# Patient Record
Sex: MALE | Race: WHITE | URBAN - METROPOLITAN AREA
[De-identification: names, ages, dates, MRNs, and addresses within clinical notes are randomized per-mention and may not be internally consistent; named-entity substitution may affect disease eponyms.]

---

## 2017-03-31 RX ORDER — BISOPROLOL FUMARATE AND HYDROCHLOROTHIAZIDE 5; 6.25 MG/1; MG/1
TABLET ORAL
Qty: 30 TAB | Refills: 0 | Status: SHIPPED | OUTPATIENT
Start: 2017-03-31 | End: 2017-05-09 | Stop reason: SDUPTHER

## 2017-03-31 RX ORDER — AMLODIPINE AND BENAZEPRIL HYDROCHLORIDE 5; 10 MG/1; MG/1
CAPSULE ORAL
Qty: 30 CAP | Refills: 0 | Status: SHIPPED | OUTPATIENT
Start: 2017-03-31 | End: 2017-05-09 | Stop reason: SDUPTHER

## 2017-04-11 ENCOUNTER — TELEPHONE (OUTPATIENT)
Dept: FAMILY MEDICINE CLINIC | Age: 62
End: 2017-04-11

## 2017-04-11 NOTE — TELEPHONE ENCOUNTER
Contact # is [phone]    Patient is requesting a call from [facility], did not want to state what he was calling in regards to

## 2017-05-09 ENCOUNTER — OFFICE VISIT (OUTPATIENT)
Dept: FAMILY MEDICINE CLINIC | Age: 62
End: 2017-05-09

## 2017-05-09 VITALS
RESPIRATION RATE: 18 BRPM | OXYGEN SATURATION: 98 % | SYSTOLIC BLOOD PRESSURE: 124 MMHG | HEART RATE: 77 BPM | DIASTOLIC BLOOD PRESSURE: 72 MMHG | TEMPERATURE: 98.9 F | WEIGHT: 173.6 LBS | HEIGHT: 70 IN | BODY MASS INDEX: 24.85 KG/M2

## 2017-05-09 DIAGNOSIS — E63.9 DIETARY DEFICIENCY: ICD-10-CM

## 2017-05-09 DIAGNOSIS — R20.2 NUMBNESS AND TINGLING OF BOTH FEET: ICD-10-CM

## 2017-05-09 DIAGNOSIS — I10 ESSENTIAL HYPERTENSION, BENIGN: Primary | ICD-10-CM

## 2017-05-09 DIAGNOSIS — E78.5 HYPERLIPIDEMIA LDL GOAL <130: ICD-10-CM

## 2017-05-09 DIAGNOSIS — R20.0 NUMBNESS AND TINGLING OF BOTH FEET: ICD-10-CM

## 2017-05-09 RX ORDER — BISOPROLOL FUMARATE AND HYDROCHLOROTHIAZIDE 5; 6.25 MG/1; MG/1
TABLET ORAL
Qty: 90 TAB | Refills: 1 | Status: SHIPPED | OUTPATIENT
Start: 2017-05-09 | End: 2017-10-20 | Stop reason: SDUPTHER

## 2017-05-09 RX ORDER — AMLODIPINE AND BENAZEPRIL HYDROCHLORIDE 5; 10 MG/1; MG/1
CAPSULE ORAL
Qty: 90 CAP | Refills: 1 | Status: SHIPPED | OUTPATIENT
Start: 2017-05-09 | End: 2017-10-20 | Stop reason: SDUPTHER

## 2017-05-09 NOTE — MR AVS SNAPSHOT
Visit Information Date & Time Provider Department Dept. Phone Encounter #  
 5/9/2017  2:00 PM Sapna Eastman  [address] [place] [phone] [id number] Upcoming Health Maintenance Date Due COLONOSCOPY 9/16/1972 ZOSTER VACCINE AGE 60> 9/16/2014 INFLUENZA AGE 9 TO ADULT 8/1/2017 DTaP/Tdap/Td series (2 - Td) 5/12/2025 Allergies as of 5/9/2017  Review Complete On: 5/9/2017 By: Eliana Jarrett LPN No Known Allergies Current Immunizations  Never Reviewed Name Date Tdap 5/12/2015 Not reviewed this visit You Were Diagnosed With   
  
 Codes Comments Essential hypertension, benign    -  Primary ICD-10-CM: I10 
ICD-9-CM: 401.1 Hyperlipidemia LDL goal <130     ICD-10-CM: E78.5 ICD-9-CM: 272.4 Numbness and tingling of both feet     ICD-10-CM: R20.0, R20.2 ICD-9-CM: 782.0 Dietary deficiency     ICD-10-CM: E63.9 ICD-9-CM: 269.9 Vitals BP Pulse Temp Resp Height(growth percentile) Weight(growth percentile) 124/72 (BP 1 Location: Left arm, BP Patient Position: Sitting) 77 98.9 °F (37.2 °C) (Oral) 18 5' 10\" (1.778 m) 173 lb 9.6 oz (78.7 kg) SpO2 BMI Smoking Status 98% 24.91 kg/m2 Never Smoker BMI and BSA Data Body Mass Index Body Surface Area 24.91 kg/m 2 1.97 m 2 Preferred Pharmacy Pharmacy Name Phone [facility]/PHARMACY #[id number]ellon [facility] [address] [phone] Your Updated Medication List  
  
   
This list is accurate as of: 5/9/17  2:50 PM.  Always use your most recent med list.  
  
  
  
  
 ALPRAZolam 0.25 mg tablet Commonly known as:  Vijay Inch Take 0.5-1 Tabs by mouth two (2) times daily as needed for Anxiety. Max Daily Amount: 0.5 mg.  
  
 amLODIPine-benazepril 5-10 mg per capsule Commonly known as:  LOTREL  
TAKE 1 CAPSULE BY MOUTH DAILY. bisoprolol-hydroCHLOROthiazide 5-6.25 mg per tablet Commonly known as:  [facility] TAKE 1 TABLET BY MOUTH DAILY. NexIUM 20 mg capsule Generic drug:  esomeprazole Take  by mouth daily. zolpidem 10 mg tablet Commonly known as:  AMBIEN Take 0.5-1 Tabs by mouth nightly as needed for Sleep. Max Daily Amount: 10 mg.  
  
  
  
  
Prescriptions Sent to Pharmacy Refills  
 amLODIPine-benazepril (LOTREL) 5-10 mg per capsule 1 Sig: TAKE 1 CAPSULE BY MOUTH DAILY. Class: Normal  
 Pharmacy: [facility]/pharmacy #[id number] [place], [address]i Castellon [facility] [address] Ph #: [phone]  
 bisoprolol-hydroCHLOROthiazide (ZIAC) 5-6.25 mg per tablet 1 Sig: TAKE 1 TABLET BY MOUTH DAILY. Class: Normal  
 Pharmacy: [facility]/pharmacy #[id number]- [place], [address] [facility] Ph #: [phone] We Performed the Following LIPID PANEL [03864 CPT(R)] METABOLIC PANEL, COMPREHENSIVE [97941 CPT(R)] VITAMIN B12 [id number] CPT(R)] VITAMIN D, 25 HYDROXY [id number] CPT(R)] Patient Instructions High Blood Pressure: Care Instructions Your Care Instructions If your blood pressure is usually above 140/90, you have high blood pressure, or hypertension. That means the top number is 140 or higher or the bottom number is 90 or higher, or both. Despite what a lot of people think, high blood pressure usually doesn't cause headaches or make you feel dizzy or lightheaded. It usually has no symptoms. But it does increase your risk for heart attack, stroke, and kidney or eye damage. The higher your blood pressure, the more your risk increases. Your doctor will give you a goal for your blood pressure. Your goal will be based on your health and your age. An example of a goal is to keep your blood pressure below 140/90. Lifestyle changes, such as eating healthy and being active, are always important to help lower blood pressure. You might also take medicine to reach your blood pressure goal. 
Follow-up care is a key part of your treatment and safety.  Be sure to make and go to all appointments, and call your doctor if you are having problems. It's also a good idea to know your test results and keep a list of the medicines you take. How can you care for yourself at home? Medical treatment · If you stop taking your medicine, your blood pressure will go back up. You may take one or more types of medicine to lower your blood pressure. Be safe with medicines. Take your medicine exactly as prescribed. Call your doctor if you think you are having a problem with your medicine. · Talk to your doctor before you start taking aspirin every day. Aspirin can help certain people lower their risk of a heart attack or stroke. But taking aspirin isn't right for everyone, because it can cause serious bleeding. · See your doctor regularly. You may need to see the doctor more often at first or until your blood pressure comes down. · If you are taking blood pressure medicine, talk to your doctor before you take decongestants or anti-inflammatory medicine, such as ibuprofen. Some of these medicines can raise blood pressure. · Learn how to check your blood pressure at home. Lifestyle changes · Stay at a healthy weight. This is especially important if you put on weight around the waist. Losing even 10 pounds can help you lower your blood pressure. · If your doctor recommends it, get more exercise. Walking is a good choice. Bit by bit, increase the amount you walk every day. Try for at least 30 minutes on most days of the week. You also may want to swim, bike, or do other activities. · Avoid or limit alcohol. Talk to your doctor about whether you can drink any alcohol. · Try to limit how much sodium you eat to less than 2,300 milligrams (mg) a day. Your doctor may ask you to try to eat less than 1,500 mg a day. · Eat plenty of fruits (such as bananas and oranges), vegetables, legumes, whole grains, and low-fat dairy products. · Lower the amount of saturated fat in your diet. Saturated fat is found in animal products such as milk, cheese, and meat. Limiting these foods may help you lose weight and also lower your risk for heart disease. · Do not smoke. Smoking increases your risk for heart attack and stroke. If you need help quitting, talk to your doctor about stop-smoking programs and medicines. These can increase your chances of quitting for good. When should you call for help? Call 911 anytime you think you may need emergency care. This may mean having symptoms that suggest that your blood pressure is causing a serious heart or blood vessel problem. Your blood pressure may be over 180/110. For example, call 911 if: 
· You have symptoms of a heart attack. These may include: ¨ Chest pain or pressure, or a strange feeling in the chest. 
¨ Sweating. ¨ Shortness of breath. ¨ Nausea or vomiting. ¨ Pain, pressure, or a strange feeling in the back, neck, jaw, or upper belly or in one or both shoulders or arms. ¨ Lightheadedness or sudden weakness. ¨ A fast or irregular heartbeat. · You have symptoms of a stroke. These may include: 
¨ Sudden numbness, tingling, weakness, or loss of movement in your face, arm, or leg, especially on only one side of your body. ¨ Sudden vision changes. ¨ Sudden trouble speaking. ¨ Sudden confusion or trouble understanding simple statements. ¨ Sudden problems with walking or balance. ¨ A sudden, severe headache that is different from past headaches. · You have severe back or belly pain. Do not wait until your blood pressure comes down on its own. Get help right away. Call your doctor now or seek immediate care if: 
· Your blood pressure is much higher than normal (such as 180/110 or higher), but you don't have symptoms. · You think high blood pressure is causing symptoms, such as: ¨ Severe headache. ¨ Blurry vision.  
Watch closely for changes in your health, and be sure to contact your doctor if: 
· Your blood pressure measures 140/90 or higher at least 2 times. That means the top number is 140 or higher or the bottom number is 90 or higher, or both. · You think you may be having side effects from your blood pressure medicine. · Your blood pressure is usually normal, but it goes above normal at least 2 times. Where can you learn more? Go to http://jorge alberto-katie.info/. Enter C901 in the search box to learn more about \"High Blood Pressure: Care Instructions. \" Current as of: August 8, 2016 Content Version: 11.2 © 6582-8750 [employer]. Care instructions adapted under license by [employer] (which disclaims liability or warranty for this information). If you have questions about a medical condition or this instruction, always ask your healthcare professional. [address] any warranty or liability for your use of this information. Introducing [facility]! Dear Jailene Walker: Thank you for requesting a [employer] account. Our records indicate that you already have an active [employer] account. You can access your account anytime at [email or web address] Did you know that you can access your hospital and ER discharge instructions at any time in [employer]? You can also review all of your test results from your hospital stay or ER visit. Additional Information If you have questions, please visit the Frequently Asked Questions section of the [employer] website at [email or web address] Remember, [employer] is NOT to be used for urgent needs. For medical emergencies, dial 911. Now available from your iPhone and Android! Please provide this summary of care documentation to your next provider. Your primary care clinician is listed as [place] [address], [facility]/s . If you have any questions after today's visit, please call [phone].

## 2017-05-09 NOTE — PROGRESS NOTES
HISTORY OF PRESENT ILLNESS  HPI  Anita Villarreal is a 57 y.o. Male with history of HTN and hyperlipidemia who presents to office today for follow-up. Pt complains of corns on the base of both of his second toes. Pt notes an intermittent burning, tingling feeling in both feet. He states that this feeling was worse 3-4 months ago. Pt states that his last colonoscopy was done in the 1980's with Dr. Leanne Navarro; the colonoscopy revealed polyps. Pt notes a family history of colon cancer with his maternal grandmother, who developed it in her 46s; he notes that his mother also had polyps but not colon cancer. Pt notes that he had hemorrhoid surgery in the early 2000's. Past Medical History:   Diagnosis Date    Essential hypertension, benign 3/15/2015    Skin cancer     2 BCC's    Sun-damaged skin      Past Surgical History:   Procedure Laterality Date    HX COLONOSCOPY  ? age 47    overdue-family h/o    HX HERNIA REPAIR      HX TONSILLECTOMY       No current outpatient prescriptions on file prior to visit. No current facility-administered medications on file prior to visit. No Known Allergies  Family History   Problem Relation Age of Onset    Heart Disease Mother     MS Father     Heart Disease Father     No Known Problems Sister      Social History     Social History    Marital status: UNKNOWN     Spouse name: N/A    Number of children: N/A    Years of education: N/A     Social History Main Topics    Smoking status: Never Smoker    Smokeless tobacco: None    Alcohol use 7.0 oz/week     14 Standard drinks or equivalent per week      Comment: occasionally    Drug use: None    Sexual activity: Not Asked     Other Topics Concern    None     Social History Narrative               Review of Systems   Constitutional: Negative for chills, diaphoresis, fever, malaise/fatigue and weight loss.    HENT: Negative for congestion, ear discharge, ear pain, hearing loss, nosebleeds, sore throat and tinnitus. Eyes: Negative for blurred vision, double vision, photophobia, pain, discharge and redness. Respiratory: Negative for cough, hemoptysis, sputum production, shortness of breath, wheezing and stridor. Cardiovascular: Negative for chest pain, palpitations, orthopnea, claudication, leg swelling and PND. Gastrointestinal: Negative for abdominal pain, blood in stool, constipation, diarrhea, heartburn, melena, nausea and vomiting. Genitourinary: Negative for dysuria, flank pain, frequency, hematuria and urgency. Musculoskeletal: Negative for back pain, falls, joint pain, myalgias and neck pain. Skin: Negative for itching and rash. Calluses on left great toe & both second toes   Neurological: Positive for tingling (bilateral feet). Negative for dizziness, tremors, sensory change, speech change, focal weakness, seizures, loss of consciousness, weakness and headaches. Endo/Heme/Allergies: Negative for environmental allergies and polydipsia. Does not bruise/bleed easily. Psychiatric/Behavioral: Negative for depression, hallucinations, memory loss, substance abuse and suicidal ideas. The patient is not nervous/anxious and does not have insomnia. Results for orders placed or performed in visit on 05/12/15   CBC W/O DIFF   Result Value Ref Range    WBC 7.8 3.4 - 10.8 x10E3/uL    RBC 5.20 4. 14 - 5.80 x10E6/uL    HGB 16.1 12.6 - 17.7 g/dL    HCT 45.7 37.5 - 51.0 %    MCV 88 79 - 97 fL    MCH 31.0 26.6 - 33.0 pg    MCHC 35.2 31.5 - 35.7 g/dL    RDW 12.6 12.3 - 15.4 %    PLATELET 799 305 - 961 x10E3/uL   LIPID PANEL   Result Value Ref Range    Cholesterol, total 276 (H) 100 - 199 mg/dL    Triglyceride 203 (H) 0 - 149 mg/dL    HDL Cholesterol 49 >39 mg/dL    VLDL, calculated 41 (H) 5 - 40 mg/dL    LDL, calculated 186 (H) 0 - 99 mg/dL   PROSTATE SPECIFIC AG (PSA)   Result Value Ref Range    Prostate Specific Ag 5.1 (H) 0.0 - 4.0 ng/mL   METABOLIC PANEL, BASIC   Result Value Ref Range    Glucose 101 (H) 65 - 99 mg/dL    BUN 19 8 - 27 mg/dL    Creatinine 1.17 0.76 - 1.27 mg/dL    GFR est non-AA 67 >59 mL/min/1.73    GFR est AA 78 >59 mL/min/1.73    BUN/Creatinine ratio 16 10 - 22    Sodium 143 134 - 144 mmol/L    Potassium 4.2 3.5 - 5.2 mmol/L    Chloride 101 97 - 108 mmol/L    CO2 24 18 - 29 mmol/L    Calcium 9.7 8.6 - 10.2 mg/dL   URINALYSIS W/ RFLX MICROSCOPIC   Result Value Ref Range    Specific Gravity CANCELED     pH (UA) CANCELED     Protein CANCELED     Glucose CANCELED     Ketone CANCELED    CVD REPORT   Result Value Ref Range    INTERPRETATION Note                  Physical Exam  Visit Vitals    /72 (BP 1 Location: Left arm, BP Patient Position: Sitting)    Pulse 77    Temp 98.9 °F (37.2 °C) (Oral)    Resp 18    Ht 5' 10\" (1.778 m)    Wt 173 lb 9.6 oz (78.7 kg)    SpO2 98%    BMI 24.91 kg/m2        Visit Vitals    /72 (BP 1 Location: Left arm, BP Patient Position: Sitting)    Pulse 77    Temp 98.9 °F (37.2 °C) (Oral)    Resp 18    Ht 5' 10\" (1.778 m)    Wt 173 lb 9.6 oz (78.7 kg)    SpO2 98%    BMI 24.91 kg/m2     General:  Alert, cooperative, no distress, appears stated age. Head:  Normocephalic, without obvious abnormality, atraumatic. Eyes:  Conjunctivae/corneas clear. PERRL, EOMs intact. Fundi benign   Ears:  Normal TMs and external ear canals both ears. Nose: Nares normal. Septum midline. Mucosa normal. No drainage or sinus tenderness. Throat: Lips, mucosa, and tongue normal. Teeth and gums normal.   Neck: Supple, symmetrical, trachea midline, no adenopathy, thyroid: no enlargement/tenderness/nodules, no carotid bruit and no JVD. Back:   Symmetric, no curvature. ROM normal. No CVA tenderness. Lungs:   Clear to auscultation bilaterally. Chest wall:  No tenderness or deformity. Heart:  Regular rate and rhythm, S1, S2 normal, no murmur, click, rub or gallop. Abdomen:   Soft, non-tender. Bowel sounds normal. No masses,  No organomegaly.    Genitalia: Normal male without lesion, discharge or tenderness. Rectal:  Normal tone, normal prostate, no masses or tenderness  Guaiac negative stool. Extremities: Extremities normal, atraumatic, no cyanosis or edema. he has a callus over the medial aspect of his left great toe, he also has a small callus at the base of both second metatarsals distally, little more on left than right, and it's a little bit tender in that area   Pulses: 2+ and symmetric all extremities. Skin: Skin color, texture, turgor normal. No rashes or lesions   Lymph nodes: Cervical, supraclavicular, and axillary nodes normal.   Neurologic: CNII-XII intact. Normal strength, sensation and reflexes throughout. ASSESSMENT and PLAN    ICD-10-CM ICD-9-CM    1. Essential hypertension, benign I10 401.1 LIPID PANEL      METABOLIC PANEL, COMPREHENSIVE      amLODIPine-benazepril (LOTREL) 5-10 mg per capsule      bisoprolol-hydroCHLOROthiazide (ZIAC) 5-6.25 mg per tablet   2. Hyperlipidemia LDL goal <130 E78.5 272.4    3. Numbness and tingling of both feet R20.0 782.0 VITAMIN B12    R20.2     4. Dietary deficiency E63.9 269.9 VITAMIN D, 25 HYDROXY     Rebbecca Cjter was seen today for hypertension and foot pain. Diagnoses and all orders for this visit:    Essential hypertension, benign  -     LIPID PANEL  -     METABOLIC PANEL, COMPREHENSIVE  -     amLODIPine-benazepril (LOTREL) 5-10 mg per capsule; TAKE 1 CAPSULE BY MOUTH DAILY. -     bisoprolol-hydroCHLOROthiazide (ZIAC) 5-6.25 mg per tablet; TAKE 1 TABLET BY MOUTH DAILY. Hyperlipidemia LDL goal <130    Numbness and tingling of both feet  -     VITAMIN B12    Dietary deficiency  -     VITAMIN D, 25 HYDROXY      Follow-up Disposition:  Return if foot pain, tingling symptoms worsen or fail to improve, for F/U HTN and CHOL.      lab results and schedule of future lab studies reviewed with patient  reviewed diet, exercise and weight control  cardiovascular risk and specific lipid/LDL goals reviewed  reviewed medications and side effects in detail  Please call my office if there are any questions- [phone]. I will arrange for follow up after the lab tests done from today return  Recommended a weekly \"heart check. \" I went into detail how to do this. Call for refills on medications as needed. Discussed expected course/resolution/complications of diagnosis in detail with patient. Medication risks/benefits/costs/interactions/alternatives discussed with patient. Pt was given an after visit summary which includes diagnoses, current medications & vitals. Pt expressed understanding with the diagnosis and plan. Total 25 minutes,60 % counseling re: Reviewed in detail the proper technique of checking the blood pressure- check it on an average day only, not on a stressful day, sitting, no exercise for at least 1 hour and not experiencing any new pain( chronic pain is OK). Patient encouraged to check BP sitting and standing at least once a month and to report these readings to me if > 140/ 90 on average , or if the standing BP is >  15 points lower than the sitting. Reviewed symptoms, or lack thereof, of hypertension and elevated cholesterol. Because of the tingling in his feet, we checked his B12 level. We'll check his vitamin D level as well, as he believes it was low in the past and he does not consume milk products on a regular basis. iscussion re: causes of tingling, what it can be and what it is not( not likely to be DM, e.g. Due to normal glucose in the recent past, etc.    As far his colonoscopy, he should have that done every 5 years, and I gave him the name of Dr. Kavin Dasilva. He'll let me know if he can't get in over the next couple months. A heme test stool x3 was given; if that's negative, he can wait for the colonoscopy.   Also, discussed symptoms of concern that were noted today in the note above, treatment options( including doing nothing), when to follow up before recommended time frame. Also, answered all questions. This document was written by Jhon Valles, as dictated by Louis Lai MD.  I have reviewed and agree with the above note and have made corrections where appropriate Max Quintana M.D.

## 2017-05-09 NOTE — LETTER
7/5/2017 9:20 AM 
 
Mr. Karen Moran [facility] [address] [phone] Dear Karen Ricardo: 
 
Please find your most recent results below. Resulted Orders LIPID PANEL Result Value Ref Range Cholesterol, total 255 (H) 100 - 199 mg/dL Triglyceride 138 0 - 149 mg/dL HDL Cholesterol 59 >39 mg/dL VLDL, calculated 28 5 - 40 mg/dL LDL, calculated 168 (H) 0 - 99 mg/dL Narrative Performed at:  [address]  [id number] : Bryanna Aguirre MD, Phone:  [phone] METABOLIC PANEL, COMPREHENSIVE Result Value Ref Range Glucose 99 65 - 99 mg/dL BUN 16 8 - 27 mg/dL Creatinine 1.08 0.76 - 1.27 mg/dL GFR est non-AA 73 >59 mL/min/1.73 GFR est AA 85 >59 mL/min/1.73  
 BUN/Creatinine ratio 15 10 - 24 Sodium 142 134 - 144 mmol/L Potassium 4.2 3.5 - 5.2 mmol/L Chloride 98 96 - 106 mmol/L  
 CO2 24 18 - 29 mmol/L Calcium 9.5 8.6 - 10.2 mg/dL Protein, total 6.8 6.0 - 8.5 g/dL Albumin 4.5 3.6 - 4.8 g/dL GLOBULIN, TOTAL 2.3 1.5 - 4.5 g/dL A-G Ratio 2.0 1.2 - 2.2 Bilirubin, total 0.6 0.0 - 1.2 mg/dL Alk. phosphatase 102 39 - 117 IU/L  
 AST (SGOT) 44 (H) 0 - 40 IU/L  
 ALT (SGPT) 41 0 - 44 IU/L Narrative Performed at:  [address]  [id number] : Bryanna Aguirre MD, Phone:  [phone] VITAMIN D, 25 HYDROXY Result Value Ref Range VITAMIN D, 25-HYDROXY 12.7 (L) 30.0 - 100.0 ng/mL Comment:  
   Vitamin D deficiency has been defined by the [facility] practice guideline as a 
level of serum 25-OH vitamin D less than 20 ng/mL (1,2). The Endocrine Society went on to further define vitamin D 
insufficiency as a level between 21 and 29 ng/mL (2). 1. IOM ([place] of Medicine). 2010. Dietary reference 
   intakes for calcium and D. [facility]: The 
   auctionPAL. 2. Pipe MF, Priyanka SNYDER, Vicky QUINTEROS, et al. 
   Evaluation, treatment, and prevention of vitamin D 
   deficiency: an Endocrine Society clinical practice 
   guideline. JCEM. 2011 Jul; 96(7):1911-30. Narrative Performed at:  [address]  [id number] : April Avery MD, Phone:  [phone] VITAMIN B12 Result Value Ref Range Vitamin B12 334 211 - 946 pg/mL Narrative Performed at:  [address]  [id number] : April Avery MD, Phone:  [phone] CVD REPORT Result Value Ref Range INTERPRETATION Note Comment:  
   Supplement report is available. Narrative Performed at:  [address] [place] [address] [address] [place] [address]  [id number] : Kavon Baker PhD, Phone:  [phone] RECOMMENDATIONS: 
Your LDL cholesterol continues to be elevated. The American Heart Association Heart Risk Calculation( a new way to calculate your risk of a stroke or heart attack in the future) shows your risk to be 12.2 % chance of a heart attack or stroke in the next 10 years( mainly due to your age); a statin ( a cholesterol lowering medication )  is recommended if that # is > 7.5 %. Also, Your Vit D was low- You need to take lifelong Vitamin D( available OTC)- 5000 units daily preferably with a meal to help absorption. Everything else is OK.  No diabetes, normal liver and kidney tests.    
 
 Please make an appointment to discuss starting a medication to reduce your cardiovascular risk Please call me if you have any questions: [phone] Sincerely, 
 
Harjeet Restrepo MD

## 2017-05-09 NOTE — PATIENT INSTRUCTIONS

## 2017-05-09 NOTE — PROGRESS NOTES
Chief Complaint   Patient presents with    Hypertension     Patient is having some bilateral foot irritation. Patient has tingling, and burning sensation in hands and feet. 1. Have you been to the ER, urgent care clinic since your last visit? Hospitalized since your last visit? No    2. Have you seen or consulted any other health care providers outside of the [address] [place] [address] since your last visit? Include any pap smears or colon screening.  No

## 2017-05-10 LAB
25(OH)D3+25(OH)D2 SERPL-MCNC: 12.7 NG/ML (ref 30–100)
ALBUMIN SERPL-MCNC: 4.5 G/DL (ref 3.6–4.8)
ALBUMIN/GLOB SERPL: 2 {RATIO} (ref 1.2–2.2)
ALP SERPL-CCNC: 102 IU/L (ref 39–117)
ALT SERPL-CCNC: 41 IU/L (ref 0–44)
AST SERPL-CCNC: 44 IU/L (ref 0–40)
BILIRUB SERPL-MCNC: 0.6 MG/DL (ref 0–1.2)
BUN SERPL-MCNC: 16 MG/DL (ref 8–27)
BUN/CREAT SERPL: 15 (ref 10–24)
CALCIUM SERPL-MCNC: 9.5 MG/DL (ref 8.6–10.2)
CHLORIDE SERPL-SCNC: 98 MMOL/L (ref 96–106)
CHOLEST SERPL-MCNC: 255 MG/DL (ref 100–199)
CO2 SERPL-SCNC: 24 MMOL/L (ref 18–29)
CREAT SERPL-MCNC: 1.08 MG/DL (ref 0.76–1.27)
GLOBULIN SER CALC-MCNC: 2.3 G/DL (ref 1.5–4.5)
GLUCOSE SERPL-MCNC: 99 MG/DL (ref 65–99)
HDLC SERPL-MCNC: 59 MG/DL
INTERPRETATION, 910389: NORMAL
LDLC SERPL CALC-MCNC: 168 MG/DL (ref 0–99)
POTASSIUM SERPL-SCNC: 4.2 MMOL/L (ref 3.5–5.2)
PROT SERPL-MCNC: 6.8 G/DL (ref 6–8.5)
SODIUM SERPL-SCNC: 142 MMOL/L (ref 134–144)
TRIGL SERPL-MCNC: 138 MG/DL (ref 0–149)
VIT B12 SERPL-MCNC: 334 PG/ML (ref 211–946)
VLDLC SERPL CALC-MCNC: 28 MG/DL (ref 5–40)

## 2017-07-04 NOTE — PROGRESS NOTES
Your LDL cholesterol continues to be elevated. The American Heart Association Heart Risk Calculation( a new way to calculate your risk of a stroke or heart attack in the future) shows your risk to be 12.2 % chance of a heart attack or stroke in the next 10 years( mainly due to your age); a statin ( a cholesterol lowering medication )  is recommended if that # is > 7.5 %. Also, Your Vit D was low- You need to take lifelong Vitamin D( available OTC)- 5000 units daily preferably with a meal to help absorption. Everything else is OK. No diabetes, normal liver and kidney tests.        Please make an appointment to discuss starting a medication to reduce your cardiovascular risk

## 2018-01-22 DIAGNOSIS — I10 ESSENTIAL HYPERTENSION, BENIGN: ICD-10-CM

## 2018-01-22 RX ORDER — AMLODIPINE AND BENAZEPRIL HYDROCHLORIDE 5; 10 MG/1; MG/1
CAPSULE ORAL
Qty: 90 CAP | Refills: 0 | Status: SHIPPED | OUTPATIENT
Start: 2018-01-22 | End: 2018-04-12 | Stop reason: SDUPTHER

## 2018-01-22 RX ORDER — BISOPROLOL FUMARATE AND HYDROCHLOROTHIAZIDE 5; 6.25 MG/1; MG/1
TABLET ORAL
Qty: 90 TAB | Refills: 0 | Status: SHIPPED | OUTPATIENT
Start: 2018-01-22 | End: 2018-04-12 | Stop reason: SDUPTHER

## 2018-02-07 ENCOUNTER — OFFICE VISIT (OUTPATIENT)
Dept: FAMILY MEDICINE CLINIC | Age: 63
End: 2018-02-07

## 2018-02-07 VITALS
HEIGHT: 70 IN | HEART RATE: 72 BPM | DIASTOLIC BLOOD PRESSURE: 68 MMHG | OXYGEN SATURATION: 98 % | WEIGHT: 179.8 LBS | SYSTOLIC BLOOD PRESSURE: 128 MMHG | TEMPERATURE: 98.5 F | RESPIRATION RATE: 16 BRPM | BODY MASS INDEX: 25.74 KG/M2

## 2018-02-07 DIAGNOSIS — L57.8 ACTINIC SKIN DAMAGE: ICD-10-CM

## 2018-02-07 DIAGNOSIS — Z12.11 SCREENING FOR COLON CANCER: ICD-10-CM

## 2018-02-07 DIAGNOSIS — I10 ESSENTIAL HYPERTENSION, BENIGN: ICD-10-CM

## 2018-02-07 DIAGNOSIS — E55.9 VITAMIN D DEFICIENCY: Chronic | ICD-10-CM

## 2018-02-07 DIAGNOSIS — Z00.00 ROUTINE GENERAL MEDICAL EXAMINATION AT A HEALTH CARE FACILITY: Primary | ICD-10-CM

## 2018-02-07 DIAGNOSIS — E78.5 HYPERLIPIDEMIA LDL GOAL <130: ICD-10-CM

## 2018-02-07 NOTE — PROGRESS NOTES
HISTORY OF PRESENT ILLNESS  HPI  Tyrese Rodgers is a 60 y.o. male with history of HTN and hyperlipidemia who presents to office today for a complete fasting physical. He denies unusual SOB, chest pain, and any recent ER visits or hospitalizations. Pt states that his last colonoscopy was 10+ years ago, which found benign polyps. He notes a family history of colon cancer with his grandmother. He denies visible blood in stool. Pt has seen dermatologist Dr. Kirti Torres for removal of several pre-cancerous lesions. His last visit was around one year ago, and he was told to follow up in one year. Pt complains of intermittent bilateral ankle swelling, equal in both legs. He states the swelling goes down overnight. Past Medical History:   Diagnosis Date    Essential hypertension, benign 3/15/2015    Skin cancer     2 BCC's    Sun-damaged skin     Vitamin D deficiency 2/11/2018     Past Surgical History:   Procedure Laterality Date    HX COLONOSCOPY  ? age 47    overdue-family h/o    HX HERNIA REPAIR      HX TONSILLECTOMY       Current Outpatient Prescriptions on File Prior to Visit   Medication Sig Dispense Refill    bisoprolol-hydroCHLOROthiazide (ZIAC) 5-6.25 mg per tablet TAKE 1 TABLET BY MOUTH DAILY. 90 Tab 0    amLODIPine-benazepril (LOTREL) 5-10 mg per capsule TAKE 1 CAPSULE BY MOUTH DAILY. 90 Cap 0     No current facility-administered medications on file prior to visit.       No Known Allergies  Family History   Problem Relation Age of Onset    Heart Disease Mother     MS Father     Heart Disease Father     No Known Problems Sister      Social History     Social History    Marital status: UNKNOWN     Spouse name: N/A    Number of children: N/A    Years of education: N/A     Social History Main Topics    Smoking status: Never Smoker    Smokeless tobacco: Never Used    Alcohol use 7.0 oz/week     14 Standard drinks or equivalent per week      Comment: occasionally    Drug use: None    Sexual activity: Not Asked     Other Topics Concern    None     Social History Narrative             Review of Systems   Constitutional: Negative for chills, diaphoresis, fever, malaise/fatigue and weight loss. HENT: Negative for congestion, ear discharge, ear pain, hearing loss, nosebleeds, sore throat and tinnitus. Eyes: Negative for blurred vision, double vision, photophobia, pain, discharge and redness. Respiratory: Negative for cough, hemoptysis, sputum production, shortness of breath, wheezing and stridor. Cardiovascular: Negative for chest pain, palpitations, orthopnea, claudication, leg swelling and PND. Gastrointestinal: Negative for abdominal pain, blood in stool, constipation, diarrhea, heartburn, melena, nausea and vomiting. Genitourinary: Negative for dysuria, flank pain, frequency, hematuria and urgency. Musculoskeletal: Negative for back pain, falls, joint pain, myalgias and neck pain. Skin: Negative for itching and rash. Neurological: Negative for dizziness, tingling, tremors, sensory change, speech change, focal weakness, seizures, loss of consciousness, weakness and headaches. Endo/Heme/Allergies: Negative for environmental allergies and polydipsia. Does not bruise/bleed easily. Psychiatric/Behavioral: Negative for depression, hallucinations, memory loss, substance abuse and suicidal ideas. The patient is not nervous/anxious and does not have insomnia.       Results for orders placed or performed in visit on 89/97/32   METABOLIC PANEL, COMPREHENSIVE   Result Value Ref Range    Glucose 123 (H) 65 - 99 mg/dL    BUN 15 8 - 27 mg/dL    Creatinine 1.14 0.76 - 1.27 mg/dL    GFR est non-AA 68 >59 mL/min/1.73    GFR est AA 79 >59 mL/min/1.73    BUN/Creatinine ratio 13 10 - 24    Sodium 140 134 - 144 mmol/L    Potassium 4.6 3.5 - 5.2 mmol/L    Chloride 97 96 - 106 mmol/L    CO2 26 18 - 29 mmol/L    Calcium 9.5 8.6 - 10.2 mg/dL    Protein, total 7.0 6.0 - 8.5 g/dL    Albumin 4.6 3.6 - 4.8 g/dL    GLOBULIN, TOTAL 2.4 1.5 - 4.5 g/dL    A-G Ratio 1.9 1.2 - 2.2    Bilirubin, total 0.9 0.0 - 1.2 mg/dL    Alk. phosphatase 105 39 - 117 IU/L    AST (SGOT) 53 (H) 0 - 40 IU/L    ALT (SGPT) 48 (H) 0 - 44 IU/L   LIPID PANEL   Result Value Ref Range    Cholesterol, total 272 (H) 100 - 199 mg/dL    Triglyceride 229 (H) 0 - 149 mg/dL    HDL Cholesterol 59 >39 mg/dL    VLDL, calculated 46 (H) 5 - 40 mg/dL    LDL, calculated 167 (H) 0 - 99 mg/dL   CBC W/O DIFF   Result Value Ref Range    WBC 8.8 3.4 - 10.8 x10E3/uL    RBC 5.09 4.14 - 5.80 x10E6/uL    HGB 15.6 13.0 - 17.7 g/dL    HCT 45.1 37.5 - 51.0 %    MCV 89 79 - 97 fL    MCH 30.6 26.6 - 33.0 pg    MCHC 34.6 31.5 - 35.7 g/dL    RDW 12.8 12.3 - 15.4 %    PLATELET 937 374 - 393 x10E3/uL   PSA W/ REFLX FREE PSA   Result Value Ref Range    Prostate Specific Ag 5.7 (H) 0.0 - 4.0 ng/mL    Reflex Criteria Comment    PSA, FREE   Result Value Ref Range    PSA, Free 1.08 N/A ng/mL    % Free PSA 18.9 %   CVD REPORT   Result Value Ref Range    INTERPRETATION Note                Physical Exam  Visit Vitals    /68 (BP 1 Location: Left arm, BP Patient Position: Sitting)    Pulse 72    Temp 98.5 °F (36.9 °C) (Oral)    Resp 16    Ht 5' 10\" (1.778 m)    Wt 179 lb 12.8 oz (81.6 kg)    SpO2 98%    BMI 25.8 kg/m2     General:  Alert, cooperative, no distress, appears stated age. Head:  Normocephalic, without obvious abnormality, atraumatic. Eyes:  Conjunctivae/corneas clear. PERRL, EOMs intact. Fundi benign   Ears:  Normal TMs and external ear canals both ears. Nose: Nares normal. Septum midline. Mucosa normal. No drainage or sinus tenderness. Throat: Lips, mucosa, and tongue normal. Teeth and gums normal.   Neck: Supple, symmetrical, trachea midline, no adenopathy, thyroid: no enlargement/tenderness/nodules, no carotid bruit and no JVD. Back:   Symmetric, no curvature. ROM normal. No CVA tenderness. Lungs:   Clear to auscultation bilaterally.    Chest wall: No tenderness or deformity. Heart:  Regular rate and rhythm, S1, S2 normal, no murmur, click, rub or gallop. Abdomen:   Soft, non-tender. Bowel sounds normal. No masses,  No organomegaly. Genitalia:  Normal male without lesion, discharge or tenderness. Rectal:  Normal tone, prostate 1+ symmetrically enlarged, non-tender   Extremities: Extremities normal, atraumatic, no cyanosis or edema. Pulses: 2+ and symmetric all extremities. Skin: Skin color, texture, turgor normal. He has a lot of actinic skin changes on his face and shoulders, no signs of any active skin cancer, in general the skin has multiple benign angioma and benign moles, also has scattered seborrheic keratoses   Lymph nodes: Cervical, supraclavicular, and axillary nodes normal.   Neurologic: CNII-XII intact. Normal strength, sensation and reflexes throughout. ASSESSMENT and PLAN    ICD-10-CM ICD-9-CM    1. Routine general medical examination at a health care facility Z00.00 V70.0 AMB POC EKG ROUTINE W/ 12 LEADS, INTER & REP      METABOLIC PANEL, COMPREHENSIVE      LIPID PANEL      CBC W/O DIFF      PSA W/ REFLX FREE PSA      REFERRAL TO GASTROENTEROLOGY      PSA, FREE   2. Hyperlipidemia LDL goal <130 E78.5 272.4    3. Essential hypertension, benign I10 401.1    4. Screening for colon cancer Z12.11 V76.51 REFERRAL TO GASTROENTEROLOGY   5. Vitamin D deficiency E55.9 268.9    6. Actinic skin damage L57.8 692.79     basal cell right cheek area- sees derm regularly. Diagnoses and all orders for this visit:    1. Routine general medical examination at a health care facility  -     AMB POC EKG ROUTINE W/ 12 LEADS, INTER & REP  -     METABOLIC PANEL, COMPREHENSIVE  -     LIPID PANEL  -     CBC W/O DIFF  -     PSA W/ REFLX FREE PSA  -     [facility]  -     PSA, FREE    2. Hyperlipidemia LDL goal <130    3. Essential hypertension, benign    4. Screening for colon cancer  -     [facility]    5.  Vitamin D deficiency    6. Actinic skin damage  Comments:  basal cell right cheek area- sees derm regularly. Other orders  -     CVD REPORT      Follow-up Disposition:  Return in about 6 months (around 8/7/2018), or BP OOc, for F/U HTN and CHOL, f/u Vitamin D deficiency. lab results and schedule of future lab studies reviewed with patient  reviewed diet, exercise and weight control  cardiovascular risk and specific lipid/LDL goals reviewed  reviewed medications and side effects in detail  Please call my office if there are any questions- [phone]. I will arrange for follow up after the lab tests done from today return  Recommended a weekly \"heart check. \" I went into detail how to do this. Call for refills on medications as needed. Discussed expected course/resolution/complications of diagnosis in detail with patient. Medication risks/benefits/costs/interactions/alternatives discussed with patient. Pt was given an after visit summary which includes diagnoses, current medications & vitals. Pt expressed understanding with the diagnosis and plan. For his swelling that he has intermittently in his ankles, I recommended following a low salt diet and using support hose. Reviewed in detail the proper technique of checking the blood pressure- check it on an average day only, not on a stressful day, sitting, no exercise for at least 1 hour and not experiencing any new pain( chronic pain is OK). Patient encouraged to check BP sitting and standing at least once a month and to report these readings to me if > 140/ 90 on average , or if the standing BP is >  15 points lower than the sitting. Reviewed symptoms, or lack thereof, of hypertension and elevated cholesterol. His Vit D was low last year, but he is not on Vit D; most likely because it was< 15 , he needs to be on 5000 units of Vit D unless we tell him otherwise.    We sent him a letter last year about the Vit D and the elevated cholesterol and elevated cardiac risk numbers but he never responded or followed up on that- he really needs to start on a statin as well; will address this when we get back to him about his labs from today. For his colonoscopy I gave him the information for several GI specialists, and he should call and get that scheduled. I gave him heme test stool x3, and if that's positive it will help him get into the colonoscopy earlier. Apparently he has a cataract on his right eye of significance, and they're probably going to do surgery in the next year or so. It mainly causes nighttime vision problems when he's driving a car. This document was written by Mirian Davila, as dictated by Ryder Panchal MD.  I have reviewed and agree with the above note and have made corrections where appropriate Max Palacios M.D.

## 2018-02-07 NOTE — MR AVS SNAPSHOT
[address] 
[phone] Patient: Fátima Dominguez MRN: [id number] NRM:[birth year] Visit Information Date & Time Provider Department Dept. Phone Encounter #  
 2/7/2018  9:00 AM Gabrielle Agosto  [facility] [phone] [id number] Upcoming Health Maintenance Date Due COLONOSCOPY 9/16/1972 ZOSTER VACCINE AGE 60> 7/16/2014 DTaP/Tdap/Td series (2 - Td) 5/12/2025 Allergies as of 2/7/2018  Review Complete On: 2/7/2018 By: Brooklynn Love LPN No Known Allergies Current Immunizations  Never Reviewed Name Date Tdap 5/12/2015 Not reviewed this visit You Were Diagnosed With   
  
 Codes Comments Routine general medical examination at a health care facility    -  Primary ICD-10-CM: Z00.00 ICD-9-CM: V70.0 Hyperlipidemia LDL goal <130     ICD-10-CM: E78.5 ICD-9-CM: 272.4 Essential hypertension, benign     ICD-10-CM: I10 
ICD-9-CM: 401.1 Vitals BP Pulse Temp Resp Height(growth percentile) Weight(growth percentile) 128/68 (BP 1 Location: Left arm, BP Patient Position: Sitting) 72 98.5 °F (36.9 °C) (Oral) 16 5' 10\" (1.778 m) 179 lb 12.8 oz (81.6 kg) SpO2 BMI Smoking Status 98% 25.8 kg/m2 Never Smoker Vitals History BMI and BSA Data Body Mass Index Body Surface Area  
 25.8 kg/m 2 2.01 m 2 Preferred Pharmacy Pharmacy Name Phone [id number] [phone] Your Updated Medication List  
  
   
This list is accurate as of: 2/7/18 10:47 AM.  Always use your most recent med list. amLODIPine-benazepril 5-10 mg per capsule Commonly known as:  LOTREL  
TAKE 1 CAPSULE BY MOUTH DAILY. bisoprolol-hydroCHLOROthiazide 5-6.25 mg per tablet Commonly known as:  [facility] TAKE 1 TABLET BY MOUTH DAILY. We Performed the Following AMB POC EKG ROUTINE W/ 12 LEADS, INTER & REP [39639 CPT(R)] CBC W/O DIFF [63583 CPT(R)] LIPID PANEL [88933 CPT(R)] METABOLIC PANEL, COMPREHENSIVE [62033 CPT(R)] PSA W/ REFLX FREE PSA [64894 CPT(R)] Introducing [facility]! Dear Lucia Mendoza: Thank you for requesting a [employer] account. Our records indicate that you already have an active [employer] account. You can access your account anytime at [email or web address] Did you know that you can access your hospital and ER discharge instructions at any time in [employer]? You can also review all of your test results from your hospital stay or ER visit. Additional Information If you have questions, please visit the Frequently Asked Questions section of the [employer] website at [email or web address] Remember, [employer] is NOT to be used for urgent needs. For medical emergencies, dial 911. Now available from your iPhone and Android! Please provide this summary of care documentation to your next provider. Your primary care clinician is listed as [address] [address], [facility]/s . If you have any questions after today's visit, please call [phone].

## 2018-02-07 NOTE — PROGRESS NOTES
Pt presents to office today for complete physical and Fasting labs. Pt states that he has been seeing Dr. Stefania Landeros for skin issues. Last seen about 6 months ago. pt also reports that he has been having some neck and shoulder pain lately and will to discuss that with you. Chief Complaint   Patient presents with    Complete Physical     Yearly physical.    Labs     fasting     1. Have you been to the ER, urgent care clinic since your last visit? Hospitalized since your last visit? No    2. Have you seen or consulted any other health care providers outside of the [address] [place] [address] since your last visit? Include any pap smears or colon screening.  No

## 2018-02-08 LAB
ALBUMIN SERPL-MCNC: 4.6 G/DL (ref 3.6–4.8)
ALBUMIN/GLOB SERPL: 1.9 {RATIO} (ref 1.2–2.2)
ALP SERPL-CCNC: 105 IU/L (ref 39–117)
ALT SERPL-CCNC: 48 IU/L (ref 0–44)
AST SERPL-CCNC: 53 IU/L (ref 0–40)
BILIRUB SERPL-MCNC: 0.9 MG/DL (ref 0–1.2)
BUN SERPL-MCNC: 15 MG/DL (ref 8–27)
BUN/CREAT SERPL: 13 (ref 10–24)
CALCIUM SERPL-MCNC: 9.5 MG/DL (ref 8.6–10.2)
CHLORIDE SERPL-SCNC: 97 MMOL/L (ref 96–106)
CHOLEST SERPL-MCNC: 272 MG/DL (ref 100–199)
CO2 SERPL-SCNC: 26 MMOL/L (ref 18–29)
CREAT SERPL-MCNC: 1.14 MG/DL (ref 0.76–1.27)
ERYTHROCYTE [DISTWIDTH] IN BLOOD BY AUTOMATED COUNT: 12.8 % (ref 12.3–15.4)
GFR SERPLBLD CREATININE-BSD FMLA CKD-EPI: 68 ML/MIN/1.73
GFR SERPLBLD CREATININE-BSD FMLA CKD-EPI: 79 ML/MIN/1.73
GLOBULIN SER CALC-MCNC: 2.4 G/DL (ref 1.5–4.5)
GLUCOSE SERPL-MCNC: 123 MG/DL (ref 65–99)
HCT VFR BLD AUTO: 45.1 % (ref 37.5–51)
HDLC SERPL-MCNC: 59 MG/DL
HGB BLD-MCNC: 15.6 G/DL (ref 13–17.7)
INTERPRETATION, 910389: NORMAL
LDLC SERPL CALC-MCNC: 167 MG/DL (ref 0–99)
MCH RBC QN AUTO: 30.6 PG (ref 26.6–33)
MCHC RBC AUTO-ENTMCNC: 34.6 G/DL (ref 31.5–35.7)
MCV RBC AUTO: 89 FL (ref 79–97)
PLATELET # BLD AUTO: 220 X10E3/UL (ref 150–379)
POTASSIUM SERPL-SCNC: 4.6 MMOL/L (ref 3.5–5.2)
PROT SERPL-MCNC: 7 G/DL (ref 6–8.5)
PSA FREE MFR SERPL: 18.9 %
PSA FREE SERPL-MCNC: 1.08 NG/ML
PSA SERPL-MCNC: 5.7 NG/ML (ref 0–4)
RBC # BLD AUTO: 5.09 X10E6/UL (ref 4.14–5.8)
REFLEX CRITERIA: ABNORMAL
SODIUM SERPL-SCNC: 140 MMOL/L (ref 134–144)
TRIGL SERPL-MCNC: 229 MG/DL (ref 0–149)
VLDLC SERPL CALC-MCNC: 46 MG/DL (ref 5–40)
WBC # BLD AUTO: 8.8 X10E3/UL (ref 3.4–10.8)

## 2018-02-11 PROBLEM — E55.9 VITAMIN D DEFICIENCY: Chronic | Status: ACTIVE | Noted: 2018-02-11

## 2018-04-12 DIAGNOSIS — I10 ESSENTIAL HYPERTENSION, BENIGN: ICD-10-CM

## 2018-04-13 RX ORDER — BISOPROLOL FUMARATE AND HYDROCHLOROTHIAZIDE 5; 6.25 MG/1; MG/1
TABLET ORAL
Qty: 90 TAB | Refills: 1 | Status: SHIPPED | OUTPATIENT
Start: 2018-04-13 | End: 2018-10-16 | Stop reason: SDUPTHER

## 2018-04-13 RX ORDER — AMLODIPINE AND BENAZEPRIL HYDROCHLORIDE 5; 10 MG/1; MG/1
CAPSULE ORAL
Qty: 90 CAP | Refills: 1 | Status: SHIPPED | OUTPATIENT
Start: 2018-04-13 | End: 2018-04-17 | Stop reason: SDUPTHER

## 2018-04-17 DIAGNOSIS — I10 ESSENTIAL HYPERTENSION, BENIGN: ICD-10-CM

## 2018-04-19 RX ORDER — AMLODIPINE AND BENAZEPRIL HYDROCHLORIDE 5; 10 MG/1; MG/1
CAPSULE ORAL
Qty: 90 CAP | Refills: 1 | Status: SHIPPED | OUTPATIENT
Start: 2018-04-19 | End: 2018-10-16 | Stop reason: SDUPTHER

## 2018-08-28 ENCOUNTER — OFFICE VISIT (OUTPATIENT)
Dept: FAMILY MEDICINE CLINIC | Age: 63
End: 2018-08-28

## 2018-08-28 VITALS
HEART RATE: 76 BPM | OXYGEN SATURATION: 97 % | SYSTOLIC BLOOD PRESSURE: 130 MMHG | WEIGHT: 177 LBS | RESPIRATION RATE: 18 BRPM | DIASTOLIC BLOOD PRESSURE: 76 MMHG | BODY MASS INDEX: 25.34 KG/M2 | HEIGHT: 70 IN | TEMPERATURE: 98.1 F

## 2018-08-28 DIAGNOSIS — M21.621 BUNIONETTE OF RIGHT FOOT: ICD-10-CM

## 2018-08-28 DIAGNOSIS — M79.671 PAIN IN BOTH FEET: ICD-10-CM

## 2018-08-28 DIAGNOSIS — I10 ESSENTIAL HYPERTENSION, BENIGN: ICD-10-CM

## 2018-08-28 DIAGNOSIS — E55.9 VITAMIN D DEFICIENCY: Chronic | ICD-10-CM

## 2018-08-28 DIAGNOSIS — E78.5 HYPERLIPIDEMIA LDL GOAL <130: ICD-10-CM

## 2018-08-28 DIAGNOSIS — M79.672 PAIN IN BOTH FEET: ICD-10-CM

## 2018-08-28 DIAGNOSIS — R97.20 ELEVATED PSA MEASUREMENT: ICD-10-CM

## 2018-08-28 DIAGNOSIS — L03.032 CELLULITIS OF GREAT TOE OF LEFT FOOT: Primary | ICD-10-CM

## 2018-08-28 DIAGNOSIS — Z85.828 HISTORY OF BASAL CELL CARCINOMA: ICD-10-CM

## 2018-08-28 RX ORDER — IBUPROFEN 200 MG
600-800 TABLET ORAL
COMMUNITY
End: 2018-09-29 | Stop reason: ALTCHOICE

## 2018-08-28 RX ORDER — CEPHALEXIN 500 MG/1
500 CAPSULE ORAL 4 TIMES DAILY
Qty: 40 CAP | Refills: 0 | Status: SHIPPED | OUTPATIENT
Start: 2018-08-28 | End: 2018-09-07

## 2018-08-28 NOTE — PROGRESS NOTES
Chief Complaint Patient presents with  Foot Pain LEFT foot great toe redness and pain. RIGHT foot has dried hard area on outside of foot \"REVIEWED RECORD IN PREPARATION FOR VISIT AND HAVE OBTAINED THE NECESSARY DOCUMENTATION\" 1. Have you been to the ER, urgent care clinic since your last visit? Hospitalized since your last visit? No 
 
2. Have you seen or consulted any other health care providers outside of the [address] [place] [address] since your last visit? Include any pap smears or colon screening.  No

## 2018-08-28 NOTE — MR AVS SNAPSHOT
Marisol Pollock 
 
 
 [address] [facility] [address] [place] [address] 
[phone] Patient: Niru Grimes MRN: [id number] UFU:[birth year] Visit Information Date & Time Provider Department Dept. Phone Encounter #  
 8/28/2018  3:00 PM Jean Claude Galicia  [facility] [phone] [id number] Upcoming Health Maintenance Date Due COLONOSCOPY 9/16/1972 ZOSTER VACCINE AGE 60> 7/16/2014 Influenza Age 5 to Adult 3/31/2019* DTaP/Tdap/Td series (2 - Td) 5/12/2025 *Topic was postponed. The date shown is not the original due date. Allergies as of 8/28/2018  Review Complete On: 8/28/2018 By: Leroy Gomez LPN No Known Allergies Current Immunizations  Never Reviewed Name Date Tdap 5/12/2015 Not reviewed this visit Vitals BP Pulse Temp Resp Height(growth percentile) Weight(growth percentile) 130/76 (BP 1 Location: Left arm, BP Patient Position: Sitting) 76 98.1 °F (36.7 °C) (Oral) 18 5' 10\" (1.778 m) 177 lb (80.3 kg) SpO2 BMI Smoking Status 97% 25.4 kg/m2 Never Smoker BMI and BSA Data Body Mass Index Body Surface Area  
 25.4 kg/m 2 1.99 m 2 Preferred Pharmacy [facility] Name Phone [id number] [address] [phone] Your Updated Medication List  
  
   
This list is accurate as of 8/28/18  3:59 PM.  Always use your most recent med list. amLODIPine-benazepril 5-10 mg per capsule Commonly known as:  LOTREL  
TAKE 1 CAPSULE BY MOUTH DAILY. bisoprolol-hydroCHLOROthiazide 5-6.25 mg per tablet Commonly known as:  [facility] TAKE 1 TABLET BY MOUTH DAILY. cephALEXin 500 mg capsule Commonly known as:  [facility] Take 1 Cap by mouth four (4) times daily for 10 days. Prescriptions Sent to Pharmacy  Refills  
 cephALEXin (KEFLEX) 500 mg capsule 0  
 Sig: Take 1 Cap by mouth four (4) times daily for 10 days. Class: Normal  
 [id number] [place], [address] [facility] #: [phone] Route: Oral  
  
Introducing [facility]! Dear Krishna Standing: Thank you for requesting a [employer] account. Our records indicate that you already have an active [employer] account. You can access your account anytime at [email or web address] Did you know that you can access your hospital and ER discharge instructions at any time in [employer]? You can also review all of your test results from your hospital stay or ER visit. Additional Information If you have questions, please visit the Frequently Asked Questions section of the [employer] website at [email or web address] Remember, [employer] is NOT to be used for urgent needs. For medical emergencies, dial 911. Now available from your iPhone and Android! Please provide this summary of care documentation to your next provider. Your primary care clinician is listed as [address] [address], [facility] . If you have any questions after today's visit, please call [phone].

## 2018-08-28 NOTE — PROGRESS NOTES
HISTORY OF PRESENT ILLNESS 
HPI Yaritza Arroyo is a 60 y.o. Male with a history of HTM, itamin D deficiency and hyperlipidemia with LDL goal <130, who presents at the office today for bilateral foot discomfort. Pt noticed 2-3 weeks ago that his left great toe displays some swelling and an erythematous callous on the medial tside of the toe, and notes that the area is painful. Pt indicates a hardened area around the lateral aspect of his right foot, and notes that it tender. Pt denies fever and chills. No hx of gout or anything like the foot/toe pain he is having today Past Medical History:  
Diagnosis Date  Essential hypertension, benign 3/15/2015  
 Skin cancer 2 BCC's  Sun-damaged skin  Vitamin D deficiency 2/11/2018 Past Surgical History:  
Procedure Laterality Date  HX COLONOSCOPY  ? age 47  
 overdue-family h/o  
 HX HERNIA REPAIR    
 HX TONSILLECTOMY Current Outpatient Prescriptions on File Prior to Visit Medication Sig Dispense Refill  amLODIPine-benazepril (LOTREL) 5-10 mg per capsule TAKE 1 CAPSULE BY MOUTH DAILY. 90 Cap 1  
 bisoprolol-hydroCHLOROthiazide (ZIAC) 5-6.25 mg per tablet TAKE 1 TABLET BY MOUTH DAILY. 90 Tab 1 No current facility-administered medications on file prior to visit. No Known Allergies Family History Problem Relation Age of Onset  Heart Disease Mother  MS Father  Heart Disease Father  No Known Problems Sister Social History Social History  Marital status: SINGLE Spouse name: N/A  
 Number of children: N/A  
 Years of education: N/A Social History Main Topics  Smoking status: Never Smoker  Smokeless tobacco: Never Used  Alcohol use 7.0 oz/week 14 Standard drinks or equivalent per week Comment: occasionally  Drug use: None  Sexual activity: Not Asked Other Topics Concern  None Social History Narrative Review of Systems Constitutional: Negative for chills, diaphoresis, fever, malaise/fatigue and weight loss. Eyes: Negative for blurred vision, double vision, pain and redness. Respiratory: Negative for cough, shortness of breath and wheezing. Cardiovascular: Negative for chest pain, palpitations, orthopnea, claudication, leg swelling and PND. Musculoskeletal:  
     Left great toe pain with callous formation Right lateral aspect pain Skin: Negative for itching and rash. Neurological: Negative for dizziness, tingling, tremors, sensory change, speech change, focal weakness, seizures, loss of consciousness, weakness and headaches. Results for orders placed or performed in visit on 02/07/18 METABOLIC PANEL, COMPREHENSIVE Result Value Ref Range Glucose 123 (H) 65 - 99 mg/dL BUN 15 8 - 27 mg/dL Creatinine 1.14 0.76 - 1.27 mg/dL GFR est non-AA 68 >59 mL/min/1.73 GFR est AA 79 >59 mL/min/1.73  
 BUN/Creatinine ratio 13 10 - 24 Sodium 140 134 - 144 mmol/L Potassium 4.6 3.5 - 5.2 mmol/L Chloride 97 96 - 106 mmol/L  
 CO2 26 18 - 29 mmol/L Calcium 9.5 8.6 - 10.2 mg/dL Protein, total 7.0 6.0 - 8.5 g/dL Albumin 4.6 3.6 - 4.8 g/dL GLOBULIN, TOTAL 2.4 1.5 - 4.5 g/dL A-G Ratio 1.9 1.2 - 2.2 Bilirubin, total 0.9 0.0 - 1.2 mg/dL Alk. phosphatase 105 39 - 117 IU/L  
 AST (SGOT) 53 (H) 0 - 40 IU/L  
 ALT (SGPT) 48 (H) 0 - 44 IU/L  
LIPID PANEL Result Value Ref Range Cholesterol, total 272 (H) 100 - 199 mg/dL Triglyceride 229 (H) 0 - 149 mg/dL HDL Cholesterol 59 >39 mg/dL VLDL, calculated 46 (H) 5 - 40 mg/dL LDL, calculated 167 (H) 0 - 99 mg/dL CBC W/O DIFF Result Value Ref Range WBC 8.8 3.4 - 10.8 x10E3/uL  
 RBC 5.09 4.14 - 5.80 x10E6/uL HGB 15.6 13.0 - 17.7 g/dL HCT 45.1 37.5 - 51.0 % MCV 89 79 - 97 fL  
 MCH 30.6 26.6 - 33.0 pg  
 MCHC 34.6 31.5 - 35.7 g/dL  
 RDW 12.8 12.3 - 15.4 % PLATELET 368 706 - 566 x10E3/uL PSA W/ REFLX FREE PSA Result Value Ref Range Prostate Specific Ag 5.7 (H) 0.0 - 4.0 ng/mL Reflex Criteria Comment PSA, FREE Result Value Ref Range PSA, Free 1.08 N/A ng/mL  
 % Free PSA 18.9 % CVD REPORT Result Value Ref Range INTERPRETATION Note Physical Exam 
Visit Vitals  /76 (BP 1 Location: Left arm, BP Patient Position: Sitting)  Pulse 76  Temp 98.1 °F (36.7 °C) (Oral)  Resp 18  Ht 5' 10\" (1.778 m)  Wt 177 lb (80.3 kg)  SpO2 97%  BMI 25.4 kg/m2 Head: Normocephalic, without obvious abnormality, atraumatic Eyes: conjunctivae/corneas clear. PERRL, EOM's intact. Neck: supple, symmetrical, trachea midline, no adenopathy, thyroid: not enlarged, symmetric, no tenderness/mass/nodules, no carotid bruit and no JVD Lungs: clear to auscultation bilaterally Heart: regular rate and rhythm, S1, S2 normal, no murmur, click, rub or gallop Extremities: extremities normal, atraumatic, no cyanosis or edema. Left foot has swelling of the great toe over the medial aspect at the IP joint. He is also tender at the base of the nail medially. The tenderness does not extend proximally more than the IP area. There is no pain over the IP joint laterally. The skin over the swollen area medially is calloused, but is softer than the average callous and there are at least two 0.5 cm in diameter of white discoloration. Right foot has some tenderness over the 5th MTP joint laterally and there is a small callous over this area. This is directly over where the pain and there is no erythema or warmth. Pulses: 2+ and symmetric Lymph nodes: Cervical, supraclavicular, and axillary nodes normal. 
Neurologic: Grossly normal 
 
 
ASSESSMENT and PLAN 
  ICD-10-CM ICD-9-CM 1. Cellulitis of great toe of left foot L03.032 681.10 cephALEXin (KEFLEX) 500 mg capsule 2. Bunionette of right foot M21.621 727.1 3. Hyperlipidemia LDL goal <130 E78.5 272.4 4. Elevated PSA measurement R97.20 790.93 PSA W/ REFLX FREE PSA 5. Essential hypertension, benign I10 401.1 6. Vitamin D deficiency E55.9 268.9   
7. History of basal cell carcinomaInactive Z85.828 V10.83   
 right zygomatic area Feb 2013- Dr. Randi Mejia Diagnoses and all orders for this visit: 1. Cellulitis of great toe of left foot 
-     cephALEXin (KEFLEX) 500 mg capsule; Take 1 Cap by mouth four (4) times daily for 10 days. 2. Bunionette of right foot 3. Hyperlipidemia LDL goal <130 
 
4. Elevated PSA measurement -     PSA W/ REFLX FREE PSA 5. Essential hypertension, benign 6. Vitamin D deficiency 7. History of basal cell carcinoma Comments: 
right zygomatic area Feb 2013- Dr. Randi Mejia Follow-up Disposition: 
Return in about 3 months (around 11/28/2018), or if symptoms worsen or fail to improve, for F/u elevated PSA,, F/U HTN and CHOL. reviewed medications and side effects in detail Please call my office if there are any questions- [phone]. Discussed expected course/resolution/complications of diagnosis in detail with patient. Medication risks/benefits/costs/interactions/alternatives discussed with patient. Pt was given an after visit summary which includes diagnoses, current medications & vitals. Pt expressed understanding with the diagnosis and plan. Total 25 minutes,60 % counseling re: Patient to call if no better in 3 -4 days and prn new problems. Reviewed in detail the proper technique of checking the blood pressure- check it on an average day only, not on a stressful day, sitting, no exercise for at least 1 hour and not experiencing any new pain( chronic pain is OK). Patient encouraged to check BP sitting and standing at least once a month and to report these readings to me if > 130/ 80 on average , or if the standing BP is >  15 points lower than the sitting. Reviewed symptoms, or lack thereof, of hypertension and elevated cholesterol. Recommended a weekly \"heart check. \" I went into detail how to do this. Regular exercise is very important to your health; it helps mentally, physically, socially; it prevents injuries if done properly. Exercise, even as simple as walking 20-30 minutes daily has major benefits to your health even though your \"numbers\" are the same in the lab. See if you can add this into your daily regimen and after a few months it will become a regular habit-\"just something you do,\" like brushing your teeth. A combination of aerobic exercise and strengthening and stretching is felt to be the best for you, so this should be your ultimate goal.  
This can be done in the privacy of your home or in a group setting as at the gym  Some prefer having a , others prefer to do exercise in groups or individually. Do what \"works\" for you. You need to make it simple and \"fun,\" or you most likely you will not continue it. Reviewed his labs from February. His LDL's are elevated. He has been on a statin before, so recommended that we check his LDL level again and consider starting a statin. I was considering gout as a possibility, but factoring in the location and history of pain onset it is likely not gout. Also, discussed symptoms of concern that were noted today in the note above, treatment options( including doing nothing), when to follow up before recommended time frame. Also, answered all questions. This document was written by Darren Amezcua, as dictated by Frances Dickey MD. 
I have reviewed and agree with the above note and have made corrections where appropriate Max Meade M.D.

## 2018-09-03 ENCOUNTER — TELEPHONE (OUTPATIENT)
Dept: FAMILY MEDICINE CLINIC | Age: 63
End: 2018-09-03

## 2018-09-03 NOTE — TELEPHONE ENCOUNTER
Left great toe better, but not back to .normal; encouraged to finish antibiotic  And soak toe inn warm water until no pain for a week and also push the medial skin around the nail( where ingrown) medially annika regular basis daily after soaking and after that do same thing after he talisha for the next 2-3 months to prevent ingrowing again. Referred to Dr. Annah Harada for several other foot concerns- mainly calluses and aches and pains from those.

## 2018-09-11 ENCOUNTER — HOSPITAL ENCOUNTER (OUTPATIENT)
Dept: LAB | Age: 63
Discharge: HOME | End: 2018-09-11

## 2018-09-18 ENCOUNTER — TELEPHONE (OUTPATIENT)
Dept: FAMILY MEDICINE CLINIC | Age: 63
End: 2018-09-18

## 2018-09-18 DIAGNOSIS — M10.072 ACUTE IDIOPATHIC GOUT INVOLVING TOE OF LEFT FOOT: Primary | ICD-10-CM

## 2018-09-18 NOTE — TELEPHONE ENCOUNTER
Antibiotics are not given for exposure- cleaning like he did with soap- hibiclens I believe he said , should be adequate; if he feels it is getting infected, needs appt with me or podiatrist; I added Uric acid to his pending lab work- he needs to get his lab work done

## 2018-09-18 NOTE — TELEPHONE ENCOUNTER
Patient scheduled for follow up visit for gout. He states he is concerned that he might need an antibiotic for his foot since he stepped in water last night. States he spoke with Dr Sola Weinstein last pm so he is aware of what is going on

## 2018-09-27 LAB
ALBUMIN SERPL-MCNC: 4.2 G/DL (ref 3.6–4.8)
ALBUMIN/GLOB SERPL: 1.9 {RATIO} (ref 1.2–2.2)
ALP SERPL-CCNC: 105 IU/L (ref 39–117)
ALT SERPL-CCNC: 33 IU/L (ref 0–44)
AST SERPL-CCNC: 27 IU/L (ref 0–40)
BILIRUB SERPL-MCNC: 0.4 MG/DL (ref 0–1.2)
BUN SERPL-MCNC: 19 MG/DL (ref 8–27)
BUN/CREAT SERPL: 18 (ref 10–24)
CALCIUM SERPL-MCNC: 9.4 MG/DL (ref 8.6–10.2)
CHLORIDE SERPL-SCNC: 99 MMOL/L (ref 96–106)
CHOLEST SERPL-MCNC: 208 MG/DL (ref 100–199)
CO2 SERPL-SCNC: 25 MMOL/L (ref 20–29)
CREAT SERPL-MCNC: 1.03 MG/DL (ref 0.76–1.27)
ERYTHROCYTE [DISTWIDTH] IN BLOOD BY AUTOMATED COUNT: 12.7 % (ref 12.3–15.4)
GLOBULIN SER CALC-MCNC: 2.2 G/DL (ref 1.5–4.5)
GLUCOSE SERPL-MCNC: 115 MG/DL (ref 65–99)
HCT VFR BLD AUTO: 43 % (ref 37.5–51)
HDLC SERPL-MCNC: 43 MG/DL
HGB BLD-MCNC: 14.7 G/DL (ref 13–17.7)
INTERPRETATION, 910389: NORMAL
LDLC SERPL CALC-MCNC: 131 MG/DL (ref 0–99)
MCH RBC QN AUTO: 29.7 PG (ref 26.6–33)
MCHC RBC AUTO-ENTMCNC: 34.2 G/DL (ref 31.5–35.7)
MCV RBC AUTO: 87 FL (ref 79–97)
PLATELET # BLD AUTO: 253 X10E3/UL (ref 150–379)
POTASSIUM SERPL-SCNC: 4.3 MMOL/L (ref 3.5–5.2)
PROT SERPL-MCNC: 6.4 G/DL (ref 6–8.5)
PSA FREE MFR SERPL: 16.3 %
PSA FREE SERPL-MCNC: 0.88 NG/ML
PSA SERPL-MCNC: 5.4 NG/ML (ref 0–4)
RBC # BLD AUTO: 4.95 X10E6/UL (ref 4.14–5.8)
REFLEX CRITERIA: ABNORMAL
SODIUM SERPL-SCNC: 138 MMOL/L (ref 134–144)
TRIGL SERPL-MCNC: 168 MG/DL (ref 0–149)
URATE SERPL-MCNC: 6.9 MG/DL (ref 3.7–8.6)
VLDLC SERPL CALC-MCNC: 34 MG/DL (ref 5–40)
WBC # BLD AUTO: 7.2 X10E3/UL (ref 3.4–10.8)

## 2018-09-28 ENCOUNTER — OFFICE VISIT (OUTPATIENT)
Dept: FAMILY MEDICINE CLINIC | Age: 63
End: 2018-09-28

## 2018-09-28 VITALS
HEART RATE: 75 BPM | DIASTOLIC BLOOD PRESSURE: 80 MMHG | SYSTOLIC BLOOD PRESSURE: 120 MMHG | HEIGHT: 70 IN | OXYGEN SATURATION: 98 % | WEIGHT: 172.8 LBS | BODY MASS INDEX: 24.74 KG/M2 | RESPIRATION RATE: 14 BRPM | TEMPERATURE: 98.8 F

## 2018-09-28 DIAGNOSIS — E55.9 VITAMIN D DEFICIENCY: Chronic | ICD-10-CM

## 2018-09-28 DIAGNOSIS — E78.5 HYPERLIPIDEMIA LDL GOAL <130: ICD-10-CM

## 2018-09-28 DIAGNOSIS — M10.072 ACUTE IDIOPATHIC GOUT INVOLVING TOE OF LEFT FOOT: Primary | ICD-10-CM

## 2018-09-28 DIAGNOSIS — I10 ESSENTIAL HYPERTENSION, BENIGN: ICD-10-CM

## 2018-09-28 RX ORDER — ALLOPURINOL 100 MG/1
100 TABLET ORAL DAILY
Qty: 90 TAB | Refills: 3 | Status: SHIPPED | OUTPATIENT
Start: 2018-09-28 | End: 2019-05-01 | Stop reason: SDUPTHER

## 2018-09-28 NOTE — PROGRESS NOTES
Chief Complaint   Patient presents with    Gout     1. Have you been to the ER, urgent care clinic since your last visit? Hospitalized since your last visit? No    2. Have you seen or consulted any other health care providers outside of the [address] [place] [address] since your last visit? Include any pap smears or colon screening.  No     Colonoscopy- Overdue    Shingrex Vaccine- Yes 2/2017

## 2018-09-28 NOTE — PROGRESS NOTES
HISTORY OF PRESENT ILLNESS  HPI  Radha Myers is a 58 y.o. Male with a history of HTN, gout, vitamin D deficiency and hyperlipidemia with LDL goal <130, who presents at the office today for gout. Pt had lab work done 9/26. Pt's uric acid was elevated. Pt's stitches have been removed from his left great toe. He notes that the pain is much improved. Past Medical History:   Diagnosis Date    Acute idiopathic gout involving toe of left foot 9/18/2018    Essential hypertension, benign 3/15/2015    Gout 09/26/2018    Skin cancer     2 BCC's    Sun-damaged skin     Vitamin D deficiency 2/11/2018     Past Surgical History:   Procedure Laterality Date    HX COLONOSCOPY  ? age 47    overdue-family h/o    HX HERNIA REPAIR      HX TONSILLECTOMY       Current Outpatient Prescriptions on File Prior to Visit   Medication Sig Dispense Refill    amLODIPine-benazepril (LOTREL) 5-10 mg per capsule TAKE 1 CAPSULE BY MOUTH DAILY. 90 Cap 1    bisoprolol-hydroCHLOROthiazide (ZIAC) 5-6.25 mg per tablet TAKE 1 TABLET BY MOUTH DAILY. 90 Tab 1     No current facility-administered medications on file prior to visit. No Known Allergies  Family History   Problem Relation Age of Onset    Heart Disease Mother     MS Father     Heart Disease Father     No Known Problems Sister      Social History     Social History    Marital status: SINGLE     Spouse name: N/A    Number of children: N/A    Years of education: N/A     Social History Main Topics    Smoking status: Never Smoker    Smokeless tobacco: Never Used    Alcohol use 7.0 oz/week     14 Standard drinks or equivalent per week      Comment: occasionally    Drug use: None    Sexual activity: Not Asked     Other Topics Concern    None     Social History Narrative             Review of Systems   Constitutional: Negative for chills, diaphoresis, fever, malaise/fatigue and weight loss. Eyes: Negative for blurred vision, double vision, pain and redness. Respiratory: Negative for cough, shortness of breath and wheezing. Cardiovascular: Negative for chest pain, palpitations, orthopnea, claudication, leg swelling and PND. Skin: Negative for itching and rash. Healing wound on left great toe   Neurological: Negative for dizziness, tingling, tremors, sensory change, speech change, focal weakness, seizures, loss of consciousness, weakness and headaches. Results for orders placed or performed in visit on 08/28/18   PSA W/ REFLX FREE PSA   Result Value Ref Range    Prostate Specific Ag 5.4 (H) 0.0 - 4.0 ng/mL    Reflex Criteria Comment    LIPID PANEL   Result Value Ref Range    Cholesterol, total 208 (H) 100 - 199 mg/dL    Triglyceride 168 (H) 0 - 149 mg/dL    HDL Cholesterol 43 >39 mg/dL    VLDL, calculated 34 5 - 40 mg/dL    LDL, calculated 131 (H) 0 - 99 mg/dL   CBC W/O DIFF   Result Value Ref Range    WBC 7.2 3.4 - 10.8 x10E3/uL    RBC 4.95 4.14 - 5.80 x10E6/uL    HGB 14.7 13.0 - 17.7 g/dL    HCT 43.0 37.5 - 51.0 %    MCV 87 79 - 97 fL    MCH 29.7 26.6 - 33.0 pg    MCHC 34.2 31.5 - 35.7 g/dL    RDW 12.7 12.3 - 15.4 %    PLATELET 080 897 - 716 H87E6/EV   METABOLIC PANEL, COMPREHENSIVE   Result Value Ref Range    Glucose 115 (H) 65 - 99 mg/dL    BUN 19 8 - 27 mg/dL    Creatinine 1.03 0.76 - 1.27 mg/dL    GFR est non-AA 76 >59 mL/min/1.73    GFR est AA 88 >59 mL/min/1.73    BUN/Creatinine ratio 18 10 - 24    Sodium 138 134 - 144 mmol/L    Potassium 4.3 3.5 - 5.2 mmol/L    Chloride 99 96 - 106 mmol/L    CO2 25 20 - 29 mmol/L    Calcium 9.4 8.6 - 10.2 mg/dL    Protein, total 6.4 6.0 - 8.5 g/dL    Albumin 4.2 3.6 - 4.8 g/dL    GLOBULIN, TOTAL 2.2 1.5 - 4.5 g/dL    A-G Ratio 1.9 1.2 - 2.2    Bilirubin, total 0.4 0.0 - 1.2 mg/dL    Alk.  phosphatase 105 39 - 117 IU/L    AST (SGOT) 27 0 - 40 IU/L    ALT (SGPT) 33 0 - 44 IU/L   CVD REPORT   Result Value Ref Range    INTERPRETATION Note    PSA, FREE   Result Value Ref Range    PSA, Free 0.88 N/A ng/mL    % Free PSA 16.3 %   URIC ACID   Result Value Ref Range    Uric acid 6.9 3.7 - 8.6 mg/dL         Physical Exam  Visit Vitals    /80 (BP 1 Location: Left arm, BP Patient Position: Sitting)    Pulse 75    Temp 98.8 °F (37.1 °C) (Oral)    Resp 14    Ht 5' 10\" (1.778 m)    Wt 172 lb 12.8 oz (78.4 kg)    SpO2 98%    BMI 24.79 kg/m2       Extremities: Left foot has a healing wound over the great toe. No signs of active infection and no discomfort at MTP joint. He has bilateral high arches and mild discomfort to palpation of the calcaneal area bilaterally. ASSESSMENT and PLAN    ICD-10-CM ICD-9-CM    1. Acute idiopathic gout involving toe of left foot M10.072 274.01     left great toe- IP joint- Sept 2018 1st attack- uric acid 6.9   2. Essential hypertension, benign I10 401.1    3. Hyperlipidemia LDL goal <130 E78.5 272.4    4. Vitamin D deficiency E55.9 268.9      Diagnoses and all orders for this visit:    1. Acute idiopathic gout involving toe of left foot  Comments:  left great toe- IP joint- Sept 2018 1st attack- uric acid 6.9    2. Essential hypertension, benign    3. Hyperlipidemia LDL goal <130    4. Vitamin D deficiency    Other orders  -     allopurinol (ZYLOPRIM) 100 mg tablet; Take 1 Tab by mouth daily. Follow-up Disposition:  Return in about 3 months (around 12/28/2018), or BP OOC, for F/U HTN and CHOL, gout. lab results and schedule of future lab studies reviewed with patient  reviewed diet, exercise and weight control  cardiovascular risk and specific lipid/LDL goals reviewed  reviewed medications and side effects in detail  Please call my office if there are any questions- [phone]. I will arrange for follow up after the lab tests done from today return  Recommended a weekly \"heart check. \" I went into detail how to do this. Call for refills on medications as needed. Discussed expected course/resolution/complications of diagnosis in detail with patient.    Medication risks/benefits/costs/interactions/alternatives discussed with patient. Pt was given an after visit summary which includes diagnoses, current medications & vitals. Pt expressed understanding with the diagnosis and plan. Total 25 minutes,60 % counseling re: Reviewed symptoms, or lack thereof, of hypertension and elevated cholesterol. Reviewed in detail the proper technique of checking the blood pressure- check it on a verage day only, not on a stressful day, sitting, no exercise for at least 1 hour and not experiencing any new pain( chronic pain is OK). Patient encouraged to check BP sitting and standing at least once a month and to report these readings to me if > 130/ 80 on average , or if the standing BP is >  15 points lower than the sitting. Recommended a weekly \"heart check. \" I went into detail how to do this. Regular exercise is very important to your health; it helps mentally, physically, socially; it prevents injuries if done properly. Exercise, even as simple as walking 20-30 minutes daily has major benefits to your health even though your \"numbers\" are the same in the lab. See if you can add this into your daily regimen and after a few months it will become a regular habit-\"just something you do,\" like brushing your teeth. A combination of aerobic exercise and strengthening and stretching is felt to be the best for you, so this should be your ultimate goal.   This can be done in the privacy of your home or in a group setting as at the gym  Some prefer having a , others prefer to do exercise in groups or individually. Do what \"works\" for you. You need to make it simple and \"fun,\" or you most likely you will not continue it. Reviewed pt's lab work from 9/26 and informed him that his PSA has improved. He has normal liver and kidney function. His LDL has improved from 168 to 131. His uric acid was 6.9. Briefly discussed the CRC and will get back to him about that.       We will recheck tHe PSA in 3 months when he is due to recheck the cholesterol, etc  If has any attacks of gout, he should call back at that point for additional instructions. Long discussion about gout and it's relationship to uric acid and that it is partially related to diet as well. Goal is to reduce attacks and this is accomplished with uric acid reduction- started on allopurinol and sample of Mitigare( colchicine 0.6 # 15 ) to be taken daily to prevent gout flare up. Also, discussed symptoms of concern that were noted today in the note above, treatment options( including doing nothing), when to follow up before recommended time frame. Also, answered all questions. This document was written by Suri Pepper, as dictated by Tommy Elizabeth MD.  I have reviewed and agree with the above note and have made corrections where appropriate Max Manrique M.D.

## 2018-10-07 NOTE — PROGRESS NOTES
Discussed labs at 9/28 office visit. Told him I would get back to him about his cardiac risk number. .. Your cholesterol numbers are much better compared to the last 2 years with LDL much better and triglycerides in general better. However, the American Heart Association Heart Risk Calculation( a new way to calculate your risk of a stroke or heart attack in the future) shows your risk to be 13.8 % chance of a heart attack or stroke in the next 10 years( mainly due to your age); a statin ( a cholesterol lowering medication )  is recommended if that # is > 7.5 %. I want you to start rosuvastatin 10 mg #30, 1R and recheck a fasting office visit in 4-6 weeks. If you have questions before you start on this, please make an appointment and I will be glad to answer those. Also, I would recommend 81 mg enteric coated aspirin daily, preferably after food because it is recommended if your risk is > 10 %.

## 2018-10-16 DIAGNOSIS — I10 ESSENTIAL HYPERTENSION, BENIGN: ICD-10-CM

## 2018-10-18 DIAGNOSIS — I10 ESSENTIAL HYPERTENSION, BENIGN: ICD-10-CM

## 2018-10-18 RX ORDER — BISOPROLOL FUMARATE AND HYDROCHLOROTHIAZIDE 5; 6.25 MG/1; MG/1
TABLET ORAL
Qty: 90 TAB | Refills: 1 | Status: SHIPPED | OUTPATIENT
Start: 2018-10-18 | End: 2018-10-18 | Stop reason: SDUPTHER

## 2018-10-18 RX ORDER — AMLODIPINE AND BENAZEPRIL HYDROCHLORIDE 5; 10 MG/1; MG/1
CAPSULE ORAL
Qty: 90 CAP | Refills: 1 | Status: SHIPPED | OUTPATIENT
Start: 2018-10-18 | End: 2019-04-10 | Stop reason: SDUPTHER

## 2018-10-18 NOTE — TELEPHONE ENCOUNTER
Pharm on file verified.     Last office visit 09/28/18    Requested Prescriptions     Pending Prescriptions Disp Refills    bisoprolol-hydroCHLOROthiazide (ZIAC) 5-6.25 mg per tablet 90 Tab 1

## 2018-10-20 RX ORDER — BISOPROLOL FUMARATE AND HYDROCHLOROTHIAZIDE 5; 6.25 MG/1; MG/1
TABLET ORAL
Qty: 90 TAB | Refills: 1 | Status: SHIPPED | OUTPATIENT
Start: 2018-10-20 | End: 2019-04-23 | Stop reason: SDUPTHER

## 2019-04-23 DIAGNOSIS — I10 ESSENTIAL HYPERTENSION, BENIGN: ICD-10-CM

## 2019-04-24 RX ORDER — BISOPROLOL FUMARATE AND HYDROCHLOROTHIAZIDE 5; 6.25 MG/1; MG/1
TABLET ORAL
Qty: 90 TAB | Refills: 0 | Status: SHIPPED | OUTPATIENT
Start: 2019-04-24 | End: 2019-05-01 | Stop reason: SDUPTHER

## 2019-05-01 ENCOUNTER — OFFICE VISIT (OUTPATIENT)
Dept: FAMILY MEDICINE CLINIC | Age: 64
End: 2019-05-01

## 2019-05-01 VITALS
HEIGHT: 70 IN | DIASTOLIC BLOOD PRESSURE: 72 MMHG | BODY MASS INDEX: 25.65 KG/M2 | RESPIRATION RATE: 18 BRPM | WEIGHT: 179.2 LBS | SYSTOLIC BLOOD PRESSURE: 120 MMHG | TEMPERATURE: 98.7 F | HEART RATE: 74 BPM | OXYGEN SATURATION: 98 %

## 2019-05-01 DIAGNOSIS — Z87.39 HISTORY OF GOUT: ICD-10-CM

## 2019-05-01 DIAGNOSIS — E55.9 VITAMIN D DEFICIENCY: Chronic | ICD-10-CM

## 2019-05-01 DIAGNOSIS — K59.01 CONSTIPATION BY DELAYED COLONIC TRANSIT: ICD-10-CM

## 2019-05-01 DIAGNOSIS — K64.4 EXTERNAL HEMORRHOID, BLEEDING: ICD-10-CM

## 2019-05-01 DIAGNOSIS — I10 ESSENTIAL HYPERTENSION, BENIGN: Primary | ICD-10-CM

## 2019-05-01 DIAGNOSIS — Z11.59 NEED FOR HEPATITIS C SCREENING TEST: ICD-10-CM

## 2019-05-01 RX ORDER — AMLODIPINE AND BENAZEPRIL HYDROCHLORIDE 5; 10 MG/1; MG/1
CAPSULE ORAL
Qty: 90 CAP | Refills: 1 | Status: SHIPPED | OUTPATIENT
Start: 2019-05-01 | End: 2019-11-12 | Stop reason: SDUPTHER

## 2019-05-01 RX ORDER — ROSUVASTATIN CALCIUM 5 MG/1
5 TABLET, COATED ORAL
Qty: 90 TAB | Refills: 1 | Status: SHIPPED | OUTPATIENT
Start: 2019-05-01 | End: 2021-01-17

## 2019-05-01 RX ORDER — ALLOPURINOL 100 MG/1
100 TABLET ORAL DAILY
Qty: 90 TAB | Refills: 3 | Status: SHIPPED | OUTPATIENT
Start: 2019-05-01 | End: 2019-11-12 | Stop reason: SDUPTHER

## 2019-05-01 RX ORDER — BISOPROLOL FUMARATE AND HYDROCHLOROTHIAZIDE 5; 6.25 MG/1; MG/1
1 TABLET ORAL DAILY
Qty: 90 TAB | Refills: 1 | Status: SHIPPED | OUTPATIENT
Start: 2019-05-01 | End: 2019-11-12 | Stop reason: SDUPTHER

## 2019-05-01 NOTE — PROGRESS NOTES
Chief Complaint Patient presents with  Hypertension 6 mo f/u  Vitamin D Deficiency 6 month f/u  Abdominal Pain 1. Have you been to the ER, urgent care clinic since your last visit? Hospitalized since your last visit? No 
 
2. Have you seen or consulted any other health care providers outside of the [address] [place] [address] since your last visit? Include any pap smears or colon screening.  No

## 2019-05-01 NOTE — PROGRESS NOTES
HISTORY OF PRESENT ILLNESS 
HPI Mayur Reilly is a 58 y.o. Male with a history of HTN, BCC, gout, vitamin D deficiency and hyperlipidemia with LDL goal <130, who presents at the office today for a follow up of his HTN and vitamin D. Pt is not taking vitamin D. Pt reports that he started having issues with constipation this morning, followed by diarrhea. He also noticed an early hemorrhoid, and had some slight bleeding from that. Pt has a history of hemorrhoids, and has had surgery for them twice in the past.  
Pt denies unusual SOB, chest pain, and any recent ER visits or hospitalizations. Past Medical History:  
Diagnosis Date  Acute idiopathic gout involving toe of left foot 9/18/2018  Essential hypertension, benign 3/15/2015  Gout 09/26/2018  Skin cancer 2 BCC's  Sun-damaged skin  Vitamin D deficiency 2/11/2018 Past Surgical History:  
Procedure Laterality Date  HX COLONOSCOPY  ? age 47  
 overdue-family h/o  
 HX HERNIA REPAIR    
 HX TONSILLECTOMY No current outpatient medications on file prior to visit. No current facility-administered medications on file prior to visit. No Known Allergies Family History Problem Relation Age of Onset  Heart Disease Mother  MS Father  Heart Disease Father  No Known Problems Sister Social History Socioeconomic History  Marital status: SINGLE Spouse name: Not on file  Number of children: Not on file  Years of education: Not on file  Highest education level: Not on file Tobacco Use  Smoking status: Never Smoker  Smokeless tobacco: Never Used Substance and Sexual Activity  Alcohol use: Yes Alcohol/week: 7.0 oz Types: 14 Standard drinks or equivalent per week Comment: occasionally Review of Systems Constitutional: Negative for chills, diaphoresis, fever, malaise/fatigue and weight loss. Eyes: Negative for blurred vision, double vision, pain and redness. Respiratory: Negative for cough, shortness of breath and wheezing. Cardiovascular: Negative for chest pain, palpitations, orthopnea, claudication, leg swelling and PND. Gastrointestinal: Positive for constipation and diarrhea. Skin: Negative for itching and rash. Neurological: Negative for dizziness, tingling, tremors, sensory change, speech change, focal weakness, seizures, loss of consciousness, weakness and headaches. Results for orders placed or performed in visit on 08/28/18 PSA W/ REFLX FREE PSA Result Value Ref Range Prostate Specific Ag 5.4 (H) 0.0 - 4.0 ng/mL Reflex Criteria Comment LIPID PANEL Result Value Ref Range Cholesterol, total 208 (H) 100 - 199 mg/dL Triglyceride 168 (H) 0 - 149 mg/dL HDL Cholesterol 43 >39 mg/dL VLDL, calculated 34 5 - 40 mg/dL LDL, calculated 131 (H) 0 - 99 mg/dL CBC W/O DIFF Result Value Ref Range WBC 7.2 3.4 - 10.8 x10E3/uL  
 RBC 4.95 4.14 - 5.80 x10E6/uL HGB 14.7 13.0 - 17.7 g/dL HCT 43.0 37.5 - 51.0 % MCV 87 79 - 97 fL  
 MCH 29.7 26.6 - 33.0 pg  
 MCHC 34.2 31.5 - 35.7 g/dL  
 RDW 12.7 12.3 - 15.4 % PLATELET 398 603 - 379 x10E3/uL METABOLIC PANEL, COMPREHENSIVE Result Value Ref Range Glucose 115 (H) 65 - 99 mg/dL BUN 19 8 - 27 mg/dL Creatinine 1.03 0.76 - 1.27 mg/dL GFR est non-AA 76 >59 mL/min/1.73 GFR est AA 88 >59 mL/min/1.73  
 BUN/Creatinine ratio 18 10 - 24 Sodium 138 134 - 144 mmol/L Potassium 4.3 3.5 - 5.2 mmol/L Chloride 99 96 - 106 mmol/L  
 CO2 25 20 - 29 mmol/L Calcium 9.4 8.6 - 10.2 mg/dL Protein, total 6.4 6.0 - 8.5 g/dL Albumin 4.2 3.6 - 4.8 g/dL GLOBULIN, TOTAL 2.2 1.5 - 4.5 g/dL A-G Ratio 1.9 1.2 - 2.2 Bilirubin, total 0.4 0.0 - 1.2 mg/dL Alk. phosphatase 105 39 - 117 IU/L  
 AST (SGOT) 27 0 - 40 IU/L  
 ALT (SGPT) 33 0 - 44 IU/L  
CVD REPORT Result Value Ref Range INTERPRETATION Note   
PSA, FREE Result Value Ref Range PSA, Free 0.88 N/A ng/mL  
 % Free PSA 16.3 % URIC ACID Result Value Ref Range Uric acid 6.9 3.7 - 8.6 mg/dL Physical Exam 
Visit Vitals /72 (BP 1 Location: Right arm, BP Patient Position: Sitting) Pulse 74 Temp 98.7 °F (37.1 °C) (Oral) Resp 18 Ht 5' 10\" (1.778 m) Wt 179 lb 3.2 oz (81.3 kg) SpO2 98% BMI 25.71 kg/m² Head: Normocephalic, without obvious abnormality, atraumatic Eyes: conjunctivae/corneas clear. PERRL, EOM's intact. Neck: supple, symmetrical, trachea midline, no adenopathy, thyroid: not enlarged, symmetric, no tenderness/mass/nodules, no carotid bruit and no JVD Lungs: clear to auscultation bilaterally Heart: regular rate and rhythm, S1, S2 normal, no murmur, click, rub or gallop Extremities: extremities normal, atraumatic, no cyanosis or edema Rectal: Deferred, as he is not feeling well. Pulses: 2+ and symmetric Lymph nodes: Cervical, supraclavicular, and axillary nodes normal. 
Neurologic: Grossly normal 
 
 
ASSESSMENT and PLAN 
  ICD-10-CM ICD-9-CM 1. Essential hypertension, benign Y44 993.9 METABOLIC PANEL, COMPREHENSIVE  
   bisoprolol-hydroCHLOROthiazide (ZIAC) 5-6.25 mg per tablet  
   amLODIPine-benazepril (LOTREL) 5-10 mg per capsule 2. Vitamin D deficiency E55.9 268.9 3. History of gout Z87.39 V12.29 URIC ACID 4. Constipation by delayed colonic transit K59.01 564.01   
5. External hemorrhoid, bleeding K64.4 455.5 6. Need for hepatitis C screening test Z11.59 V73.89 HEPATITIS C AB Diagnoses and all orders for this visit: 1. Essential hypertension, benign -     METABOLIC PANEL, COMPREHENSIVE 
-     bisoprolol-hydroCHLOROthiazide (ZIAC) 5-6.25 mg per tablet; Take 1 Tab by mouth daily. -     amLODIPine-benazepril (LOTREL) 5-10 mg per capsule; TAKE 1 CAPSULE BY MOUTH DAILY. 2. Vitamin D deficiency 3.  History of gout 
-     URIC ACID 
 
 4. Constipation by delayed colonic transit 5. External hemorrhoid, bleeding 6. Need for hepatitis C screening test 
-     HEPATITIS C AB Other orders 
-     allopurinol (ZYLOPRIM) 100 mg tablet; Take 1 Tab by mouth daily. -     rosuvastatin (CRESTOR) 5 mg tablet; Take 1 Tab by mouth nightly. Follow-up and Dispositions · Return in about 6 months (around 11/1/2019) for F/U HTN and CHOL, f/u Vitamin D deficiency, use of statin for increased CV risk. lab results and schedule of future lab studies reviewed with patient 
reviewed diet, exercise and weight control 
cardiovascular risk and specific lipid/LDL goals reviewed 
reviewed medications and side effects in detail Please call my office if there are any questions- [phone]. I will arrange for follow up after the lab tests done from today return Recommended a weekly \"heart check. \" I went into detail how to do this. Call for refills on medications as needed. Discussed expected course/resolution/complications of diagnosis in detail with patient. Medication risks/benefits/costs/interactions/alternatives discussed with patient. Pt was given an after visit summary which includes diagnoses, current medications & vitals. Pt expressed understanding with the diagnosis and plan. Total 25 minutes,60 % counseling re: Recommended a weekly \"heart check. \" I went into detail how to do this. Regular exercise is very important to your health; it helps mentally, physically, socially; it prevents injuries if done properly. Exercise, even as simple as walking 20-30 minutes daily has major benefits to your health even though your \"numbers\" are the same in the lab. See if you can add this into your daily regimen and after a few months it will become a regular habit-\"just something you do,\" like brushing your teeth.    
 A combination of aerobic exercise and strengthening and stretching is felt to be the best for you, so this should be your ultimate goal.  
This can be done in the privacy of your home or in a group setting as at the gym  Some prefer having a , others prefer to do exercise in groups or individually. Do what \"works\" for you. You need to make it simple and \"fun,\" or you most likely you will not continue it. Review of  the proper technique of checking the blood pressure- check it on an average day only, not on a stressful day, sitting, no exercise for at least 1 hour and not experiencing any new pain( chronic pain is OK). Patient encouraged to check BP sitting and standing at least once a month and to report these readings to me if > 140/ 90 on average , or if the standing BP is >  15 points lower than the sitting. Always check it twice and if there is > 5 points decrease from the previous reading( top reading or systolic) keep checking it until it does not drop 5 points. Write only this final reading down, not the preceding readings. If out of these readings there is only 1 out of 4 or less > 878, or > 90 diastolic then your blood pressure is OK; it needs further treatment if it is above this. Also, don't forget,  as noted above, to check your blood pressure standing once a month; this is to detect a drop in your BP that might lead to fainting and serious injury; you check it standing with your arm hanging straight down and relaxed. Check it twice waiting 1 minute between the two readings. If, with either one of these 2 readings there is a > 15 point drop of the systolic compared to your sitting pressure( done before the standing BP), then let me know. Following these guidelines, continue to check your BP and write down only the ones described above and it will help me to effectively treat your blood pressure. Reviewed symptoms, or lack thereof, of hypertension and elevated cholesterol. BMI is significantly elevated- in the overweight range.  I reviewed diet, exercise and weight control. Discussed weight control in detail, the importance of mainly decreased carbs, and for weight maintenance, exercise; discussed different diets and that it isn't as important to watch the type of foods as it is to decrease calorie intake no matter what type of diet you do, etc.  
 
 
Pt will return another day when he is feeling better to do his lab work. Pt's uric acid was 6.9 prior to allopurinol. The goal for uric acid is below 6.0. Pt has excellent BP control and we will continue the same medications. Because his cardiac risk was over 13% when calculated last year, I suggested that he start taking Crestor. He was apparently on a statin in the past as prescribed by his previous doctor, and had aching with the medication. To ensure he does not end up taking the same statin, he will contact his doctor to find out which it was. I informed him that aching caused by one statin does not prevent taking a different one. Pt had a normal colonoscopy in November 2018 and was told that he can repeat in 10 years. Also, discussed symptoms of concern that were noted today in the note above, treatment options( including doing nothing), when to follow up before recommended time frame. Also, answered all questions. This document was written by Jones Fischer, as dictated by Shira Redding MD. 
I have reviewed and agree with the above note and have made corrections where appropriate Max Way M.D.

## 2019-10-30 ENCOUNTER — TELEPHONE (OUTPATIENT)
Dept: FAMILY MEDICINE CLINIC | Age: 64
End: 2019-10-30

## 2019-10-30 DIAGNOSIS — M10.279 ACUTE DRUG-INDUCED GOUT INVOLVING TOE, UNSPECIFIED LATERALITY: Primary | ICD-10-CM

## 2019-10-30 RX ORDER — COLCHICINE 0.6 MG/1
TABLET ORAL
Qty: 17 TAB | Refills: 0 | Status: SHIPPED | OUTPATIENT
Start: 2019-10-30 | End: 2019-11-12

## 2019-10-30 NOTE — TELEPHONE ENCOUNTER
Abdulkadir# [phone]  LOV 05/01/19      Pt is calling to talk to the nurse about a flare up with his gout on his feet. Need to get some information about what his next move. Wants to know if there is some antiinflammatory that he can take. Please contact or send it to the pharmacy.        [id number] [id number] [facility], [address] [facility] [place]  [phone]

## 2019-11-12 ENCOUNTER — OFFICE VISIT (OUTPATIENT)
Dept: FAMILY MEDICINE CLINIC | Age: 64
End: 2019-11-12

## 2019-11-12 VITALS
DIASTOLIC BLOOD PRESSURE: 76 MMHG | SYSTOLIC BLOOD PRESSURE: 128 MMHG | TEMPERATURE: 98.5 F | HEART RATE: 65 BPM | OXYGEN SATURATION: 98 % | RESPIRATION RATE: 18 BRPM | BODY MASS INDEX: 26.05 KG/M2 | HEIGHT: 70 IN | WEIGHT: 182 LBS

## 2019-11-12 DIAGNOSIS — E55.9 VITAMIN D DEFICIENCY: ICD-10-CM

## 2019-11-12 DIAGNOSIS — E78.5 HYPERLIPIDEMIA LDL GOAL <130: ICD-10-CM

## 2019-11-12 DIAGNOSIS — I10 ESSENTIAL HYPERTENSION, BENIGN: Primary | ICD-10-CM

## 2019-11-12 DIAGNOSIS — M10.279 ACUTE DRUG-INDUCED GOUT INVOLVING TOE, UNSPECIFIED LATERALITY: ICD-10-CM

## 2019-11-12 DIAGNOSIS — K59.01 CONSTIPATION BY DELAYED COLONIC TRANSIT: ICD-10-CM

## 2019-11-12 DIAGNOSIS — Z87.39 HISTORY OF GOUT: ICD-10-CM

## 2019-11-12 DIAGNOSIS — L57.8 ACTINIC SKIN DAMAGE: ICD-10-CM

## 2019-11-12 RX ORDER — COLCHICINE 0.6 MG/1
TABLET ORAL
Qty: 17 TAB | Refills: 0
Start: 2019-11-12 | End: 2020-09-10 | Stop reason: ALTCHOICE

## 2019-11-12 RX ORDER — ROSUVASTATIN CALCIUM 5 MG/1
5 TABLET, COATED ORAL
Qty: 90 TAB | Refills: 1 | Status: CANCELLED | OUTPATIENT
Start: 2019-11-12

## 2019-11-12 RX ORDER — AMLODIPINE AND BENAZEPRIL HYDROCHLORIDE 5; 10 MG/1; MG/1
CAPSULE ORAL
Qty: 90 CAP | Refills: 1 | Status: SHIPPED | OUTPATIENT
Start: 2019-11-12 | End: 2020-06-22

## 2019-11-12 RX ORDER — ALLOPURINOL 100 MG/1
200 TABLET ORAL DAILY
Qty: 180 TAB | Refills: 3 | Status: SHIPPED | OUTPATIENT
Start: 2019-11-12 | End: 2020-10-12 | Stop reason: SDUPTHER

## 2019-11-12 RX ORDER — BISOPROLOL FUMARATE AND HYDROCHLOROTHIAZIDE 5; 6.25 MG/1; MG/1
1 TABLET ORAL DAILY
Qty: 90 TAB | Refills: 1 | Status: SHIPPED | OUTPATIENT
Start: 2019-11-12 | End: 2020-06-22

## 2019-11-12 NOTE — PROGRESS NOTES
Chief Complaint   Patient presents with    Hypertension     6 month ov- pt not fasting      1. Have you been to the ER, urgent care clinic since your last visit? Hospitalized since your last visit? No    2. Have you seen or consulted any other health care providers outside of the [address] [place] [address] since your last visit? Include any pap smears or colon screening.  No

## 2019-11-12 NOTE — PROGRESS NOTES
HISTORY OF PRESENT ILLNESS  HPI  Bakari Domingo is a 71 y.o. Male with a history of HTN, BCC, gout, vitamin D deficiency and hyperlipidemia with LDL goal <130, who presents to the office today for a follow up on his HTN. Pt is not fasting today. Pt notes having gout recently in his right big toe. Pt says he finished the colchicine and it has resolved at this point. Did have some diarrhea with 3 colchicine tablets the first day; the diarrhea resolved after cutting the colchicine to 1 a day. Pt states he is still taking two allopurinol a day. First epiosode of gout was in September of 2018. He is on HCTZ for years and never had gout. Pt denies unusual SOB, chest pain, and any recent ER visits or hospitalizations. Past Medical History:   Diagnosis Date    Acute idiopathic gout involving toe of left foot 9/18/2018    Essential hypertension, benign 3/15/2015    Gout 09/26/2018    Skin cancer     2 BCC's    Sun-damaged skin     Vitamin D deficiency 2/11/2018     Past Surgical History:   Procedure Laterality Date    HX COLONOSCOPY  ? age 47    overdue-family h/o    HX HERNIA REPAIR      HX TONSILLECTOMY       Current Outpatient Medications on File Prior to Visit   Medication Sig Dispense Refill    [DISCONTINUED] colchicine 0.6 mg tablet 1 tab every 2 hrs until gout better to maximum of 3 tabs today , then twice a day after that x 2 weeks 17 Tab 0    rosuvastatin (CRESTOR) 5 mg tablet Take 1 Tab by mouth nightly. 90 Tab 1    [DISCONTINUED] bisoprolol-hydroCHLOROthiazide (ZIAC) 5-6.25 mg per tablet Take 1 Tab by mouth daily. 90 Tab 1    [DISCONTINUED] amLODIPine-benazepril (LOTREL) 5-10 mg per capsule TAKE 1 CAPSULE BY MOUTH DAILY. 90 Cap 1    [DISCONTINUED] allopurinol (ZYLOPRIM) 100 mg tablet Take 1 Tab by mouth daily. 90 Tab 3     No current facility-administered medications on file prior to visit.       No Known Allergies  Family History   Problem Relation Age of Onset    Heart Disease Mother  MS Father     Heart Disease Father     No Known Problems Sister      Social History     Socioeconomic History    Marital status: SINGLE     Spouse name: Not on file    Number of children: Not on file    Years of education: Not on file    Highest education level: Not on file   Tobacco Use    Smoking status: Never Smoker    Smokeless tobacco: Never Used   Substance and Sexual Activity    Alcohol use: Yes     Alcohol/week: 11.7 standard drinks     Types: 14 Standard drinks or equivalent per week     Comment: occasionally    Sexual activity: Yes     Partners: Female             Review of Systems   Constitutional: Negative for chills, diaphoresis, fever, malaise/fatigue and weight loss. Eyes: Negative for blurred vision, double vision, pain and redness. Respiratory: Negative for cough, shortness of breath and wheezing. Cardiovascular: Negative for chest pain, palpitations, orthopnea, claudication, leg swelling and PND. Skin: Negative for itching and rash. Neurological: Negative for dizziness, tingling, tremors, sensory change, speech change, focal weakness, seizures, loss of consciousness, weakness and headaches.      Results for orders placed or performed in visit on 08/28/18   PSA W/ REFLX FREE PSA   Result Value Ref Range    Prostate Specific Ag 5.4 (H) 0.0 - 4.0 ng/mL    Reflex Criteria Comment    LIPID PANEL   Result Value Ref Range    Cholesterol, total 208 (H) 100 - 199 mg/dL    Triglyceride 168 (H) 0 - 149 mg/dL    HDL Cholesterol 43 >39 mg/dL    VLDL, calculated 34 5 - 40 mg/dL    LDL, calculated 131 (H) 0 - 99 mg/dL   CBC W/O DIFF   Result Value Ref Range    WBC 7.2 3.4 - 10.8 x10E3/uL    RBC 4.95 4.14 - 5.80 x10E6/uL    HGB 14.7 13.0 - 17.7 g/dL    HCT 43.0 37.5 - 51.0 %    MCV 87 79 - 97 fL    MCH 29.7 26.6 - 33.0 pg    MCHC 34.2 31.5 - 35.7 g/dL    RDW 12.7 12.3 - 15.4 %    PLATELET 345 849 - 201 S84A8/HD   METABOLIC PANEL, COMPREHENSIVE   Result Value Ref Range    Glucose 115 (H) 65 - 99 mg/dL    BUN 19 8 - 27 mg/dL    Creatinine 1.03 0.76 - 1.27 mg/dL    GFR est non-AA 76 >59 mL/min/1.73    GFR est AA 88 >59 mL/min/1.73    BUN/Creatinine ratio 18 10 - 24    Sodium 138 134 - 144 mmol/L    Potassium 4.3 3.5 - 5.2 mmol/L    Chloride 99 96 - 106 mmol/L    CO2 25 20 - 29 mmol/L    Calcium 9.4 8.6 - 10.2 mg/dL    Protein, total 6.4 6.0 - 8.5 g/dL    Albumin 4.2 3.6 - 4.8 g/dL    GLOBULIN, TOTAL 2.2 1.5 - 4.5 g/dL    A-G Ratio 1.9 1.2 - 2.2    Bilirubin, total 0.4 0.0 - 1.2 mg/dL    Alk. phosphatase 105 39 - 117 IU/L    AST (SGOT) 27 0 - 40 IU/L    ALT (SGPT) 33 0 - 44 IU/L   CVD REPORT   Result Value Ref Range    INTERPRETATION Note    PSA, FREE   Result Value Ref Range    PSA, Free 0.88 N/A ng/mL    % Free PSA 16.3 %   URIC ACID   Result Value Ref Range    Uric acid 6.9 3.7 - 8.6 mg/dL         Physical Exam  Visit Vitals  /76 (BP 1 Location: Left arm, BP Patient Position: Sitting)   Pulse 65   Temp 98.5 °F (36.9 °C) (Oral)   Resp 18   Ht 5' 10\" (1.778 m)   Wt 182 lb (82.6 kg)   SpO2 98%   BMI 26.11 kg/m²       Head: Normocephalic, without obvious abnormality, atraumatic  Eyes: conjunctivae/corneas clear. PERRL, EOM's intact. Neck: supple, symmetrical, trachea midline, no adenopathy, thyroid: not enlarged, symmetric, no tenderness/mass/nodules, no carotid bruit and no JVD  Lungs: clear to auscultation bilaterally  Heart: regular rate and rhythm, S1, S2 normal, no murmur, click, rub or gallop  Extremities: extremities normal, atraumatic, no cyanosis or edema  Pulses: 2+ and symmetric  Lymph nodes: Cervical, supraclavicular, and axillary nodes normal.  Neurologic: Grossly normal      ASSESSMENT and PLAN    ICD-10-CM ICD-9-CM    1. Essential hypertension, benign I10 401.1 bisoprolol-hydroCHLOROthiazide (ZIAC) 5-6.25 mg per tablet      amLODIPine-benazepril (LOTREL) 5-10 mg per capsule      METABOLIC PANEL, COMPREHENSIVE      LIPID PANEL   2.  Hyperlipidemia LDL goal <130 C43.6 010.3 METABOLIC PANEL, COMPREHENSIVE      LIPID PANEL   3. History of gout Z87.39 V12.29 URIC ACID   4. Constipation by delayed colonic transit K59.01 564.01    5. Vitamin D deficiency E55.9 268.9    6. Actinic skin damage L57.8 692.79    7. Acute drug-induced gout involving toe, unspecified laterality M10.279 274.01 colchicine 0.6 mg tablet     Diagnoses and all orders for this visit:    1. Essential hypertension, benign  -     bisoprolol-hydroCHLOROthiazide (ZIAC) 5-6.25 mg per tablet; Take 1 Tab by mouth daily. -     amLODIPine-benazepril (LOTREL) 5-10 mg per capsule; TAKE 1 CAPSULE BY MOUTH DAILY. -     METABOLIC PANEL, COMPREHENSIVE  -     LIPID PANEL    2. Hyperlipidemia LDL goal <164  -     METABOLIC PANEL, COMPREHENSIVE  -     LIPID PANEL    3. History of gout  -     URIC ACID    4. Constipation by delayed colonic transit    5. Vitamin D deficiency    6. Actinic skin damage    7. Acute drug-induced gout involving toe, unspecified laterality  -     colchicine 0.6 mg tablet; 1 tab at onset of attack and repeat in 6 hours, continue daily until pain gone x 1 week    Other orders  -     allopurinol (ZYLOPRIM) 100 mg tablet; Take 2 Tabs by mouth daily. Follow-up and Dispositions    · Return in about 2 months (around 1/12/2020), or BP OOC, for F/U start of resuvastatin;, F/U HTN and CHOL. lab results and schedule of future lab studies reviewed with patient  reviewed diet, exercise and weight control  cardiovascular risk and specific lipid/LDL goals reviewed  reviewed medications and side effects in detail  Please call my office if there are any questions- [phone]. I will arrange for follow up after the lab tests done from today return  Recommended a weekly \"heart check. \" I went into detail how to do this. Call for refills on medications as needed. Discussed expected course/resolution/complications of diagnosis in detail with patient.    Medication risks/benefits/costs/interactions/alternatives discussed with patient. Pt was given an after visit summary which includes diagnoses, current medications & vitals. Pt expressed understanding with the diagnosis and plan. Total 25 minutes,60 % counseling re:   Recommended a weekly \"heart check. \" I went into detail how to do this. Regular exercise is very important to your health; it helps mentally, physically, socially; it prevents injuries if done properly. Exercise, even as simple as walking 20-30 minutes daily has major benefits to your health even though your \"numbers\" are the same in the lab. See if you can add this into your daily regimen and after a few months it will become a regular habit-\"just something you do,\" like brushing your teeth. A combination of aerobic exercise and strengthening and stretching is felt to be the best for you, so this should be your ultimate goal.   This can be done in the privacy of your home or in a group setting as at the gym  Some prefer having a , others prefer to do exercise in groups or individually. Do what \"works\" for you. You need to make it simple and \"fun,\" or you most likely you will not continue it. ReEncouraged him to obtain a BP cuff so he can check his BP at home. view of  the proper technique of checking the blood pressure- check it on an average day only, not on a stressful day, sitting, no exercise for at least 1 hour and not experiencing any new pain( chronic pain is OK). Patient encouraged to check BP sitting and standing at least once a month and to report these readings to me if > 140/ 90 on average , or if the standing BP is >  15 points lower than the sitting. Always check it twice and if there is > 5 points decrease from the previous reading( top reading or systolic) keep checking it until it does not drop 5 points. Write only this final reading down, not the preceding readings.   If out of these readings there is only 1 out of 4 or less > 033, or > 90 diastolic then your blood pressure is OK; it needs further treatment if it is above this. Also, don't forget,  as noted above, to check your blood pressure standing once a month; this is to detect a drop in your BP that might lead to fainting and serious injury; you check it standing with your arm hanging straight down and relaxed. Check it twice waiting 1 minute between the two readings. If, with either one of these 2 readings there is a > 15 point drop of the systolic compared to your sitting pressure( done before the standing BP), then let me know. Following these guidelines, continue to check your BP and write down only the ones described above and it will help me to effectively treat your blood pressure. Reviewed symptoms, or lack thereof, of hypertension and elevated cholesterol. Pt will return another day and do his lab work. Since the HCTZ could be contributing to his gout, we might consider stopping that but will wait on that for now. Also, discussed symptoms of concern that were noted today in the note above, treatment options( including doing nothing), when to follow up before recommended time frame. Also, answered all questions. Gave him a sample of #17 colchicine, 0.6 mg, to be used prn for gout attack, taking 1 dose and repeat prn every 8 hrs until gout better andthen taper off. Talked about the importance of staying on allopurinol and not running out. This document was written by Danyell Che, as dictated by Mathew Alves MD.  I have reviewed and agree with the above note and have made corrections where appropriate Max Montejo M.D.

## 2019-11-14 ENCOUNTER — HOSPITAL ENCOUNTER (OUTPATIENT)
Dept: LAB | Age: 64
Discharge: HOME | End: 2019-11-14
Payer: COMMERCIAL

## 2019-11-14 PROCEDURE — 80061 LIPID PANEL: CPT

## 2019-11-14 PROCEDURE — 82306 VITAMIN D 25 HYDROXY: CPT

## 2019-11-14 PROCEDURE — 84550 ASSAY OF BLOOD/URIC ACID: CPT

## 2019-11-14 PROCEDURE — 80053 COMPREHEN METABOLIC PANEL: CPT

## 2019-11-14 PROCEDURE — 36415 COLL VENOUS BLD VENIPUNCTURE: CPT

## 2019-11-15 LAB
25(OH)D3+25(OH)D2 SERPL-MCNC: 11.9 NG/ML (ref 30–100)
ALBUMIN SERPL-MCNC: 4.2 G/DL (ref 3.6–4.8)
ALBUMIN/GLOB SERPL: 2.2 {RATIO} (ref 1.2–2.2)
ALP SERPL-CCNC: 100 IU/L (ref 39–117)
ALT SERPL-CCNC: 65 IU/L (ref 0–44)
AST SERPL-CCNC: 37 IU/L (ref 0–40)
BILIRUB SERPL-MCNC: 0.4 MG/DL (ref 0–1.2)
BUN SERPL-MCNC: 15 MG/DL (ref 8–27)
BUN/CREAT SERPL: 13 (ref 10–24)
CALCIUM SERPL-MCNC: 9 MG/DL (ref 8.6–10.2)
CHLORIDE SERPL-SCNC: 97 MMOL/L (ref 96–106)
CHOLEST SERPL-MCNC: 181 MG/DL (ref 100–199)
CO2 SERPL-SCNC: 23 MMOL/L (ref 20–29)
CREAT SERPL-MCNC: 1.2 MG/DL (ref 0.76–1.27)
GLOBULIN SER CALC-MCNC: 1.9 G/DL (ref 1.5–4.5)
GLUCOSE SERPL-MCNC: 106 MG/DL (ref 65–99)
HDLC SERPL-MCNC: 36 MG/DL
INTERPRETATION, 910389: NORMAL
LDLC SERPL CALC-MCNC: 120 MG/DL (ref 0–99)
POTASSIUM SERPL-SCNC: 4.2 MMOL/L (ref 3.5–5.2)
PROT SERPL-MCNC: 6.1 G/DL (ref 6–8.5)
SODIUM SERPL-SCNC: 136 MMOL/L (ref 134–144)
TRIGL SERPL-MCNC: 125 MG/DL (ref 0–149)
URATE SERPL-MCNC: 3.8 MG/DL (ref 3.7–8.6)
VLDLC SERPL CALC-MCNC: 25 MG/DL (ref 5–40)

## 2019-11-17 NOTE — PROGRESS NOTES
GREAT NEWS!! All of your recent lab tests are normal or at goal except your Vitamin D level is very low. The glucose is better, the LDL cholesterol is better. The Uric acid is well below the recommended 5.0 at 3.8 so you should not have any gout attacks as long as you stay on the allopurinol 200 mg total( again, don't run out as it will bring on an attack). No diabetes, normal liver and kidney tests. For the low Vitamin D, start Vit D3 available OTC 5000 units daily, anytime of day but is absorbed a little better with a meal I would continue everything the same and schedule your next fasting office visit in 6 months. In addition, a physical/ Annual Wellness Visit is recommended yearly after the age of 61. Also, if you haven't started the rosuvastatin, angela ahead with that once a day,with or without food; it lowers the heart attack and stroke risk even better now with your improved LDL cholesterol.

## 2020-06-21 DIAGNOSIS — I10 ESSENTIAL HYPERTENSION, BENIGN: ICD-10-CM

## 2020-06-21 DIAGNOSIS — E78.5 HYPERLIPIDEMIA LDL GOAL <130: Primary | ICD-10-CM

## 2020-06-21 DIAGNOSIS — E55.9 VITAMIN D DEFICIENCY: ICD-10-CM

## 2020-06-22 RX ORDER — AMLODIPINE AND BENAZEPRIL HYDROCHLORIDE 5; 10 MG/1; MG/1
CAPSULE ORAL
Qty: 90 CAP | Refills: 1 | Status: SHIPPED | OUTPATIENT
Start: 2020-06-22 | End: 2020-12-18

## 2020-06-22 RX ORDER — BISOPROLOL FUMARATE AND HYDROCHLOROTHIAZIDE 5; 6.25 MG/1; MG/1
TABLET ORAL
Qty: 90 TAB | Refills: 1 | Status: SHIPPED | OUTPATIENT
Start: 2020-06-22 | End: 2020-12-17

## 2020-09-10 ENCOUNTER — TELEPHONE (OUTPATIENT)
Dept: FAMILY MEDICINE CLINIC | Age: 65
End: 2020-09-10

## 2020-09-10 DIAGNOSIS — I10 ESSENTIAL HYPERTENSION, BENIGN: ICD-10-CM

## 2020-09-10 DIAGNOSIS — Z87.39 HISTORY OF GOUT: ICD-10-CM

## 2020-09-10 DIAGNOSIS — N30.90 CYSTITIS: Primary | ICD-10-CM

## 2020-09-10 DIAGNOSIS — E55.9 VITAMIN D DEFICIENCY: ICD-10-CM

## 2020-09-10 RX ORDER — CIPROFLOXACIN 500 MG/1
500 TABLET ORAL 2 TIMES DAILY
Qty: 28 TAB | Refills: 0 | Status: SHIPPED | OUTPATIENT
Start: 2020-09-10 | End: 2020-09-24

## 2020-09-10 NOTE — ASSESSMENT & PLAN NOTE
Told to stop Bactrim, start Cipro 500 mg BID x 14 days and drink plenty of fluids. He will get a copy of his evaluation at [facility] to us.

## 2020-09-23 DIAGNOSIS — N30.90 CYSTITIS: ICD-10-CM

## 2020-09-24 RX ORDER — CIPROFLOXACIN 500 MG/1
500 TABLET ORAL 2 TIMES DAILY
Qty: 28 TAB | Refills: 0 | Status: SHIPPED | OUTPATIENT
Start: 2020-09-24 | End: 2020-10-06 | Stop reason: ALTCHOICE

## 2020-10-05 ENCOUNTER — DOCUMENTATION ONLY (OUTPATIENT)
Dept: FAMILY MEDICINE CLINIC | Age: 65
End: 2020-10-05

## 2020-10-06 DIAGNOSIS — N41.0 ACUTE ON CHRONIC PROSTATITIS: Primary | ICD-10-CM

## 2020-10-06 DIAGNOSIS — N41.1 ACUTE ON CHRONIC PROSTATITIS: Primary | ICD-10-CM

## 2020-10-06 RX ORDER — LEVOFLOXACIN 500 MG/1
500 TABLET, FILM COATED ORAL DAILY
Qty: 30 TAB | Refills: 0 | Status: SHIPPED | OUTPATIENT
Start: 2020-10-06 | End: 2020-11-09

## 2020-10-06 NOTE — PROGRESS NOTES
Records from [facility] show negative UC&S consistent with prostatitis and not cystitis.;; he continues to improve on Cipro- 500 BID 20 day course- on day 16, except continued frequent urination and slight dysuria( markedly improved); no fever or extreme fatigue for over a week now; no hematuria. Decided to have him finish the Cipro and then in 6 days, switch to Levaquin-  side effects, risk, benefit reviewed.

## 2020-10-12 RX ORDER — ALLOPURINOL 100 MG/1
200 TABLET ORAL DAILY
Qty: 180 TAB | Refills: 3 | Status: SHIPPED | OUTPATIENT
Start: 2020-10-12 | End: 2020-12-16 | Stop reason: DRUGHIGH

## 2020-10-12 NOTE — TELEPHONE ENCOUNTER
Last visit 11/12/2019 MD Luci Muhammad   Next appointment None   Previous refill encounter(s)   11/12/2019 Zyloprim #180 with 3 refills     Requested Prescriptions     Pending Prescriptions Disp Refills    allopurinoL (ZYLOPRIM) 100 mg tablet 180 Tab 3     Sig: Take 2 Tabs by mouth daily.

## 2020-11-09 DIAGNOSIS — N41.1 ACUTE ON CHRONIC PROSTATITIS: ICD-10-CM

## 2020-11-09 DIAGNOSIS — N41.0 ACUTE ON CHRONIC PROSTATITIS: ICD-10-CM

## 2020-11-09 RX ORDER — LEVOFLOXACIN 500 MG/1
TABLET, FILM COATED ORAL
Qty: 30 TAB | Refills: 0 | Status: SHIPPED | OUTPATIENT
Start: 2020-11-09 | End: 2020-12-13 | Stop reason: SDUPTHER

## 2020-12-13 ENCOUNTER — DOCUMENTATION ONLY (OUTPATIENT)
Dept: FAMILY MEDICINE CLINIC | Age: 65
End: 2020-12-13

## 2020-12-13 DIAGNOSIS — N41.0 ACUTE ON CHRONIC PROSTATITIS: ICD-10-CM

## 2020-12-13 DIAGNOSIS — N41.1 ACUTE ON CHRONIC PROSTATITIS: ICD-10-CM

## 2020-12-13 RX ORDER — LEVOFLOXACIN 500 MG/1
TABLET, FILM COATED ORAL
Qty: 30 TAB | Refills: 0 | Status: SHIPPED | OUTPATIENT
Start: 2020-12-13 | End: 2021-01-15 | Stop reason: ALTCHOICE

## 2020-12-15 ENCOUNTER — TELEPHONE (OUTPATIENT)
Dept: FAMILY MEDICINE CLINIC | Age: 65
End: 2020-12-15

## 2020-12-15 DIAGNOSIS — Z87.39 HISTORY OF GOUT: Primary | ICD-10-CM

## 2020-12-15 RX ORDER — ALLOPURINOL 100 MG/1
200 TABLET ORAL DAILY
Qty: 180 TAB | Refills: 3 | Status: CANCELLED | OUTPATIENT
Start: 2020-12-15

## 2020-12-15 NOTE — TELEPHONE ENCOUNTER
Dr. Jennifer Crook,          Patient is stating to [address] that he is to be taking 3 tabs a day of the Zyloprim 100 mg. Do not see any notes regarding increasing the amount of tabs from 2 tabs to 3 tabs. Please review and prescribe if appropriate. Thank you. Requested Prescriptions     Pending Prescriptions Disp Refills    allopurinoL (ZYLOPRIM) 100 mg tablet 180 Tab 3     Sig: Take 2 Tabs by mouth daily.

## 2020-12-16 RX ORDER — ALLOPURINOL 300 MG/1
300 TABLET ORAL DAILY
Qty: 90 TAB | Refills: 3 | Status: SHIPPED | OUTPATIENT
Start: 2020-12-16 | End: 2021-12-06

## 2020-12-16 NOTE — TELEPHONE ENCOUNTER
Allopurinol increased from 200 to 300 mg about 4 months ago for ongoing mild gout symptoms( even though he has a low uric acid); was using 3 of the 100 mg and switched today to the 300 mg tablet.

## 2020-12-16 NOTE — TELEPHONE ENCOUNTER
I spoke to him recently related to his prostate infection and told him to make an appt for 2nd week in Jan- Let's give him an appt on the 15 th. As far as the allopurinol- he had a good uric acid level one year ago on 200 mg of allopurinol, so I would ask him why he is taking the 300 mg and when did he start this? We can recheck all of this when he comes in( preferably fasting, but not mandatory. Spoke with patient and states Dr Janette Mcgrath told him to increase it to 300mg about 4 months ago.  Needs refill    appt 01/15/21

## 2020-12-17 DIAGNOSIS — I10 ESSENTIAL HYPERTENSION, BENIGN: ICD-10-CM

## 2020-12-17 RX ORDER — BISOPROLOL FUMARATE AND HYDROCHLOROTHIAZIDE 5; 6.25 MG/1; MG/1
TABLET ORAL
Qty: 90 TAB | Refills: 1 | Status: SHIPPED | OUTPATIENT
Start: 2020-12-17 | End: 2021-06-08

## 2020-12-18 DIAGNOSIS — I10 ESSENTIAL HYPERTENSION, BENIGN: ICD-10-CM

## 2020-12-18 RX ORDER — AMLODIPINE AND BENAZEPRIL HYDROCHLORIDE 5; 10 MG/1; MG/1
CAPSULE ORAL
Qty: 90 CAP | Refills: 1 | Status: SHIPPED | OUTPATIENT
Start: 2020-12-18 | End: 2021-06-23

## 2020-12-18 NOTE — TELEPHONE ENCOUNTER
PCP: Dominique Jacob MD    Last appt: 11/14/2019  Future Appointments   Date Time Provider Ronald Waddell   1/15/2021 10:15 AM Dominique Jacob MD [facility]       Requested Prescriptions     Pending Prescriptions Disp Refills    amLODIPine-benazepril (LOTREL) 5-10 mg per capsule [Pharmacy Med Name: AMLODIPINE-BENAZEPRIL 5-10 MG] 90 Cap 1     Sig: TAKE 1 CAPSULE BY MOUTH EVERY DAY

## 2021-01-15 ENCOUNTER — OFFICE VISIT (OUTPATIENT)
Dept: FAMILY MEDICINE CLINIC | Age: 66
End: 2021-01-15
Payer: MEDICARE

## 2021-01-15 VITALS
BODY MASS INDEX: 25.62 KG/M2 | HEART RATE: 88 BPM | SYSTOLIC BLOOD PRESSURE: 132 MMHG | HEIGHT: 70 IN | RESPIRATION RATE: 16 BRPM | OXYGEN SATURATION: 98 % | WEIGHT: 179 LBS | TEMPERATURE: 97.8 F | DIASTOLIC BLOOD PRESSURE: 72 MMHG

## 2021-01-15 DIAGNOSIS — N41.1 CHRONIC PROSTATITIS: ICD-10-CM

## 2021-01-15 DIAGNOSIS — N40.1 NOCTURIA ASSOCIATED WITH BENIGN PROSTATIC HYPERPLASIA: ICD-10-CM

## 2021-01-15 DIAGNOSIS — E55.9 VITAMIN D DEFICIENCY: ICD-10-CM

## 2021-01-15 DIAGNOSIS — E78.5 HYPERLIPIDEMIA LDL GOAL <130: ICD-10-CM

## 2021-01-15 DIAGNOSIS — Z71.89 ACP (ADVANCE CARE PLANNING): ICD-10-CM

## 2021-01-15 DIAGNOSIS — Z87.39 HISTORY OF GOUT: ICD-10-CM

## 2021-01-15 DIAGNOSIS — R35.1 NOCTURIA ASSOCIATED WITH BENIGN PROSTATIC HYPERPLASIA: ICD-10-CM

## 2021-01-15 DIAGNOSIS — Z00.00 MEDICARE ANNUAL WELLNESS VISIT, SUBSEQUENT: ICD-10-CM

## 2021-01-15 DIAGNOSIS — I10 ESSENTIAL HYPERTENSION, BENIGN: Primary | ICD-10-CM

## 2021-01-15 PROCEDURE — 1101F PT FALLS ASSESS-DOCD LE1/YR: CPT | Performed by: FAMILY MEDICINE

## 2021-01-15 PROCEDURE — G8754 DIAS BP LESS 90: HCPCS | Performed by: FAMILY MEDICINE

## 2021-01-15 PROCEDURE — G0463 HOSPITAL OUTPT CLINIC VISIT: HCPCS | Performed by: FAMILY MEDICINE

## 2021-01-15 PROCEDURE — G8427 DOCREV CUR MEDS BY ELIG CLIN: HCPCS | Performed by: FAMILY MEDICINE

## 2021-01-15 PROCEDURE — 99214 OFFICE O/P EST MOD 30 MIN: CPT | Performed by: FAMILY MEDICINE

## 2021-01-15 PROCEDURE — G8752 SYS BP LESS 140: HCPCS | Performed by: FAMILY MEDICINE

## 2021-01-15 PROCEDURE — G8510 SCR DEP NEG, NO PLAN REQD: HCPCS | Performed by: FAMILY MEDICINE

## 2021-01-15 PROCEDURE — G0439 PPPS, SUBSEQ VISIT: HCPCS | Performed by: FAMILY MEDICINE

## 2021-01-15 PROCEDURE — G8419 CALC BMI OUT NRM PARAM NOF/U: HCPCS | Performed by: FAMILY MEDICINE

## 2021-01-15 PROCEDURE — G8536 NO DOC ELDER MAL SCRN: HCPCS | Performed by: FAMILY MEDICINE

## 2021-01-15 PROCEDURE — 3017F COLORECTAL CA SCREEN DOC REV: CPT | Performed by: FAMILY MEDICINE

## 2021-01-15 RX ORDER — MELATONIN
1000 DAILY
COMMUNITY
End: 2021-01-17

## 2021-01-15 RX ORDER — LEVOFLOXACIN 500 MG/1
500 TABLET, FILM COATED ORAL DAILY
Qty: 30 TAB | Refills: 0 | Status: SHIPPED | OUTPATIENT
Start: 2021-01-15 | End: 2021-12-14 | Stop reason: ALTCHOICE

## 2021-01-15 NOTE — PROGRESS NOTES
Chief Complaint   Patient presents with    Hypertension     check prostate    Headache   1. Have you been to the ER, urgent care clinic since your last visit? Hospitalized since your last visit? Yes urgent care for prostate    2. Have you seen or consulted any other health care providers outside of the [address] [place] [address] since your last visit? Include any pap smears or colon screening.  No

## 2021-01-15 NOTE — PROGRESS NOTES
HISTORY OF PRESENT ILLNESS  HPI  Kasey De La Cruz is a 76 y.o. Male with a history of HTN, BCC, gout, vitamin D deficiency and hyperlipidemia with LDL goal <130, who presents at the office today c/o HA and for follow up of d these health problems. He has had recent prostatitis and has been on several months of Levaquin. He denies any problems with fever    Pt reports that he has had an intermittent HA for the past 7-8 months. Pt notes that the HA will last for a few hours, will go away for a few days, then come back. He estimates he has a couple of these HA's a week. He points to his right eye and the right posterior area of his head as the sites of HA. Pt endorses some associative dizziness and lightheadedness. Pt feels the lightheadedness comes on more when pt is standing. He denies any light or sound sensitivity. He remarks that HA's come on more after activity such as when he is reading a book. Pt says that he takes Tylenol for this with some relief. Pt reports that her saw his eye specialist for evaluation for glaucoma, finding no glaucoma. He states that he does not check his BP at home. He notes that his prostatitis symptoms of intermittent burning an urinary frequency have improved. He suspects that these residual symptoms are possibly related to diet. See previous note for symtoms that he presnted with. Pt recalls that he ate 2 hours ago. Pt remarks that he is taking vitamin D  5000 units. He estimates he has been on this dose for 6 months. Pt denies unusual SOB, chest pain, and any recent ER visits or hospitalizations.          Past Medical History:   Diagnosis Date    Acute idiopathic gout involving toe of left foot 9/18/2018    Essential hypertension, benign 3/15/2015    Gout 09/26/2018    Skin cancer     2 BCC's    Sun-damaged skin     Vitamin D deficiency 2/11/2018     Past Surgical History:   Procedure Laterality Date    HX COLONOSCOPY  ? age 47    overdue-family h/o    HX HERNIA REPAIR      HX TONSILLECTOMY       Current Outpatient Medications on File Prior to Visit   Medication Sig Dispense Refill    cholecalciferol (Vitamin D3) (1000 Units /25 mcg) tablet Take 1,000 Units by mouth daily.  amLODIPine-benazepril (LOTREL) 5-10 mg per capsule TAKE 1 CAPSULE BY MOUTH EVERY DAY 90 Cap 1    bisoprolol-hydroCHLOROthiazide (ZIAC) 5-6.25 mg per tablet TAKE 1 TABLET BY MOUTH EVERY DAY 90 Tab 1    allopurinoL (ZYLOPRIM) 300 mg tablet Take 1 Tab by mouth daily. Indications: treatment to prevent acute gout attack 90 Tab 3    rosuvastatin (CRESTOR) 5 mg tablet Take 1 Tab by mouth nightly. 90 Tab 1     No current facility-administered medications on file prior to visit. No Known Allergies  Family History   Problem Relation Age of Onset    Heart Disease Mother     MS Father     Heart Disease Father     No Known Problems Sister      Social History     Socioeconomic History    Marital status: SINGLE     Spouse name: Not on file    Number of children: Not on file    Years of education: Not on file    Highest education level: Not on file   Tobacco Use    Smoking status: Never Smoker    Smokeless tobacco: Never Used   Substance and Sexual Activity    Alcohol use: Yes     Alcohol/week: 11.7 standard drinks     Types: 14 Standard drinks or equivalent per week     Comment: occasionally    Sexual activity: Yes     Partners: Female             Review of Systems   Constitutional: Negative for chills, diaphoresis, fever, malaise/fatigue and weight loss. Eyes: Negative for blurred vision, double vision, pain and redness. Respiratory: Negative for cough, shortness of breath and wheezing. Cardiovascular: Negative for chest pain, palpitations, orthopnea, claudication, leg swelling and PND. Skin: Negative for itching and rash. Neurological: Positive for headaches.  Negative for dizziness, tingling, tremors, sensory change, speech change, focal weakness, seizures, loss of consciousness and weakness. Results for orders placed or performed in visit on 01/15/21   URIC ACID   Result Value Ref Range    Uric acid 2.4 (L) 3.5 - 7.2 MG/DL   VITAMIN D, 25 HYDROXY   Result Value Ref Range    Vitamin D 25-Hydroxy 66.2 30 - 100 ng/mL   URINALYSIS W/ REFLEX CULTURE    Specimen: Urine   Result Value Ref Range    Color YELLOW/STRAW      Appearance CLEAR CLEAR      Specific gravity 1.018 1.003 - 1.030      pH (UA) 7.0 5.0 - 8.0      Protein Negative Negative mg/dL    Glucose Negative Negative mg/dL    Ketone Negative Negative mg/dL    Bilirubin Negative Negative      Blood Negative Negative      Urobilinogen 0.2 0.2 - 1.0 EU/dL    Nitrites Negative Negative      Leukocyte Esterase Negative Negative      UA:UC IF INDICATED CULTURE NOT INDICATED BY UA RESULT CULTURE NOT INDICATED BY UA RESULT      WBC 0-4 0 - 4 /hpf    RBC 0-5 0 - 5 /hpf    Epithelial cells FEW FEW /lpf    Bacteria Negative Negative /hpf    Hyaline cast 2-5 0 - 5 /lpf   METABOLIC PANEL, COMPREHENSIVE   Result Value Ref Range    Sodium 136 136 - 145 mmol/L    Potassium 4.0 3.5 - 5.1 mmol/L    Chloride 102 97 - 108 mmol/L    CO2 29 21 - 32 mmol/L    Anion gap 5 5 - 15 mmol/L    Glucose 100 65 - 100 mg/dL    BUN 20 6 - 20 MG/DL    Creatinine 1.29 0.70 - 1.30 MG/DL    BUN/Creatinine ratio 16 12 - 20      GFR est AA >60 >60 ml/min/1.73m2    GFR est non-AA 56 (L) >60 ml/min/1.73m2    Calcium 9.3 8.5 - 10.1 MG/DL    Bilirubin, total 0.5 0.2 - 1.0 MG/DL    ALT (SGPT) 44 12 - 78 U/L    AST (SGOT) 37 15 - 37 U/L    Alk.  phosphatase 113 45 - 117 U/L    Protein, total 7.1 6.4 - 8.2 g/dL    Albumin 4.4 3.5 - 5.0 g/dL    Globulin 2.7 2.0 - 4.0 g/dL    A-G Ratio 1.6 1.1 - 2.2     LIPID PANEL   Result Value Ref Range    LIPID PROFILE          Cholesterol, total 212 (H) <200 MG/DL    Triglyceride 138 <150 MG/DL    HDL Cholesterol 65 MG/DL    LDL, calculated 119.4 (H) 0 - 100 MG/DL    VLDL, calculated 27.6 MG/DL    CHOL/HDL Ratio 3.3 0.0 - 5.0 Physical Exam  Visit Vitals  /72   Pulse 88   Temp 97.8 °F (36.6 °C)   Resp 16   Ht 5' 10\" (1.778 m)   Wt 179 lb (81.2 kg)   SpO2 98%   BMI 25.68 kg/m²         No orthostatic changes  Head: Normocephalic, without obvious abnormality, atraumatic  Eyes: conjunctivae/corneas clear. PERRL, EOM's intact. Neck: supple, symmetrical, trachea midline, no adenopathy, thyroid: not enlarged, symmetric, no tenderness/mass/nodules, no carotid bruit and no JVD  Lungs: clear to auscultation bilaterally  Heart: regular rate and rhythm, S1, S2 normal, no murmur, click, rub or gallop  Extremities: extremities normal, atraumatic, no cyanosis or edema  Pulses: 2+ and symmetric  Lymph nodes: Cervical, supraclavicular, and axillary nodes normal.  Prostate: trace to 1+ enlarged, non-tender and not boggy. Neurologic: Grossly normal        ASSESSMENT and PLAN    ICD-10-CM ICD-9-CM    1. Essential hypertension, benign  O65 104.5 METABOLIC PANEL, COMPREHENSIVE      METABOLIC PANEL, COMPREHENSIVE   2. Hyperlipidemia LDL goal <130  E78.5 272.4 LIPID PANEL      METABOLIC PANEL, COMPREHENSIVE      METABOLIC PANEL, COMPREHENSIVE      LIPID PANEL   3. Vitamin D deficiency  E55.9 268.9 VITAMIN D, 25 HYDROXY      VITAMIN D, 25 HYDROXY   4. Chronic prostatitis  N41.1 601.1 URINALYSIS W/ REFLEX CULTURE      URINALYSIS W/ REFLEX CULTURE      levoFLOXacin (LEVAQUIN) 500 mg tablet   5. History of gout  Z87.39 V12.29 URIC ACID      URIC ACID     Diagnoses and all orders for this visit:    1. Essential hypertension, benign  -     METABOLIC PANEL, COMPREHENSIVE; Future    2. Hyperlipidemia LDL goal <130  -     LIPID PANEL; Future  -     METABOLIC PANEL, COMPREHENSIVE; Future    3. Vitamin D deficiency  -     VITAMIN D, 25 HYDROXY; Future    4. Chronic prostatitis  -     URINALYSIS W/ REFLEX CULTURE; Future  -     levoFLOXacin (LEVAQUIN) 500 mg tablet; Take 1 Tab by mouth daily. 5. History of gout  -     URIC ACID;  Future              lab results and schedule of future lab studies reviewed with patient  reviewed diet, exercise and weight control  cardiovascular risk and specific lipid/LDL goals reviewed  reviewed medications and side effects in detail  Please call my office if there are any questions- [phone]. I will arrange for follow up after the lab tests done from today return  Recommended a weekly \"heart check. \" I went into detail how to do this. Call for refills on medications as needed. Discussed expected course/resolution/complications of diagnosis in detail with patient. Medication risks/benefits/costs/interactions/alternatives discussed with patient. Pt was given an after visit summary which includes diagnoses, current medications & vitals. Pt expressed understanding with the diagnosis and plan    Total 25 minutes,60 % counseling re: Recommended a weekly \"heart check. \" I went into detail how to do this. Regular exercise is very important to your health; it helps mentally, physically, socially; it prevents injuries if done properly. Exercise, even as simple as walking 20-30 minutes daily has major benefits to your health even though your \"numbers\" are the same in the lab. See if you can add this into your daily regimen and after a few months it will become a regular habit-\"just something you do,\" like brushing your teeth. A combination of aerobic exercise and strengthening and stretching is felt to be the best for you, so this should be your ultimate goal.   This can be done in the privacy of your home or in a group setting as at the gym  Some prefer having a , others prefer to do exercise in groups or individually. Do what \"works\" for you. You need to make it simple and \"fun,\" or you most likely will not continue it. Reviewed symptoms, or lack thereof, of hypertension and elevated cholesterol.      Review of  the proper technique of checking the blood pressure- check it on an average day only, not on a stressful day, sitting, no exercise for at least 1 hour and not experiencing any new pain( chronic pain is OK). Patient encouraged to check BP sitting and standing at least once a month and to report these readings to me if > 140/ 90 on average , or if the standing BP is >  15 points lower than the sitting. Always check it twice and if there is > 5 points decrease from the previous reading( top reading or systolic) keep checking it until it does not drop 5 points. Write only this final reading down, not the preceding readings. If out of these readings there is only 1 out of 4 or less > 501, or > 90 diastolic then your blood pressure is OK; it needs further treatment if it is above this. Also, don't forget,  as noted above, to check your blood pressure standing once a month; this is to detect a drop in your BP that might lead to fainting and serious injury; you check it standing with your arm hanging straight down and relaxed. Check it twice waiting 1 minute between the two readings. If, with either one of these 2 readings there is a > 15 point drop of the systolic compared to your sitting pressure( done before the standing BP), then let me know. Following these guidelines, continue to check your BP and write down only the ones described above and it will help me to effectively treat your blood pressure. Also, discussed symptoms of concern that were noted today in the note above, treatment options( including doing nothing), when to follow up before recommended time frame. Also, answered all questions. I encouraged pt to continue taking his vitamin D lifelong. He states that he has been taking it very regularly for the past 6 months. We will keep him on Levaquin for one month and keep him on it unless the urinalysis shows something.       This document was written by Dieter Lindsey, as dictated by Lidia Cruz MD.  I have reviewed and agree with the above note and have made corrections where appropriate Max Moore M.D. This is the Subsequent Medicare Annual Wellness Exam, performed 12 months or more after the Initial AWV or the last Subsequent AWV    I have reviewed the patient's medical history in detail and updated the computerized patient record. Depression Risk Factor Screening:     3 most recent PHQ Screens 1/15/2021   Little interest or pleasure in doing things Not at all   Feeling down, depressed, irritable, or hopeless Not at all   Total Score PHQ 2 0       Alcohol Risk Screen    Do you average more than 1 drink per night or more than 7 drinks a week: No    In the past three months have you have had more than 4 drinks containing alcohol on one occasion: No        Functional Ability and Level of Safety:    Hearing: Hearing is good. Activities of Daily Living: The home contains: no safety equipment. Patient does total self care      Ambulation: with no difficulty     Fall Risk:  Fall Risk Assessment, last 12 mths 1/15/2021   Able to walk? Yes   Fall in past 12 months? 0   Do you feel unsteady? 0   Are you worried about falling 0      Abuse Screen:  Patient is not abused       Cognitive Screening    Has your family/caregiver stated any concerns about your memory: no     Cognitive Screening: Normal - Mini Cog Test   I reviewed advanced directive information and written information given to patient; patient to make follow up appointment with a NN to review choices for health care, agent, and anatomical gifts. Assessment/Plan   Education and counseling provided:  Are appropriate based on today's review and evaluation    Diagnoses and all orders for this visit:    1. Essential hypertension, benign  -     METABOLIC PANEL, COMPREHENSIVE; Future    2. Hyperlipidemia LDL goal <130  -     LIPID PANEL; Future  -     METABOLIC PANEL, COMPREHENSIVE; Future    3. Vitamin D deficiency  -     VITAMIN D, 25 HYDROXY; Future    4.  Chronic prostatitis  -     URINALYSIS W/ REFLEX CULTURE; Future  - levoFLOXacin (LEVAQUIN) 500 mg tablet; Take 1 Tab by mouth daily. 5. History of gout  -     URIC ACID; Future        Health Maintenance Due     Health Maintenance Due   Topic Date Due    Shingrix Vaccine Age 49> (1 of 2) 09/16/2004    GLAUCOMA SCREENING Q2Y  09/16/2019    Pneumococcal 65+ years (1 of 1 - PPSV23) 09/16/2019    Medicare Yearly Exam  11/12/2019       Patient Care Team   Patient Care Team:  Jaspreet Henry MD as PCP - General (Family Medicine)  Jaspreet Henry MD as PCP - [facility] Empaneled Provider    History     Patient Active Problem List   Diagnosis Code    History of basal cell carcinoma Z85.828    Essential hypertension, benign I10    Hyperlipidemia LDL goal <130 E78.5    Vitamin D deficiency E55.9    Acute idiopathic gout involving toe of left foot M10.072    Cystitis N30.90     Past Medical History:   Diagnosis Date    Acute idiopathic gout involving toe of left foot 9/18/2018    Essential hypertension, benign 3/15/2015    Gout 09/26/2018    Skin cancer     2 BCC's    Sun-damaged skin     Vitamin D deficiency 2/11/2018      Past Surgical History:   Procedure Laterality Date    HX COLONOSCOPY  ? age 47    overdue-family h/o    HX HERNIA REPAIR      HX TONSILLECTOMY       Current Outpatient Medications   Medication Sig Dispense Refill    cholecalciferol (Vitamin D3) (1000 Units /25 mcg) tablet Take 1,000 Units by mouth daily.  levoFLOXacin (LEVAQUIN) 500 mg tablet Take 1 Tab by mouth daily. 30 Tab 0    amLODIPine-benazepril (LOTREL) 5-10 mg per capsule TAKE 1 CAPSULE BY MOUTH EVERY DAY 90 Cap 1    bisoprolol-hydroCHLOROthiazide (ZIAC) 5-6.25 mg per tablet TAKE 1 TABLET BY MOUTH EVERY DAY 90 Tab 1    allopurinoL (ZYLOPRIM) 300 mg tablet Take 1 Tab by mouth daily. Indications: treatment to prevent acute gout attack 90 Tab 3    rosuvastatin (CRESTOR) 5 mg tablet Take 1 Tab by mouth nightly.  90 Tab 1     No Known Allergies    Family History   Problem Relation Age of Onset    Heart Disease Mother     MS Father     Heart Disease Father     No Known Problems Sister      Social History     Tobacco Use    Smoking status: Never Smoker    Smokeless tobacco: Never Used   Substance Use Topics    Alcohol use:  Yes     Alcohol/week: 11.7 standard drinks     Types: 14 Standard drinks or equivalent per week     Comment: occasionally

## 2021-01-16 LAB
25(OH)D3 SERPL-MCNC: 66.2 NG/ML (ref 30–100)
ALBUMIN SERPL-MCNC: 4.4 G/DL (ref 3.5–5)
ALBUMIN/GLOB SERPL: 1.6 {RATIO} (ref 1.1–2.2)
ALP SERPL-CCNC: 113 U/L (ref 45–117)
ALT SERPL-CCNC: 44 U/L (ref 12–78)
ANION GAP SERPL CALC-SCNC: 5 MMOL/L (ref 5–15)
APPEARANCE UR: CLEAR
AST SERPL-CCNC: 37 U/L (ref 15–37)
BACTERIA URNS QL MICRO: NEGATIVE /HPF
BILIRUB SERPL-MCNC: 0.5 MG/DL (ref 0.2–1)
BILIRUB UR QL: NEGATIVE
BUN SERPL-MCNC: 20 MG/DL (ref 6–20)
BUN/CREAT SERPL: 16 (ref 12–20)
CALCIUM SERPL-MCNC: 9.3 MG/DL (ref 8.5–10.1)
CHLORIDE SERPL-SCNC: 102 MMOL/L (ref 97–108)
CHOLEST SERPL-MCNC: 212 MG/DL
CO2 SERPL-SCNC: 29 MMOL/L (ref 21–32)
COLOR UR: NORMAL
CREAT SERPL-MCNC: 1.29 MG/DL (ref 0.7–1.3)
EPITH CASTS URNS QL MICRO: NORMAL /LPF
GLOBULIN SER CALC-MCNC: 2.7 G/DL (ref 2–4)
GLUCOSE SERPL-MCNC: 100 MG/DL (ref 65–100)
GLUCOSE UR STRIP.AUTO-MCNC: NEGATIVE MG/DL
HDLC SERPL-MCNC: 65 MG/DL
HDLC SERPL: 3.3 {RATIO} (ref 0–5)
HGB UR QL STRIP: NEGATIVE
HYALINE CASTS URNS QL MICRO: NORMAL /LPF (ref 0–5)
KETONES UR QL STRIP.AUTO: NEGATIVE MG/DL
LDLC SERPL CALC-MCNC: 119.4 MG/DL (ref 0–100)
LEUKOCYTE ESTERASE UR QL STRIP.AUTO: NEGATIVE
LIPID PROFILE,FLP: ABNORMAL
NITRITE UR QL STRIP.AUTO: NEGATIVE
PH UR STRIP: 7 [PH] (ref 5–8)
POTASSIUM SERPL-SCNC: 4 MMOL/L (ref 3.5–5.1)
PROT SERPL-MCNC: 7.1 G/DL (ref 6.4–8.2)
PROT UR STRIP-MCNC: NEGATIVE MG/DL
RBC #/AREA URNS HPF: NORMAL /HPF (ref 0–5)
SODIUM SERPL-SCNC: 136 MMOL/L (ref 136–145)
SP GR UR REFRACTOMETRY: 1.02 (ref 1–1.03)
TRIGL SERPL-MCNC: 138 MG/DL (ref ?–150)
UA: UC IF INDICATED,UAUC: NORMAL
URATE SERPL-MCNC: 2.4 MG/DL (ref 3.5–7.2)
UROBILINOGEN UR QL STRIP.AUTO: 0.2 EU/DL (ref 0.2–1)
VLDLC SERPL CALC-MCNC: 27.6 MG/DL
WBC URNS QL MICRO: NORMAL /HPF (ref 0–4)

## 2021-01-16 NOTE — PATIENT INSTRUCTIONS
Medicare Wellness Visit, Male 
 
The best way to live healthy is to have a lifestyle where you eat a well-balanced diet, exercise regularly, limit alcohol use, and quit all forms of tobacco/nicotine, if applicable.  
 
Regular preventive services are another way to keep healthy. Preventive services (vaccines, screening tests, monitoring & exams) can help personalize your care plan, which helps you manage your own care. Screening tests can find health problems at the earliest stages, when they are easiest to treat.  
[facility] follows the current, evidence-based guidelines published by the [place] Preventive Services Task Force (USPSTF) when recommending preventive services for our patients. Because we follow these guidelines, sometimes recommendations change over time as research supports it. (For example, a prostate screening blood test is no longer routinely recommended for men with no symptoms).  Of course, you and your doctor may decide to screen more often for some diseases, based on your risk and co-morbidities (chronic disease you are already diagnosed with).  
 
Preventive services for you include: 
- Medicare offers their members a free annual wellness visit, which is time for you and your primary care provider to discuss and plan for your preventive service needs. Take advantage of this benefit every year! 
-All adults over age 65 should receive the recommended pneumonia vaccines. Current USPSTF guidelines recommend a series of two vaccines for the best pneumonia protection.  
-All adults should have a flu vaccine yearly and tetanus vaccine every 10 years. 
-All adults age 50 and older should receive the shingles vaccines (series of two vaccines).      
-All adults age 40-70 who are overweight should have a diabetes screening test once every three years.  
 -Other screening tests & preventive services for persons with diabetes include: an eye exam to screen for diabetic retinopathy, a kidney function test, a foot exam, and stricter control over your cholesterol.  
-Cardiovascular screening for adults with routine risk involves an electrocardiogram (ECG) at intervals determined by the provider.  
-Colorectal cancer screening should be done for adults age 54-65 with no increased risk factors for colorectal cancer. There are a number of acceptable methods of screening for this type of cancer. Each test has its own benefits and drawbacks. Discuss with your provider what is most appropriate for you during your annual wellness visit. The different tests include: colonoscopy (considered the best screening method), a fecal occult blood test, a fecal DNA test, and sigmoidoscopy. 
-All adults born between [facility] should be screened once for Hepatitis C. 
-An Abdominal Aortic Aneurysm (AAA) Screening is recommended for men age 73-68 who has ever smoked in their lifetime. Here is a list of your current Health Maintenance items (your personalized list of preventive services) with a due date: 
Health Maintenance Due Topic Date Due  Shingles Vaccine (1 of 2) 09/16/2004  Glaucoma Screening   09/16/2019  Pneumococcal Vaccine (1 of 1 - PPSV23) 09/16/2019

## 2021-01-17 DIAGNOSIS — E55.9 VITAMIN D DEFICIENCY: Primary | ICD-10-CM

## 2021-01-17 DIAGNOSIS — E78.5 HYPERLIPIDEMIA LDL GOAL <130: ICD-10-CM

## 2021-01-17 RX ORDER — ROSUVASTATIN CALCIUM 10 MG/1
10 TABLET, COATED ORAL
Qty: 90 TAB | Refills: 1 | Status: SHIPPED | OUTPATIENT
Start: 2021-01-17 | End: 2021-07-23

## 2021-01-17 RX ORDER — CHOLECALCIFEROL TAB 125 MCG (5000 UNIT) 125 MCG
5000 TAB ORAL DAILY
COMMUNITY
Start: 2021-01-17

## 2021-01-17 NOTE — PROGRESS NOTES
GREAT NEWS!! The urine test is completely normal- no blood or white cells( sign of active infection). All of your other recent lab tests are normal or at goal.  No diabetes, normal liver, Vitamin D, and kidney tests. The uric acid( what causes gout) is very low so let's keep the allopurinol the same dose( remain on this life long). The cholesterol numbers are OK but with new evidence that higher doses of rosuvastatin are more protective against heart disease and there is minimal risk to increasing the dose,I would increase it to 10 mg and schedule your next fasting office visit in 6 months. Also, I want to get a PSA test before you are off the Levaquin; you need to make a lab only appointment( non fasting so any time of day is OK) the week of Feb 8th. You can finish up the rosuvastatin you have, just take 2 a day and then fill new Rx I will send in of the 10 mg dose; eventually , we will increase it to 40 mg, the maximum dose.

## 2021-02-10 ENCOUNTER — LAB ONLY (OUTPATIENT)
Dept: FAMILY MEDICINE CLINIC | Age: 66
End: 2021-02-10

## 2021-02-10 DIAGNOSIS — N41.1 CHRONIC PROSTATITIS: ICD-10-CM

## 2021-02-10 DIAGNOSIS — N41.0 ACUTE ON CHRONIC PROSTATITIS: ICD-10-CM

## 2021-02-10 DIAGNOSIS — N40.1 NOCTURIA ASSOCIATED WITH BENIGN PROSTATIC HYPERPLASIA: ICD-10-CM

## 2021-02-10 DIAGNOSIS — R35.1 NOCTURIA ASSOCIATED WITH BENIGN PROSTATIC HYPERPLASIA: ICD-10-CM

## 2021-02-10 DIAGNOSIS — N41.1 ACUTE ON CHRONIC PROSTATITIS: ICD-10-CM

## 2021-02-11 LAB
APPEARANCE UR: CLEAR
BACTERIA URNS QL MICRO: NEGATIVE /HPF
BILIRUB UR QL: NEGATIVE
COLOR UR: ABNORMAL
EPITH CASTS URNS QL MICRO: ABNORMAL /LPF
GLUCOSE UR STRIP.AUTO-MCNC: NEGATIVE MG/DL
HGB UR QL STRIP: ABNORMAL
HYALINE CASTS URNS QL MICRO: ABNORMAL /LPF (ref 0–5)
KETONES UR QL STRIP.AUTO: NEGATIVE MG/DL
LEUKOCYTE ESTERASE UR QL STRIP.AUTO: NEGATIVE
NITRITE UR QL STRIP.AUTO: NEGATIVE
PH UR STRIP: 7.5 [PH] (ref 5–8)
PROT UR STRIP-MCNC: NEGATIVE MG/DL
RBC #/AREA URNS HPF: ABNORMAL /HPF (ref 0–5)
SP GR UR REFRACTOMETRY: 1.01 (ref 1–1.03)
UA: UC IF INDICATED,UAUC: ABNORMAL
UROBILINOGEN UR QL STRIP.AUTO: 1 EU/DL (ref 0.2–1)
WBC URNS QL MICRO: ABNORMAL /HPF (ref 0–4)

## 2021-02-12 LAB
% FREE PSA, 480797: 18.6 %
PSA SERPL-MCNC: 5.1 NG/ML (ref 0–4)
PSA, FREE, 480853: 0.95 NG/ML
REFLEX CRITERIA: ABNORMAL

## 2021-04-13 DIAGNOSIS — E78.5 HYPERLIPIDEMIA LDL GOAL <130: ICD-10-CM

## 2021-04-13 RX ORDER — ROSUVASTATIN CALCIUM 10 MG/1
10 TABLET, COATED ORAL
Qty: 90 TAB | Refills: 1 | Status: CANCELLED | OUTPATIENT
Start: 2021-04-13

## 2021-04-13 NOTE — TELEPHONE ENCOUNTER
Dr. Moni Loyola,          [address] [facility] is requesting a lower cost alternative to the Crestor 10 mg (generic). The Crestor has a $24 copay through pt's insurance plan. Suggested alternatives are:    Generic:   - Lipitor 0 copay cost,   - Pravachol 0 copay cost,   - Zocor 0 copay cost.     Please review and prescribe a suggested alternative. Requested Prescriptions     Pending Prescriptions Disp Refills    rosuvastatin (CRESTOR) 10 mg tablet 90 Tab 1     Sig: Take 1 Tab by mouth nightly.  Indications: primary prevention of heart attack

## 2021-06-08 DIAGNOSIS — I10 ESSENTIAL HYPERTENSION, BENIGN: ICD-10-CM

## 2021-06-08 RX ORDER — BISOPROLOL FUMARATE AND HYDROCHLOROTHIAZIDE 5; 6.25 MG/1; MG/1
TABLET ORAL
Qty: 90 TABLET | Refills: 1 | Status: SHIPPED | OUTPATIENT
Start: 2021-06-08 | End: 2021-12-06

## 2021-06-23 DIAGNOSIS — I10 ESSENTIAL HYPERTENSION, BENIGN: ICD-10-CM

## 2021-06-23 RX ORDER — AMLODIPINE AND BENAZEPRIL HYDROCHLORIDE 5; 10 MG/1; MG/1
CAPSULE ORAL
Qty: 90 CAPSULE | Refills: 1 | Status: SHIPPED | OUTPATIENT
Start: 2021-06-23 | End: 2021-12-29

## 2021-07-23 DIAGNOSIS — E78.5 HYPERLIPIDEMIA LDL GOAL <130: ICD-10-CM

## 2021-07-23 RX ORDER — ROSUVASTATIN CALCIUM 10 MG/1
10 TABLET, COATED ORAL
Qty: 90 TABLET | Refills: 1 | Status: SHIPPED | OUTPATIENT
Start: 2021-07-23 | End: 2022-08-05

## 2021-08-19 ENCOUNTER — HOSPITAL ENCOUNTER (EMERGENCY)
Age: 66
Discharge: HOME | End: 2021-08-19
Attending: EMERGENCY MEDICINE | Admitting: EMERGENCY MEDICINE
Payer: MEDICARE

## 2021-08-19 ENCOUNTER — APPOINTMENT (OUTPATIENT)
Dept: CT IMAGING | Age: 66
End: 2021-08-19
Attending: PHYSICIAN ASSISTANT
Payer: MEDICARE

## 2021-08-19 ENCOUNTER — APPOINTMENT (OUTPATIENT)
Dept: GENERAL RADIOLOGY | Age: 66
End: 2021-08-19
Attending: PHYSICIAN ASSISTANT
Payer: MEDICARE

## 2021-08-19 VITALS
OXYGEN SATURATION: 99 % | SYSTOLIC BLOOD PRESSURE: 129 MMHG | DIASTOLIC BLOOD PRESSURE: 84 MMHG | RESPIRATION RATE: 16 BRPM | TEMPERATURE: 98.3 F | HEART RATE: 78 BPM
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
## 2021-08-19 DIAGNOSIS — S02.32XB OPEN FRACTURE OF LEFT ORBITAL FLOOR, INITIAL ENCOUNTER (HCC): Primary | ICD-10-CM

## 2021-08-19 LAB
ALBUMIN SERPL-MCNC: 4.1 G/DL (ref 3.5–5)
ALBUMIN/GLOB SERPL: 1.1 {RATIO} (ref 1.1–2.2)
ALP SERPL-CCNC: 127 U/L (ref 45–117)
ALT SERPL-CCNC: 90 U/L (ref 12–78)
ANION GAP SERPL CALC-SCNC: 5 MMOL/L (ref 5–15)
APPEARANCE UR: CLEAR
AST SERPL-CCNC: 93 U/L (ref 15–37)
ATRIAL RATE: 73 BPM
BACTERIA URNS QL MICRO: NEGATIVE /HPF
BASOPHILS # BLD: 0.1 K/UL (ref 0–0.1)
BASOPHILS NFR BLD: 1 % (ref 0–1)
BILIRUB SERPL-MCNC: 0.6 MG/DL (ref 0.2–1)
BILIRUB UR QL: NEGATIVE
BUN SERPL-MCNC: 14 MG/DL (ref 6–20)
BUN/CREAT SERPL: 12 (ref 12–20)
CALCIUM SERPL-MCNC: 9.2 MG/DL (ref 8.5–10.1)
CALCULATED P AXIS, ECG09: 78 DEGREES
CALCULATED R AXIS, ECG10: 66 DEGREES
CALCULATED T AXIS, ECG11: 52 DEGREES
CHLORIDE SERPL-SCNC: 102 MMOL/L (ref 97–108)
CO2 SERPL-SCNC: 29 MMOL/L (ref 21–32)
COLOR UR: NORMAL
CREAT SERPL-MCNC: 1.18 MG/DL (ref 0.7–1.3)
DIAGNOSIS, 93000: NORMAL
DIFFERENTIAL METHOD BLD: ABNORMAL
EOSINOPHIL # BLD: 0.1 K/UL (ref 0–0.4)
EOSINOPHIL NFR BLD: 2 % (ref 0–7)
EPITH CASTS URNS QL MICRO: NORMAL /LPF
ERYTHROCYTE [DISTWIDTH] IN BLOOD BY AUTOMATED COUNT: 12.1 % (ref 11.5–14.5)
GLOBULIN SER CALC-MCNC: 3.6 G/DL (ref 2–4)
GLUCOSE SERPL-MCNC: 111 MG/DL (ref 65–100)
GLUCOSE UR STRIP.AUTO-MCNC: NEGATIVE MG/DL
HCT VFR BLD AUTO: 46.6 % (ref 36.6–50.3)
HGB BLD-MCNC: 15.9 G/DL (ref 12.1–17)
HGB UR QL STRIP: NEGATIVE
HYALINE CASTS URNS QL MICRO: NORMAL /LPF (ref 0–5)
IMM GRANULOCYTES # BLD AUTO: 0 K/UL (ref 0–0.04)
IMM GRANULOCYTES NFR BLD AUTO: 1 % (ref 0–0.5)
KETONES UR QL STRIP.AUTO: NEGATIVE MG/DL
LEUKOCYTE ESTERASE UR QL STRIP.AUTO: NEGATIVE
LYMPHOCYTES # BLD: 1.6 K/UL (ref 0.8–3.5)
LYMPHOCYTES NFR BLD: 27 % (ref 12–49)
MCH RBC QN AUTO: 31.7 PG (ref 26–34)
MCHC RBC AUTO-ENTMCNC: 34.1 G/DL (ref 30–36.5)
MCV RBC AUTO: 93 FL (ref 80–99)
MONOCYTES # BLD: 0.4 K/UL (ref 0–1)
MONOCYTES NFR BLD: 7 % (ref 5–13)
NEUTS SEG # BLD: 3.8 K/UL (ref 1.8–8)
NEUTS SEG NFR BLD: 62 % (ref 32–75)
NITRITE UR QL STRIP.AUTO: NEGATIVE
NRBC # BLD: 0 K/UL (ref 0–0.01)
NRBC BLD-RTO: 0 PER 100 WBC
P-R INTERVAL, ECG05: 146 MS
PH UR STRIP: 6 [PH] (ref 5–8)
PLATELET # BLD AUTO: 197 K/UL (ref 150–400)
PMV BLD AUTO: 9.5 FL (ref 8.9–12.9)
POTASSIUM SERPL-SCNC: 4.5 MMOL/L (ref 3.5–5.1)
PROT SERPL-MCNC: 7.7 G/DL (ref 6.4–8.2)
PROT UR STRIP-MCNC: NEGATIVE MG/DL
Q-T INTERVAL, ECG07: 380 MS
QRS DURATION, ECG06: 90 MS
QTC CALCULATION (BEZET), ECG08: 418 MS
RBC # BLD AUTO: 5.01 M/UL (ref 4.1–5.7)
RBC #/AREA URNS HPF: NORMAL /HPF (ref 0–5)
SODIUM SERPL-SCNC: 136 MMOL/L (ref 136–145)
SP GR UR REFRACTOMETRY: 1.01 (ref 1–1.03)
TROPONIN I SERPL-MCNC: <0.05 NG/ML
UR CULT HOLD, URHOLD: NORMAL
UROBILINOGEN UR QL STRIP.AUTO: 0.2 EU/DL (ref 0.2–1)
VENTRICULAR RATE, ECG03: 73 BPM
WBC # BLD AUTO: 6.1 K/UL (ref 4.1–11.1)
WBC URNS QL MICRO: NORMAL /HPF (ref 0–4)

## 2021-08-19 PROCEDURE — 74011250636 HC RX REV CODE- 250/636: Performed by: PHYSICIAN ASSISTANT

## 2021-08-19 PROCEDURE — 70450 CT HEAD/BRAIN W/O DYE: CPT

## 2021-08-19 PROCEDURE — 70486 CT MAXILLOFACIAL W/O DYE: CPT

## 2021-08-19 PROCEDURE — 36415 COLL VENOUS BLD VENIPUNCTURE: CPT

## 2021-08-19 PROCEDURE — 84484 ASSAY OF TROPONIN QUANT: CPT

## 2021-08-19 PROCEDURE — 90471 IMMUNIZATION ADMIN: CPT

## 2021-08-19 PROCEDURE — 81001 URINALYSIS AUTO W/SCOPE: CPT

## 2021-08-19 PROCEDURE — 74011250637 HC RX REV CODE- 250/637: Performed by: PHYSICIAN ASSISTANT

## 2021-08-19 PROCEDURE — 80053 COMPREHEN METABOLIC PANEL: CPT

## 2021-08-19 PROCEDURE — 75810000293 HC SIMP/SUPERF WND  RPR

## 2021-08-19 PROCEDURE — 72125 CT NECK SPINE W/O DYE: CPT

## 2021-08-19 PROCEDURE — 90715 TDAP VACCINE 7 YRS/> IM: CPT | Performed by: PHYSICIAN ASSISTANT

## 2021-08-19 PROCEDURE — 93005 ELECTROCARDIOGRAM TRACING: CPT

## 2021-08-19 PROCEDURE — 85025 COMPLETE CBC W/AUTO DIFF WBC: CPT

## 2021-08-19 PROCEDURE — 73630 X-RAY EXAM OF FOOT: CPT

## 2021-08-19 PROCEDURE — 73610 X-RAY EXAM OF ANKLE: CPT

## 2021-08-19 PROCEDURE — 99284 EMERGENCY DEPT VISIT MOD MDM: CPT

## 2021-08-19 RX ORDER — CEPHALEXIN 500 MG/1
500 CAPSULE ORAL 2 TIMES DAILY
Qty: 20 CAPSULE | Refills: 0 | Status: SHIPPED | OUTPATIENT
Start: 2021-08-19 | End: 2021-08-29

## 2021-08-19 RX ORDER — ACETAMINOPHEN 325 MG/1
650 TABLET ORAL
Status: COMPLETED | OUTPATIENT
Start: 2021-08-19 | End: 2021-08-19

## 2021-08-19 RX ADMIN — TETANUS TOXOID, REDUCED DIPHTHERIA TOXOID AND ACELLULAR PERTUSSIS VACCINE, ADSORBED 0.5 ML: 5; 2.5; 8; 8; 2.5 SUSPENSION INTRAMUSCULAR at 12:21

## 2021-08-19 RX ADMIN — ACETAMINOPHEN 650 MG: 325 TABLET ORAL at 12:21

## 2021-08-19 NOTE — ED TRIAGE NOTES
Pt stated he fell down about 3-4 steps , thinks he was half asleep and hit his left eye on the railing, denies loc, denies neck pain, no vision problems , pt with laceration noted below left eye, no active bleeding noted at this bryan

## 2021-08-19 NOTE — ED PROVIDER NOTES
Adilene Muñoz is a 76 y.o. male with PMhx of skin CA, HTN, gout, who presents to ED with cc of fall this morning and now notes laceration below left eye, pain and swelling to that region. States he went outside at 0600 to move his car then came back inside and went back to sleep. States around 7/8 he thought someone knocked on his door, so he went to go answer it and states he \"missed the last 3 steps\", causing him to fall. States the L side of his face hit the banister on the way down. Denies LOC, or neck pain. Denies visual changes. Notes bleeding has been controlled prior to arrival.  Denies epistaxis. Pt notes he is compliant with his blood pressure medication and has taken it today. Denies taking tylenol or motrin for pain. Pt denies additional symptoms of F/C, cough, congestion, CP, SOB, urinary or fecal changes. Last tetanus > 5 years. PMHx: Reviewed, as below. PSHx: Reviewed, as below. PCP: Narda Potter MD    There are no other complaints verbalized at this time.                Past Medical History:   Diagnosis Date    Acute idiopathic gout involving toe of left foot 9/18/2018    Essential hypertension, benign 3/15/2015    Gout 09/26/2018    Skin cancer     2 BCC's    Sun-damaged skin     Vitamin D deficiency 2/11/2018       Past Surgical History:   Procedure Laterality Date    HX COLONOSCOPY  ? age 47    overdue-family h/o    HX HERNIA REPAIR      HX TONSILLECTOMY           Family History:   Problem Relation Age of Onset    Heart Disease Mother     MS Father     Heart Disease Father     No Known Problems Sister        Social History     Socioeconomic History    Marital status: SINGLE     Spouse name: Not on file    Number of children: Not on file    Years of education: Not on file    Highest education level: Not on file   Occupational History    Not on file   Tobacco Use    Smoking status: Never Smoker    Smokeless tobacco: Never Used   Vaping Use    Vaping Use: Never used   Substance and Sexual Activity    Alcohol use: Yes     Alcohol/week: 11.7 standard drinks     Types: 14 Standard drinks or equivalent per week     Comment: occasionally    Drug use: Not on file    Sexual activity: Yes     Partners: Female   Other Topics Concern    Not on file   Social History Narrative    Not on file     Social Determinants of Health     Financial Resource Strain:     Difficulty of Paying Living Expenses:    Food Insecurity:     Worried About Running Out of Food in the Last Year:     [address] in the Last Year:    Transportation Needs:     Lack of Transportation (Medical):  Lack of Transportation (Non-Medical):    Physical Activity:     Days of Exercise per Week:     Minutes of Exercise per Session:    Stress:     Feeling of Stress :    Social Connections:     Frequency of Communication with Friends and Family:     Frequency of Social Gatherings with Friends and Family:     Attends [employer] Services:     Active Member of Clubs or Organizations:     Attends Club or Organization Meetings:     Marital Status:    Intimate Partner Violence:     Fear of Current or Ex-Partner:     Emotionally Abused:     Physically Abused:     Sexually Abused: ALLERGIES: Patient has no known allergies. Review of Systems   Constitutional: Negative for chills and fever. HENT: Positive for facial swelling. Negative for congestion and nosebleeds. Eyes: Negative for visual disturbance. Respiratory: Negative for cough and shortness of breath. Cardiovascular: Negative for chest pain. Gastrointestinal: Negative for abdominal pain, constipation, diarrhea, nausea and vomiting. Genitourinary: Negative for dysuria and frequency. Musculoskeletal: Negative for neck pain. Skin: Positive for wound. Neurological: Negative for syncope. All other systems reviewed and are negative.       Vitals:    08/19/21 0929 08/19/21 1355   BP: 131/72 129/84   Pulse: 77 78 Resp: 16 16   Temp: 98.2 °F (36.8 °C) 98.3 °F (36.8 °C)   SpO2: 98% 99%            Physical Exam  Vitals and nursing note reviewed. Constitutional:       Appearance: He is not diaphoretic. HENT:      Head: No raccoon eyes or Forrester's sign. Jaw: There is normal jaw occlusion. Comments: No CSF otorrhea or rhinorrhea. No periocular bruising on R, no occipital bruising. Approximately 2-1/2 cm linear, shallow laceration with no gaping noted inferior to left lower eyelid. No active bleeding. No foreign body noted. Bruising noted inferiorly left orbit. Tenderness noted inferiorly to left orbit. No further facial tenderness to palpation or tenderness to palpation throughout rest of head. Right Ear: Tympanic membrane, ear canal and external ear normal. No drainage. No hemotympanum. Left Ear: Tympanic membrane, ear canal and external ear normal. No drainage. No hemotympanum. Nose: Nose normal.      Right Nostril: No epistaxis or septal hematoma. Left Nostril: No epistaxis or septal hematoma. Mouth/Throat:      Mouth: Mucous membranes are moist.      Pharynx: Oropharynx is clear. Uvula midline. Eyes:      Extraocular Movements: Extraocular movements intact. Pupils: Pupils are equal, round, and reactive to light. Comments: Patient notes some pain to left eye with lateral upward and downward gaze. Denies visual changes or diplopia. No hyphema noted. Some hemorrhage noted to left lateral sclera. Vision grossly intact. Cardiovascular:      Rate and Rhythm: Normal rate and regular rhythm. Heart sounds: Normal heart sounds. No murmur heard. No friction rub. No gallop. Pulmonary:      Effort: No respiratory distress. Breath sounds: No stridor. No wheezing, rhonchi or rales. Abdominal:      General: Bowel sounds are normal. There is no distension. Palpations: Abdomen is soft. Tenderness: There is no abdominal tenderness.    Musculoskeletal: General: No swelling or deformity. Cervical back: Normal range of motion. No rigidity. No spinous process tenderness or muscular tenderness. Thoracic back: No tenderness or bony tenderness. Lumbar back: No tenderness or bony tenderness. Skin:     General: Skin is warm and dry. Capillary Refill: Capillary refill takes less than 2 seconds. Neurological:      Mental Status: He is alert and oriented to person, place, and time. GCS: GCS eye subscore is 4. GCS verbal subscore is 5. GCS motor subscore is 6. Cranial Nerves: Cranial nerves are intact. No cranial nerve deficit (2-12), dysarthria or facial asymmetry. Sensory: Sensation is intact. No sensory deficit (grossly to BLUE and BLLE). Motor: Motor function is intact. No weakness (5/5 strength to biceps, triceps,  strength, to flexion/extension knees and ankles), abnormal muscle tone or seizure activity. Coordination: Finger-Nose-Finger Test and Heel to [employer] normal.      Gait: Gait normal.          MDM  Number of Diagnoses or Management Options     Amount and/or Complexity of Data Reviewed  Clinical lab tests: ordered and reviewed  Tests in the radiology section of CPT®: ordered and reviewed  Tests in the medicine section of CPT®: ordered and reviewed  Discuss the patient with other providers: yes (Dr. Alvarado Malik, ED attending  Dr. Rafael Bush, ENT)      ED Course as of Aug 20 1403   Thu Aug 19, 2021   1032 ED EKG interpretation:  Rhythm: normal sinus rhythm. Rate (approx.): 73. Axis: normal.  ST segment:  No concerning ST elevations or depressions. This EKG was interpreted by Promise Beckett MD,ED Provider. EKG, 12 LEAD, INITIAL [JM]      ED Course User Index  [JM] Rom Alvares MD       Procedures      I have updated patient on the results of we have obtained thus far. Notes bruising and swelling to his right foot. Has tenderness to superior lateral aspect. Ambulatory with even gait.   Has slight tenderness to lateral malleolus. Has no tenderness to palpation noted to knee, anterior tib/fib syndesmosis, calf. Has F AROM to knee, ankle. 2+ dorsalis pedis pulse. Capillary refill less than 2 seconds. Sensation intact throughout leg. Will order x-ray foot and ankle. CONSULT NOTE:   1:00 PM  Erica Herron PA-C spoke with Dr Leticia Rasheed,   Specialty: ENT  Discussed pt's hx, disposition, and available diagnostic and imaging results. Reviewed care plans. He recommends patient follow-up within the next 5 days with plan for surgery within the next 10 days. Recommends measuring IOP's in as long as patient has no evidence of visual distortion he is appropriate for discharge with outpatient follow-up. Endorses recommendation for course of Keflex prophylactically. I have discussed this recommendation with patient who verbalizes understanding and agreement with care plan. Procedure Note - Laceration Repair:  1:20 PM  Procedure by Erica Herron PA-C  Complexity: simple   2.5 cm curvilinear, shallow laceration to face  was irrigated copiously with NS under jet lavage, prepped with Shur-Clens and draped in a sterile fashion. The wound was explored with the following results: No foreign bodies found. The wound was repaired with Dermabond. The wound was closed with good hemostasis and approximation. Estimated blood loss: minimal  The procedure took 1-15 minutes, and pt tolerated well. Procedure Note - Ari-pen Exam:  1:35 PM   Performed by Erica Herron PA-C:  Pt's intraocular pressure in his L eye was checked using a ari-pen. Prior to procedure ari-pen was calibrated and reset for accurate measurement. Pressure was 20 and 21 mmHg in his L eye. The procedure took 1-15 minutes, and pt tolerated well.           VITAL SIGNS:  Vitals:    08/19/21 0929 08/19/21 1355   BP: 131/72 129/84   Pulse: 77 78   Resp: 16 16   Temp: 98.2 °F (36.8 °C) 98.3 °F (36.8 °C)   SpO2: 98% 99% LABS:  No results found for this or any previous visit (from the past 24 hour(s)). IMAGING:  XR FOOT RT MIN 3 V   Final Result   No acute fracture or dislocation. XR ANKLE RT MIN 3 V   Final Result   No acute fracture or dislocation. CT MAXILLOFACIAL WO CONT   Final Result   Depressed left orbital floor fracture with large bony defect. Herniation of   orbital fat and inferior displacement of the left inferior muscle group into the   defect. CT HEAD WO CONT   Final Result   No acute intracranial abnormalities. CT SPINE CERV WO CONT   Final Result   1. No acute cervical fracture or subluxation. 2. Left Inferior orbital floor fracture as described above. Medications During Visit:  Medications   diph,Pertuss(AC),Tet Vac-PF (BOOSTRIX) suspension 0.5 mL (0.5 mL IntraMUSCular Given 8/19/21 1221)   acetaminophen (TYLENOL) tablet 650 mg (650 mg Oral Given 8/19/21 1221)         DECISION MAKING:    Qing Wilkerson is a 76 y.o. male who comes in as above. Presents after missing the last 3 steps and falling, striking the left side of his face on the way down. Patient denies symptoms causing his fall such as chest pain, shortness of breath, dizziness, other. Reports he thinks he was half asleep at that time. UA, CBC, CMP, troponin, EKG obtained without acute etiology. Patient unsure last tetanus, tetanus has been updated in ED. Pain has been treated with dose of Tylenol. X-ray foot and ankle obtained without acute fracture or dislocation we have discussed short abortive treatments with ice, elevation, rest, compression. CT cervical spine, head and maxillofacial obtained. Noted finding of depressed left orbital floor fracture with large bony defect, herniation of orbital fat and inferior displacement of the left inferior muscle group into the defect noted. Patient denies visual changes, denies diplopia.   EOM intact however patient notes some pain with lateral superior and inferior gaze. I have discussed with ENT. IOP 20 and 21 mm Hg. Will place on course of Keflex and have follow-up with options as below within 5 days as instructed. I have discussed care with ED attending. Pt and I have discussed close return precautions as well as follow up recommendations. Opportunity for questions presented. Pt verbalizes their understanding and agreement with care plan. IMPRESSION:  1. Open fracture of left orbital floor, initial encounter ([facility] [address]    2. Fall, initial encounter        DISPOSITION:  Discharged      Discharge Medication List as of 8/19/2021  1:39 PM      START taking these medications    Details   cephALEXin (Keflex) 500 mg capsule Take 1 Capsule by mouth two (2) times a day for 10 days. , Normal, Disp-20 Capsule, R-0         CONTINUE these medications which have NOT CHANGED    Details   rosuvastatin (CRESTOR) 10 mg tablet TAKE 1 TAB BY MOUTH NIGHTLY. INDICATIONS: PRIMARY PREVENTION OF HEART ATTACK, Normal, Disp-90 Tablet, R-1      amLODIPine-benazepril (LOTREL) 5-10 mg per capsule TAKE 1 CAPSULE BY MOUTH EVERY DAY, Normal, Disp-90 Capsule, R-1      bisoprolol-hydroCHLOROthiazide (ZIAC) 5-6.25 mg per tablet TAKE 1 TABLET BY MOUTH EVERY DAY, Normal, Disp-90 Tablet, R-1      cholecalciferol (Vitamin D3) (5000 Units/125 mcg) tab tablet Take 1 Tab by mouth daily. , OTC      levoFLOXacin (LEVAQUIN) 500 mg tablet Take 1 Tab by mouth daily. , Normal, Disp-30 Tab, R-0      allopurinoL (ZYLOPRIM) 300 mg tablet Take 1 Tab by mouth daily.  Indications: treatment to prevent acute gout attack, Normal, Disp-90 Tab, R-3Please let pt know we are changing to the 300 mg pill              Follow-up Information     Follow up With Specialties Details Why Contact Cuate Hayes MD Otolaryngology Schedule an appointment as soon as possible for a visit   [address] [place] [address]      Stevo Aranda MD Otolaryngology Schedule an appointment as soon as possible for a visit   [address]      Sonido Rocha MD Ophthalmology Schedule an appointment as soon as possible for a visit   [address] [address]  [phone]      [address] [facility] Go to  As needed, If symptoms worsen, if you develop vision changes to your eye, new or other concerning symptoms [address] [facility]  [phone]            The patient is asked to follow-up with their primary care provider and any other physicians as above in the next several days. They are to call tomorrow for an appointment. We have discussed strict return precautions and the patient is asked to return promptly for any increased concerns or worsening of symptoms and for return precautions regarding their symptoms today. They can return to this emergency department or any other emergency department. I have discussed with them results as above and presented opportunity for questions. They verbalize their understanding of the aboveand agreement with care plan. Please note that this dictation was completed with [id number], the [employer] voice recognition software. Quite often unanticipated grammatical, syntax, homophones, and other interpretive errors are inadvertently transcribed by the computer software. Please disregard these errors. Please excuse any errors that have escaped final proofreading.

## 2021-09-13 ENCOUNTER — PATIENT MESSAGE (OUTPATIENT)
Dept: FAMILY MEDICINE CLINIC | Age: 66
End: 2021-09-13

## 2021-12-03 DIAGNOSIS — I10 ESSENTIAL HYPERTENSION, BENIGN: ICD-10-CM

## 2021-12-05 DIAGNOSIS — Z87.39 HISTORY OF GOUT: ICD-10-CM

## 2021-12-06 RX ORDER — ALLOPURINOL 300 MG/1
TABLET ORAL
Qty: 90 TABLET | Refills: 3 | Status: SHIPPED | OUTPATIENT
Start: 2021-12-06

## 2021-12-06 RX ORDER — BISOPROLOL FUMARATE AND HYDROCHLOROTHIAZIDE 5; 6.25 MG/1; MG/1
TABLET ORAL
Qty: 90 TABLET | Refills: 1 | Status: SHIPPED | OUTPATIENT
Start: 2021-12-06 | End: 2022-06-01

## 2021-12-14 ENCOUNTER — OFFICE VISIT (OUTPATIENT)
Dept: FAMILY MEDICINE CLINIC | Age: 66
End: 2021-12-14
Payer: MEDICARE

## 2021-12-14 VITALS
WEIGHT: 176 LBS | BODY MASS INDEX: 25.2 KG/M2 | TEMPERATURE: 97.8 F | HEART RATE: 78 BPM | HEIGHT: 70 IN | OXYGEN SATURATION: 100 % | SYSTOLIC BLOOD PRESSURE: 122 MMHG | DIASTOLIC BLOOD PRESSURE: 78 MMHG | RESPIRATION RATE: 16 BRPM

## 2021-12-14 DIAGNOSIS — E55.9 VITAMIN D DEFICIENCY: ICD-10-CM

## 2021-12-14 DIAGNOSIS — E78.5 HYPERLIPIDEMIA LDL GOAL <130: Primary | ICD-10-CM

## 2021-12-14 DIAGNOSIS — Z00.00 MEDICARE ANNUAL WELLNESS VISIT, SUBSEQUENT: ICD-10-CM

## 2021-12-14 DIAGNOSIS — Z71.89 ACP (ADVANCE CARE PLANNING): ICD-10-CM

## 2021-12-14 DIAGNOSIS — I10 ESSENTIAL HYPERTENSION, BENIGN: ICD-10-CM

## 2021-12-14 DIAGNOSIS — S02.85XA ORBITAL FRACTURE, CLOSED, INITIAL ENCOUNTER (HCC): ICD-10-CM

## 2021-12-14 DIAGNOSIS — Z87.39 HISTORY OF GOUT: ICD-10-CM

## 2021-12-14 DIAGNOSIS — N41.1 CHRONIC PROSTATITIS: ICD-10-CM

## 2021-12-14 PROCEDURE — G8536 NO DOC ELDER MAL SCRN: HCPCS | Performed by: FAMILY MEDICINE

## 2021-12-14 PROCEDURE — 1101F PT FALLS ASSESS-DOCD LE1/YR: CPT | Performed by: FAMILY MEDICINE

## 2021-12-14 PROCEDURE — G8752 SYS BP LESS 140: HCPCS | Performed by: FAMILY MEDICINE

## 2021-12-14 PROCEDURE — G8419 CALC BMI OUT NRM PARAM NOF/U: HCPCS | Performed by: FAMILY MEDICINE

## 2021-12-14 PROCEDURE — G8754 DIAS BP LESS 90: HCPCS | Performed by: FAMILY MEDICINE

## 2021-12-14 PROCEDURE — G8510 SCR DEP NEG, NO PLAN REQD: HCPCS | Performed by: FAMILY MEDICINE

## 2021-12-14 PROCEDURE — G8427 DOCREV CUR MEDS BY ELIG CLIN: HCPCS | Performed by: FAMILY MEDICINE

## 2021-12-14 PROCEDURE — G0463 HOSPITAL OUTPT CLINIC VISIT: HCPCS | Performed by: FAMILY MEDICINE

## 2021-12-14 PROCEDURE — 3017F COLORECTAL CA SCREEN DOC REV: CPT | Performed by: FAMILY MEDICINE

## 2021-12-14 PROCEDURE — 99215 OFFICE O/P EST HI 40 MIN: CPT | Performed by: FAMILY MEDICINE

## 2021-12-14 NOTE — PROGRESS NOTES
HISTORY OF PRESENT ILLNESS  HPI  Enrique Antoine is a 78 y.o. Male with a history of HTN, BCC, gout, vitamin D deficiency and hyperlipidemia with LDL goal <130, who presents at the office today c/o fall and for f/u of these health problems. On the morning of 08/19, pt woke up from sleep thinking he heard knocking at his door. As he went to answer the door, he \"missed 3 steps\" and fell. The left side of his face hit the banister on the way down. This caused a laceration inferior to the left lower eyelid and bruising inferior to the left orbit. Workup determined the fall caused a fracture of left orbital floor. Pt had the laceration repaired and a discharged with advice for at-home care for fracture and referral to ENT specialist.    Pt notes that he had some blurred vision of his left eye following his fall. He notes that when he looks at a bright source of color that color leaves an impression causing him to see that color over his surroundings. Pt states his ophthalmologist retina okay but the left orbital is still fractured. Pt adds that he finds night driving to be difficult. There were no detachments of the retina or micro bleedings found. Pt will see ophthalmologist for f/u on 01/19/2022. Pt asks about his deviated septum. When he first noticed this, he had a large amount of congestion. He notes that he passed a large amount of material from his left nostril about 3 weeks to a month ago that he describes as a \"chunk of meat. \". He says that his sister has noticed he does not sound as congested since passing the material. Pt had some bloody nose but this seemed to resolve with use of humidifier. Pt mentions that he has some persistent runny nose. He states that he uses tissue for this frequently. Pt has never had runny nose to this severity before his injury. Pt was not given any medicine from ENT for this but pt is taking Claritin for allergic rhinitis and it does not help the rhinorrhea.  He has noticed there is a hot sensation in his left nostril. Pt states when asked about prior nose injuries, his nose was struck in the past over 40 yrs ago while playing basketball and it was very painful for a week or so, but he never had it X rayed or saw a doctor for it. .     Pt denies unusual SOB, chest pain, and any recent ER visits or hospitalizations. Past Medical History:   Diagnosis Date    Acute idiopathic gout involving toe of left foot 9/18/2018    Essential hypertension, benign 3/15/2015    Gout 09/26/2018    Skin cancer     2 BCC's    Sun-damaged skin     Vitamin D deficiency 2/11/2018     Past Surgical History:   Procedure Laterality Date    HX COLONOSCOPY  ? age 47    overdue-family h/o    HX HERNIA REPAIR      HX TONSILLECTOMY       Current Outpatient Medications on File Prior to Visit   Medication Sig Dispense Refill    bisoprolol-hydroCHLOROthiazide (ZIAC) 5-6.25 mg per tablet TAKE 1 TABLET BY MOUTH EVERY DAY 90 Tablet 1    allopurinoL (ZYLOPRIM) 300 mg tablet **DOSE INCREASE**TAKE 1 TAB BY MOUTH DAILY. INDICATIONS: TREATMENT TO PREVENT ACUTE GOUT ATTACK 90 Tablet 3    rosuvastatin (CRESTOR) 10 mg tablet TAKE 1 TAB BY MOUTH NIGHTLY. INDICATIONS: PRIMARY PREVENTION OF HEART ATTACK 90 Tablet 1    amLODIPine-benazepril (LOTREL) 5-10 mg per capsule TAKE 1 CAPSULE BY MOUTH EVERY DAY 90 Capsule 1    cholecalciferol (Vitamin D3) (5000 Units/125 mcg) tab tablet Take 1 Tab by mouth daily. No current facility-administered medications on file prior to visit. No Known Allergies  Family History   Problem Relation Age of Onset    Heart Disease Mother     Mult Sclerosis Father     Heart Disease Father     No Known Problems Sister      Social History     Socioeconomic History    Marital status: SINGLE   Tobacco Use    Smoking status: Never Smoker    Smokeless tobacco: Never Used   Vaping Use    Vaping Use: Never used   Substance and Sexual Activity    Alcohol use:  Yes Alcohol/week: 11.7 standard drinks     Types: 14 Standard drinks or equivalent per week     Comment: occasionally    Sexual activity: Yes     Partners: Female           Review of Systems   Constitutional: Negative for chills, diaphoresis, fever, malaise/fatigue and weight loss. Eyes: Negative for blurred vision, double vision, pain and redness. Respiratory: Negative for cough, shortness of breath and wheezing. Cardiovascular: Negative for chest pain, palpitations, orthopnea, claudication, leg swelling and PND. Skin: Negative for itching and rash. Neurological: Negative for dizziness, tingling, tremors, sensory change, speech change, focal weakness, seizures, loss of consciousness, weakness and headaches. Results for orders placed or performed during the hospital encounter of 08/19/21   URINE CULTURE HOLD SAMPLE    Specimen: Serum; Urine   Result Value Ref Range    Urine culture hold        Urine on hold in Microbiology dept for 2 days. If unpreserved urine is submitted, it cannot be used for addtional testing after 24 hours, recollection will be required. CBC WITH AUTOMATED DIFF   Result Value Ref Range    WBC 6.1 4.1 - 11.1 K/uL    RBC 5.01 4.10 - 5.70 M/uL    HGB 15.9 12.1 - 17.0 g/dL    HCT 46.6 36.6 - 50.3 %    MCV 93.0 80.0 - 99.0 FL    MCH 31.7 26.0 - 34.0 PG    MCHC 34.1 30.0 - 36.5 g/dL    RDW 12.1 11.5 - 14.5 %    PLATELET 240 779 - 575 K/uL    MPV 9.5 8.9 - 12.9 FL    NRBC 0.0 0  WBC    ABSOLUTE NRBC 0.00 0.00 - 0.01 K/uL    NEUTROPHILS 62 32 - 75 %    LYMPHOCYTES 27 12 - 49 %    MONOCYTES 7 5 - 13 %    EOSINOPHILS 2 0 - 7 %    BASOPHILS 1 0 - 1 %    IMMATURE GRANULOCYTES 1 (H) 0.0 - 0.5 %    ABS. NEUTROPHILS 3.8 1.8 - 8.0 K/UL    ABS. LYMPHOCYTES 1.6 0.8 - 3.5 K/UL    ABS. MONOCYTES 0.4 0.0 - 1.0 K/UL    ABS. EOSINOPHILS 0.1 0.0 - 0.4 K/UL    ABS. BASOPHILS 0.1 0.0 - 0.1 K/UL    ABS. IMM.  GRANS. 0.0 0.00 - 0.04 K/UL    DF AUTOMATED     METABOLIC PANEL, COMPREHENSIVE   Result Value Ref Range    Sodium 136 136 - 145 mmol/L    Potassium 4.5 3.5 - 5.1 mmol/L    Chloride 102 97 - 108 mmol/L    CO2 29 21 - 32 mmol/L    Anion gap 5 5 - 15 mmol/L    Glucose 111 (H) 65 - 100 mg/dL    BUN 14 6 - 20 MG/DL    Creatinine 1.18 0.70 - 1.30 MG/DL    BUN/Creatinine ratio 12 12 - 20      GFR est AA >60 >60 ml/min/1.73m2    GFR est non-AA >60 >60 ml/min/1.73m2    Calcium 9.2 8.5 - 10.1 MG/DL    Bilirubin, total 0.6 0.2 - 1.0 MG/DL    ALT (SGPT) 90 (H) 12 - 78 U/L    AST (SGOT) 93 (H) 15 - 37 U/L    Alk.  phosphatase 127 (H) 45 - 117 U/L    Protein, total 7.7 6.4 - 8.2 g/dL    Albumin 4.1 3.5 - 5.0 g/dL    Globulin 3.6 2.0 - 4.0 g/dL    A-G Ratio 1.1 1.1 - 2.2     TROPONIN I   Result Value Ref Range    Troponin-I, Qt. <0.05 <0.05 ng/mL   URINALYSIS W/MICROSCOPIC   Result Value Ref Range    Color YELLOW/STRAW      Appearance CLEAR CLEAR      Specific gravity 1.015 1.003 - 1.030      pH (UA) 6.0 5.0 - 8.0      Protein Negative NEG mg/dL    Glucose Negative NEG mg/dL    Ketone Negative NEG mg/dL    Bilirubin Negative NEG      Blood Negative NEG      Urobilinogen 0.2 0.2 - 1.0 EU/dL    Nitrites Negative NEG      Leukocyte Esterase Negative NEG      WBC 0-4 0 - 4 /hpf    RBC 0-5 0 - 5 /hpf    Epithelial cells FEW FEW /lpf    Bacteria Negative NEG /hpf    Hyaline cast 0-2 0 - 5 /lpf   EKG, 12 LEAD, INITIAL   Result Value Ref Range    Ventricular Rate 73 BPM    Atrial Rate 73 BPM    P-R Interval 146 ms    QRS Duration 90 ms    Q-T Interval 380 ms    QTC Calculation (Bezet) 418 ms    Calculated P Axis 78 degrees    Calculated R Axis 66 degrees    Calculated T Axis 52 degrees    Diagnosis       Normal sinus rhythm  Normal ECG  No previous ECGs available  Confirmed by Kristyn Valdivia MD, [facility] ([id number]) on 8/19/2021 12:54:38 PM               Physical Exam  Visit Vitals  /78 (BP 1 Location: Left arm, BP Patient Position: Sitting, BP Cuff Size: Adult)   Pulse 78   Temp 97.8 °F (36.6 °C)   Resp 16   Ht 5' 10\" (1.778 m)   Wt 176 lb (79.8 kg)   SpO2 100%   BMI 25.25 kg/m²         Head: Normocephalic, without obvious abnormality, atraumatic  Eyes: conjunctivae/corneas clear. PERRL, EOM's intact. Ears: normal TM's and external ear canals AU  Nose: Nares normal. Septum midline. Mucosa normal. No drainage or sinus tenderness. Slight deviated septum. He continues to have moderate congestion in general on both sides of his nose. There appears to be some increased erythema compared to baseline on both side but there are no signs of active bleeding, pus, or active infection  Neck: supple, symmetrical, trachea midline, no adenopathy, thyroid: not enlarged, symmetric, no tenderness/mass/nodules, no carotid bruit and no JVD  Lungs: clear to auscultation bilaterally  Heart: regular rate and rhythm, S1, S2 normal, no murmur, click, rub or gallop  Extremities: extremities normal, atraumatic, no cyanosis or edema  Pulses: 2+ and symmetric  Lymph nodes: Cervical, supraclavicular, and axillary nodes normal.  Neurologic: Grossly normal        ASSESSMENT and PLAN    ICD-10-CM ICD-9-CM    1. Hyperlipidemia LDL goal <130  E78.5 272.4 LIPID PANEL      METABOLIC PANEL, COMPREHENSIVE   2. Vitamin D deficiency  E55.9 268.9    3. ACP (advance care planning)  Z71.89 V65.49    4. Medicare annual wellness visit, subsequent  Z00.00 V70.0    5. Essential hypertension, benign  I10 401.1    6. History of gout  Z87.39 V12.29    7. Chronic prostatitis  N41.1 601.1    8. [facility]  S02.85XA 802.8      Diagnoses and all orders for this visit:    1. Hyperlipidemia LDL goal <130  -     LIPID PANEL; Future  -     METABOLIC PANEL, COMPREHENSIVE; Future    2. Vitamin D deficiency    3. ACP (advance care planning)    4. Medicare annual wellness visit, subsequent    5. Essential hypertension, benign    6. History of gout    7. Chronic prostatitis    8.  Orbital fracture, closed, initial encounter [facility])            lab results and schedule of future lab studies reviewed with patient  reviewed diet, exercise and weight control  cardiovascular risk and specific lipid/LDL goals reviewed  reviewed medications and side effects in detail  Please call my office if there are any questions- [phone]. I will arrange for follow up after the lab tests done from today return  Recommended a weekly \"heart check. \" I went into detail how to do this. Call for refills on medications as needed. Discussed expected course/resolution/complications of diagnosis in detail with patient. Medication risks/benefits/costs/interactions/alternatives discussed with patient. Pt was given an after visit summary which includes diagnoses, current medications & vitals. Pt expressed understanding with the diagnosis and plan      BMI is significantly elevated- in the overweight range. I reviewed diet, exercise and weight control. Discussed weight control in detail, the importance of mainly decreased carbs, and for weight maintenance, exercise; discussed different diets and that it isn't as important to watch the type of foods as it is to decrease calorie intake no matter what type of diet you do, etc.     Total 50 minutes  Re: that overall he has post injury symptoms of a mild concussion, nasal congestion and visual changes, and that overall , things seem to be improving. Sometimes the improvement will take months to heal without any significant treatment from the doctor, just allowing things to heal. I told him he is in that stage and the most important thing tonot miss at this point are any visual changes that could be a sign of a retinal detachment, etc.      Review of  the proper technique of checking the blood pressure- check it on an average day only, not on a stressful day, sitting, no exercise for at least 1 hour and not experiencing any new pain( chronic pain is OK).  Patient encouraged to check BP sitting and standing at least once a month and to report these readings to me if > 140/ 90 on average , or if the standing BP is >  15 points lower than the sitting. Always check it twice and if there is > 5 points decrease from the previous reading( top reading or systolic) keep checking it until it does not drop 5 points. Write only this final reading down, not the preceding readings. If out of these readings there is only 1 out of 4 or less > 803, or > 90 diastolic then your blood pressure is OK; it needs further treatment if it is above this. Also, don't forget,  as noted above, to check your blood pressure standing once a month; this is to detect a drop in your BP that might lead to fainting and serious injury; you check it standing with your arm hanging straight down and relaxed. Check it twice waiting 1 minute between the two readings. If, with either one of these 2 readings there is a > 15 point drop of the systolic compared to your sitting pressure( done before the standing BP), then let me know. Following these guidelines, continue to check your BP and write down only the ones described above and it will help me to effectively treat your blood pressure. Reviewed symptoms, or lack thereof, of hypertension and elevated cholesterol. Regular exercise is very important to your health; it helps mentally, physically, socially; it prevents injuries if done properly. Exercise, even as simple as walking 20-30 minutes daily has major benefits to your health even though your \"numbers\" are the same in the lab. See if you can add this into your daily regimen and after a few months it will become a regular habit-\"just something you do,\" like brushing your teeth. A combination of aerobic exercise and strengthening and stretching is felt to be the best for you, so this should be your ultimate goal.   This can be done in the privacy of your home or in a group setting as at the gym  Some prefer having a , others prefer to do exercise in groups or individually.   Do what \"works\" for you. You need to make it simple and \"fun,\" or you most likely will not continue it. Recommended a weekly \"heart check. \" I went into detail how to do this. Also, discussed symptoms of concern that were noted today in the note above, treatment options( including doing nothing), when to follow up before recommended time frame. Also, answered all questions. Because he is not fasting, he will come back another day to do his lab work. He told me he did not start rosuvastatin yet, basically because he was concerned about it, his injuries, and antibiotics he was taking for bladder infection, so he agreed to start taking the rosuvastatin now. For that reason, we will wait till mid January to do his lab work. When he was in the ER in August, he had an elevated ALT and AST and also mild elevation of the ALK-Phos. This could be from his bone fracture or some of it could be coming from the alcohol that he drinks- a couple a night on average, but sometimes more. He agreed to keep his drinking to a minimum so it would not affect any of the lab results we get. He needs to also use the saline nose spray but use the mist and not the squirt gun type spray and use this 4 times a day. He should call back in a week or so to let me know if his nose continues to improve or worsen, etc. We might give trial of nasal steroid at that point to help the nose further. For the runny nose, this might work as well. I told him that an option for his runny nose would be to switch from Claritin to either Zyrtec or Allegra, trying each one for no more than 2-3 days. If no different on those, we can switch back to the Claritin. Continue follow up with the ophthalmologist. Because the nasal congestion is improving, he can postpone ENT follow up right now if he feels comfortable with that.     This document was written by Jose Bishop, as dictated by Aleida Anderson MD.  I have reviewed and agree with the above note and have made corrections where appropriate Max Ward M.D.

## 2021-12-14 NOTE — PROGRESS NOTES
Chief Complaint   Patient presents with    Hypertension    Cholesterol Problem    Fall     fell on steps injured face saw [facility],    1. \"Have you been to the ER, urgent care clinic since your last visit? Hospitalized since your last visit? \" Yes for fall    2. \"Have you seen or consulted any other health care providers outside of the [address] [place] [address] since your last visit? \" Yes Dr Casandra Edmondson, ENT and [facility]      3. For patients over 45: Has the patient had a colonoscopy? Yes,  satisfied with blue hyperlink     If the patient is female:    4. For patients over 36: Has the patient had a mammogram? NA based on age or sex    11. For patients over 21: Has the patient had a pap smear?  NA based on age or sex

## 2021-12-29 DIAGNOSIS — I10 ESSENTIAL HYPERTENSION, BENIGN: ICD-10-CM

## 2021-12-29 RX ORDER — AMLODIPINE AND BENAZEPRIL HYDROCHLORIDE 5; 10 MG/1; MG/1
CAPSULE ORAL
Qty: 90 CAPSULE | Refills: 1 | Status: SHIPPED | OUTPATIENT
Start: 2021-12-29 | End: 2022-06-22

## 2022-03-08 ENCOUNTER — APPOINTMENT (OUTPATIENT)
Dept: FAMILY MEDICINE CLINIC | Age: 67
End: 2022-03-08

## 2022-03-08 DIAGNOSIS — E78.5 HYPERLIPIDEMIA LDL GOAL <130: ICD-10-CM

## 2022-03-08 LAB
ALBUMIN SERPL-MCNC: 3.9 G/DL (ref 3.5–5)
ALBUMIN/GLOB SERPL: 1.4 {RATIO} (ref 1.1–2.2)
ALP SERPL-CCNC: 92 U/L (ref 45–117)
ALT SERPL-CCNC: 69 U/L (ref 12–78)
ANION GAP SERPL CALC-SCNC: 7 MMOL/L (ref 5–15)
AST SERPL-CCNC: 55 U/L (ref 15–37)
BILIRUB SERPL-MCNC: 1.2 MG/DL (ref 0.2–1)
BUN SERPL-MCNC: 20 MG/DL (ref 6–20)
BUN/CREAT SERPL: 17 (ref 12–20)
CALCIUM SERPL-MCNC: 9.2 MG/DL (ref 8.5–10.1)
CHLORIDE SERPL-SCNC: 98 MMOL/L (ref 97–108)
CHOLEST SERPL-MCNC: 149 MG/DL
CO2 SERPL-SCNC: 27 MMOL/L (ref 21–32)
CREAT SERPL-MCNC: 1.2 MG/DL (ref 0.7–1.3)
GLOBULIN SER CALC-MCNC: 2.7 G/DL (ref 2–4)
GLUCOSE SERPL-MCNC: 119 MG/DL (ref 65–100)
HDLC SERPL-MCNC: 76 MG/DL
HDLC SERPL: 2 {RATIO} (ref 0–5)
LDLC SERPL CALC-MCNC: 50.4 MG/DL (ref 0–100)
POTASSIUM SERPL-SCNC: 4.2 MMOL/L (ref 3.5–5.1)
PROT SERPL-MCNC: 6.6 G/DL (ref 6.4–8.2)
SODIUM SERPL-SCNC: 132 MMOL/L (ref 136–145)
TRIGL SERPL-MCNC: 113 MG/DL (ref ?–150)
VLDLC SERPL CALC-MCNC: 22.6 MG/DL

## 2022-03-19 PROBLEM — M10.072 ACUTE IDIOPATHIC GOUT INVOLVING TOE OF LEFT FOOT: Status: ACTIVE | Noted: 2018-09-18

## 2022-03-19 PROBLEM — E55.9 VITAMIN D DEFICIENCY: Status: ACTIVE | Noted: 2018-02-11

## 2022-03-19 PROBLEM — N30.90 CYSTITIS: Status: ACTIVE | Noted: 2020-09-10

## 2022-06-01 DIAGNOSIS — I10 ESSENTIAL HYPERTENSION, BENIGN: ICD-10-CM

## 2022-06-01 RX ORDER — BISOPROLOL FUMARATE AND HYDROCHLOROTHIAZIDE 5; 6.25 MG/1; MG/1
TABLET ORAL
Qty: 90 TABLET | Refills: 1 | Status: SHIPPED | OUTPATIENT
Start: 2022-06-01

## 2022-06-21 DIAGNOSIS — I10 ESSENTIAL HYPERTENSION, BENIGN: ICD-10-CM

## 2022-06-22 RX ORDER — AMLODIPINE AND BENAZEPRIL HYDROCHLORIDE 5; 10 MG/1; MG/1
CAPSULE ORAL
Qty: 90 CAPSULE | Refills: 1 | Status: SHIPPED | OUTPATIENT
Start: 2022-06-22

## 2022-06-22 NOTE — TELEPHONE ENCOUNTER
Requested Prescriptions     Pending Prescriptions Disp Refills    amLODIPine-benazepril (LOTREL) 5-10 mg per capsule [Pharmacy Med Name: AMLODIPINE-BENAZEPRIL 5-10 MG] 90 Capsule 1     Sig: TAKE 1 CAPSULE BY MOUTH EVERY DAY       Next visit 7/12/22

## 2022-07-12 ENCOUNTER — OFFICE VISIT (OUTPATIENT)
Dept: FAMILY MEDICINE CLINIC | Age: 67
End: 2022-07-12
Payer: MEDICARE

## 2022-07-12 VITALS
RESPIRATION RATE: 16 BRPM | BODY MASS INDEX: 23.62 KG/M2 | SYSTOLIC BLOOD PRESSURE: 112 MMHG | TEMPERATURE: 97.5 F | WEIGHT: 165 LBS | DIASTOLIC BLOOD PRESSURE: 76 MMHG | HEART RATE: 70 BPM | HEIGHT: 70 IN | OXYGEN SATURATION: 98 %

## 2022-07-12 DIAGNOSIS — Z71.89 ACP (ADVANCE CARE PLANNING): ICD-10-CM

## 2022-07-12 DIAGNOSIS — N40.1 NOCTURIA ASSOCIATED WITH BENIGN PROSTATIC HYPERPLASIA: ICD-10-CM

## 2022-07-12 DIAGNOSIS — R35.1 NOCTURIA ASSOCIATED WITH BENIGN PROSTATIC HYPERPLASIA: ICD-10-CM

## 2022-07-12 DIAGNOSIS — Z87.39 HISTORY OF GOUT: ICD-10-CM

## 2022-07-12 DIAGNOSIS — E55.9 VITAMIN D DEFICIENCY: ICD-10-CM

## 2022-07-12 DIAGNOSIS — E78.5 HYPERLIPIDEMIA LDL GOAL <130: ICD-10-CM

## 2022-07-12 DIAGNOSIS — S02.85XD CLOSED FRACTURE OF ORBIT WITH ROUTINE HEALING, SUBSEQUENT ENCOUNTER: ICD-10-CM

## 2022-07-12 DIAGNOSIS — I10 ESSENTIAL HYPERTENSION, BENIGN: Primary | ICD-10-CM

## 2022-07-12 DIAGNOSIS — Z00.00 MEDICARE ANNUAL WELLNESS VISIT, SUBSEQUENT: ICD-10-CM

## 2022-07-12 PROCEDURE — G8536 NO DOC ELDER MAL SCRN: HCPCS | Performed by: FAMILY MEDICINE

## 2022-07-12 PROCEDURE — G8432 DEP SCR NOT DOC, RNG: HCPCS | Performed by: FAMILY MEDICINE

## 2022-07-12 PROCEDURE — G0439 PPPS, SUBSEQ VISIT: HCPCS | Performed by: FAMILY MEDICINE

## 2022-07-12 PROCEDURE — G8427 DOCREV CUR MEDS BY ELIG CLIN: HCPCS | Performed by: FAMILY MEDICINE

## 2022-07-12 PROCEDURE — G8754 DIAS BP LESS 90: HCPCS | Performed by: FAMILY MEDICINE

## 2022-07-12 PROCEDURE — G0463 HOSPITAL OUTPT CLINIC VISIT: HCPCS | Performed by: FAMILY MEDICINE

## 2022-07-12 PROCEDURE — G8420 CALC BMI NORM PARAMETERS: HCPCS | Performed by: FAMILY MEDICINE

## 2022-07-12 PROCEDURE — 1123F ACP DISCUSS/DSCN MKR DOCD: CPT | Performed by: FAMILY MEDICINE

## 2022-07-12 PROCEDURE — 3017F COLORECTAL CA SCREEN DOC REV: CPT | Performed by: FAMILY MEDICINE

## 2022-07-12 PROCEDURE — 99214 OFFICE O/P EST MOD 30 MIN: CPT | Performed by: FAMILY MEDICINE

## 2022-07-12 PROCEDURE — G8752 SYS BP LESS 140: HCPCS | Performed by: FAMILY MEDICINE

## 2022-07-12 PROCEDURE — 1101F PT FALLS ASSESS-DOCD LE1/YR: CPT | Performed by: FAMILY MEDICINE

## 2022-07-12 RX ORDER — GENTAMICIN SULFATE 1 MG/G
OINTMENT TOPICAL
COMMUNITY
Start: 2022-07-06 | End: 2022-07-12 | Stop reason: ALTCHOICE

## 2022-07-12 RX ORDER — CEPHALEXIN 500 MG/1
CAPSULE ORAL
COMMUNITY
Start: 2022-06-16 | End: 2022-07-12 | Stop reason: ALTCHOICE

## 2022-07-12 RX ORDER — GENTAMICIN SULFATE 1 MG/G
CREAM TOPICAL
COMMUNITY
Start: 2022-04-04

## 2022-07-12 NOTE — PROGRESS NOTES
HISTORY OF PRESENT ILLNESS  HPI  Kimberly Johnson a 31 W. o. male with a history of HTN, BCC, gout, vitamin D deficiency and hyperlipidemia with LDL goal <130, who presents at the office today for f/u of these health problems.     Pt has kept his weight down with dieting. Pt is seeing Dr. Gage Vázquez (dermatology) for his skin cancers. He asks about getting the pneumonia and shingles vaccines. Pt denies unusual SOB, chest pain, and any recent ER visits or hospitalizations. Past Medical History:   Diagnosis Date    Acute idiopathic gout involving toe of left foot 9/18/2018    Essential hypertension, benign 3/15/2015    Gout 09/26/2018    Skin cancer     2 BCC's    Sun-damaged skin     Vitamin D deficiency 2/11/2018     Past Surgical History:   Procedure Laterality Date    HX COLONOSCOPY  ? age 47    overdue-family h/o    HX HERNIA REPAIR      HX TONSILLECTOMY       Current Outpatient Medications on File Prior to Visit   Medication Sig Dispense Refill    gentamicin (GARAMYCIN) 0.1 % topical cream APPLY TO BIOPSY TWICE DAILY TILL HEALED      amLODIPine-benazepril (LOTREL) 5-10 mg per capsule TAKE 1 CAPSULE BY MOUTH EVERY DAY 90 Capsule 1    bisoprolol-hydroCHLOROthiazide (ZIAC) 5-6.25 mg per tablet TAKE 1 TABLET BY MOUTH EVERY DAY 90 Tablet 1    allopurinoL (ZYLOPRIM) 300 mg tablet **DOSE INCREASE**TAKE 1 TAB BY MOUTH DAILY. INDICATIONS: TREATMENT TO PREVENT ACUTE GOUT ATTACK 90 Tablet 3    rosuvastatin (CRESTOR) 10 mg tablet TAKE 1 TAB BY MOUTH NIGHTLY. INDICATIONS: PRIMARY PREVENTION OF HEART ATTACK 90 Tablet 1    cholecalciferol (Vitamin D3) (5000 Units/125 mcg) tab tablet Take 1 Tab by mouth daily. No current facility-administered medications on file prior to visit.      No Known Allergies  Family History   Problem Relation Age of Onset    Heart Disease Mother     Mult Sclerosis Father     Heart Disease Father     No Known Problems Sister      Social History     Socioeconomic History    Marital status: SINGLE   Tobacco Use    Smoking status: Never Smoker    Smokeless tobacco: Never Used   Vaping Use    Vaping Use: Never used   Substance and Sexual Activity    Alcohol use: Yes     Alcohol/week: 11.7 standard drinks     Types: 14 Standard drinks or equivalent per week     Comment: occasionally    Sexual activity: Yes     Partners: Female                 Review of Systems   Constitutional: Negative for chills, diaphoresis, fever, malaise/fatigue and weight loss. Eyes: Negative for blurred vision, double vision, pain and redness. Respiratory: Negative for cough, shortness of breath and wheezing. Cardiovascular: Negative for chest pain, palpitations, orthopnea, claudication, leg swelling and PND. Skin: Negative for itching and rash. Neurological: Negative for dizziness, tingling, tremors, sensory change, speech change, focal weakness, seizures, loss of consciousness, weakness and headaches. Results for orders placed or performed in visit on 61/76/85   METABOLIC PANEL, COMPREHENSIVE   Result Value Ref Range    Sodium 132 (L) 136 - 145 mmol/L    Potassium 4.2 3.5 - 5.1 mmol/L    Chloride 98 97 - 108 mmol/L    CO2 27 21 - 32 mmol/L    Anion gap 7 5 - 15 mmol/L    Glucose 119 (H) 65 - 100 mg/dL    BUN 20 6 - 20 MG/DL    Creatinine 1.20 0.70 - 1.30 MG/DL    BUN/Creatinine ratio 17 12 - 20      GFR est AA >60 >60 ml/min/1.73m2    GFR est non-AA >60 >60 ml/min/1.73m2    Calcium 9.2 8.5 - 10.1 MG/DL    Bilirubin, total 1.2 (H) 0.2 - 1.0 MG/DL    ALT (SGPT) 69 12 - 78 U/L    AST (SGOT) 55 (H) 15 - 37 U/L    Alk.  phosphatase 92 45 - 117 U/L    Protein, total 6.6 6.4 - 8.2 g/dL    Albumin 3.9 3.5 - 5.0 g/dL    Globulin 2.7 2.0 - 4.0 g/dL    A-G Ratio 1.4 1.1 - 2.2     LIPID PANEL   Result Value Ref Range    Cholesterol, total 149 <200 MG/DL    Triglyceride 113 <150 MG/DL    HDL Cholesterol 76 MG/DL    LDL, calculated 50.4 0 - 100 MG/DL    VLDL, calculated 22.6 MG/DL    CHOL/HDL Ratio 2.0 0.0 - 5.0               Physical Exam  Visit Vitals  /76 (BP 1 Location: Right arm, BP Patient Position: Sitting, BP Cuff Size: Adult)   Pulse 70   Temp 97.5 °F (36.4 °C) (Temporal)   Resp 16   Ht 5' 10\" (1.778 m)   Wt 165 lb (74.8 kg)   SpO2 98%   BMI 23.68 kg/m²         Head: Normocephalic, without obvious abnormality, atraumatic  Eyes: conjunctivae/corneas clear. PERRL, EOM's intact. Neck: supple, symmetrical, trachea midline, no adenopathy, thyroid: not enlarged, symmetric, no tenderness/mass/nodules, no carotid bruit and no JVD  Lungs: clear to auscultation bilaterally  Heart: regular rate and rhythm, S1, S2 normal, no murmur, click, rub or gallop  Extremities: extremities normal, atraumatic, no cyanosis or edema  Pulses: 2+ and symmetric  Lymph nodes: Cervical, supraclavicular, and axillary nodes normal.  Neurologic: Grossly normal        ASSESSMENT and PLAN    ICD-10-CM ICD-9-CM    1. Essential hypertension, benign  I10 401.1    2. Hyperlipidemia LDL goal <130  E78.5 272.4    3. Vitamin D deficiency  E55.9 268.9    4. Medicare annual wellness visit, subsequent  Z00.00 V70.0    5. ACP (advance care planning)  Z71.89 V65.49    6. History of gout  Z87.39 V12.29    7. Nocturia associated with benign prostatic hyperplasia  N40.1 600.01     R35.1 788.43    8. Closed fracture of orbit with routine healing, subsequent encounter  S02.85XD V54.19      Diagnoses and all orders for this visit:    1. Essential hypertension, benign    2. Hyperlipidemia LDL goal <130    3. Vitamin D deficiency    4. Medicare annual wellness visit, subsequent    5. ACP (advance care planning)    6. History of gout    7. Nocturia associated with benign prostatic hyperplasia    8. Closed fracture of orbit with routine healing, subsequent encounter      Follow-up and Dispositions    · Return in about 6 months (around 1/12/2023) for F/U HTN and CHOL, F/U gout.          lab results and schedule of future lab studies reviewed with patient  reviewed diet, exercise and weight control  cardiovascular risk and specific lipid/LDL goals reviewed  reviewed medications and side effects in detail  Please call my office if there are any questions- [phone]. I will arrange for follow up after the lab tests done from today return  Recommended a weekly \"heart check. \" I went into detail how to do this. Call for refills on medications as needed. Discussed expected course/resolution/complications of diagnosis in detail with patient. Medication risks/benefits/costs/interactions/alternatives discussed with patient. Pt was given an after visit summary which includes diagnoses, current medications & vitals. Pt expressed understanding with the diagnosis and plan      BMI is significantly elevated- in the overweight range. I reviewed diet, exercise and weight control. Discussed weight control in detail, the importance of mainly decreased carbs, and for weight maintenance, exercise; discussed different diets and that it isn't as important to watch the type of foods as it is to decrease calorie intake no matter what type of diet you do, etc.     Total 30 minutes  re: Recommended a weekly \"heart check. \" I went into detail how to do this. Regular exercise is very important to your health; it helps mentally, physically, socially; it prevents injuries if done properly. Exercise, even as simple as walking 20-30 minutes daily has major benefits to your health even though your \"numbers\" are the same in the lab. See if you can add this into your daily regimen and after a few months it will become a regular habit-\"just something you do,\" like brushing your teeth.      A combination of aerobic exercise and strengthening and stretching is felt to be the best for you, so this should be your ultimate goal.   This can be done in the privacy of your home or in a group setting as at the gym  Some prefer having a , others prefer to do exercise in groups or individually. Do what \"works\" for you. You need to make it simple and \"fun,\" or you most likely will not continue it. Reviewed symptoms, or lack thereof, of hypertension and elevated cholesterol. Review of  the proper technique of checking the blood pressure- check it on an average day only, not on a stressful day, sitting, no exercise for at least 1 hour and not experiencing any new pain( chronic pain is OK). Patient encouraged to check BP sitting and standing at least once a month and to report these readings to me if > 140/ 90 on average , or if the standing BP is >  15 points lower than the sitting. Always check it twice and if there is > 5 points decrease from the previous reading( top reading or systolic) keep checking it until it does not drop 5 points. Write only this final reading down, not the preceding readings. If out of these readings there is only 1 out of 4 or less > 082, or > 90 diastolic then your blood pressure is OK; it needs further treatment if it is above this. Also, don't forget,  as noted above, to check your blood pressure standing once a month; this is to detect a drop in your BP that might lead to fainting and serious injury; you check it standing with your arm hanging straight down and relaxed. Check it twice waiting 1 minute between the two readings. If, with either one of these 2 readings there is a > 15 point drop of the systolic compared to your sitting pressure( done before the standing BP), then let me know. Following these guidelines, continue to check your BP and write down only the ones described above and it will help me to effectively treat your blood pressure. Also, discussed symptoms of concern that were noted today in the note above, treatment options( including doing nothing), when to follow up before recommended time frame. Also, answered all questions. Because he has lab work done back in March, we will hold off on doing that until next visit.  He is behind on his immunizations, so I encouraged him to get his second Moderna shot and the Pneumovax 20 at the local pharmacy as soon as possible. He asked about the shingles vaccine and I told hm that is something I would recommend as well but not make that a priority and maybe get that in a month or two from now. He was drinking alcohol quite heavily a year ago or so but has cut back to occasional alcohol at this point and his LFT's have returned to normal.     This document was written by Whitney Pitts, as dictated by Anastasiya Horn MD.   I have reviewed and agree with the above note and have made corrections where appropriate Max Retana M.D. This is the Subsequent Medicare Annual Wellness Exam, performed 12 months or more after the Initial AWV or the last Subsequent AWV    I have reviewed the patient's medical history in detail and updated the computerized patient record. Assessment/Plan   Education and counseling provided:  Are appropriate based on today's review and evaluation    1. Essential hypertension, benign  2. Hyperlipidemia LDL goal <130  3. Vitamin D deficiency  4. Medicare annual wellness visit, subsequent  5. ACP (advance care planning)  6. History of gout  7. Nocturia associated with benign prostatic hyperplasia  8. Closed fracture of orbit with routine healing, subsequent encounter       Depression Risk Factor Screening     3 most recent PHQ Screens 12/14/2021   Little interest or pleasure in doing things Not at all   Feeling down, depressed, irritable, or hopeless Not at all   Total Score PHQ 2 0       Alcohol & Drug Abuse Risk Screen    Do you average more than 1 drink per night or more than 7 drinks a week: No    In the past three months have you have had more than 4 drinks containing alcohol on one occasion: No          Functional Ability and Level of Safety    Hearing: Hearing is good. Activities of Daily Living: The home contains: no safety equipment.   Patient does total self care      Ambulation: with no difficulty     Fall Risk:  Fall Risk Assessment, last 12 mths 12/14/2021   Able to walk? Yes   Fall in past 12 months? 1   Do you feel unsteady? 0   Are you worried about falling 0   Is TUG test greater than 12 seconds? 0   Is the gait abnormal? 0   Number of falls in past 12 months 1   Fall with injury? 1      Abuse Screen:  Patient is not abused       Cognitive Screening    Has your family/caregiver stated any concerns about your memory: no     Cognitive Screening: Normal - Mini Cog Test  I reviewed advanced directive information and written information given to patient; patient to make follow up appointment with a NN for any questions,to review choices made for health care, agent, and anatomical gifts that are on the advanced directive at home.      Health Maintenance Due     Health Maintenance Due   Topic Date Due    Shingrix Vaccine Age 49> (3 of 2) Never done    Pneumococcal 65+ years (1 - PCV) Never done       Patient Care Team   Patient Care Team:  Remedios Lee MD as PCP - General (Family Medicine)  Remedios Lee MD as PCP - [address] [place] [address] Provider    History     Patient Active Problem List   Diagnosis Code    History of basal cell carcinoma Z85.828    Essential hypertension, benign I10    Hyperlipidemia LDL goal <130 E78.5    Vitamin D deficiency E55.9    Acute idiopathic gout involving toe of left foot M10.072    Cystitis N30.90     Past Medical History:   Diagnosis Date    Acute idiopathic gout involving toe of left foot 9/18/2018    Essential hypertension, benign 3/15/2015    Gout 09/26/2018    Skin cancer     2 BCC's    Sun-damaged skin     Vitamin D deficiency 2/11/2018      Past Surgical History:   Procedure Laterality Date    HX COLONOSCOPY  ? age 47    overdue-family h/o    HX HERNIA REPAIR      HX TONSILLECTOMY       Current Outpatient Medications   Medication Sig Dispense Refill    gentamicin (GARAMYCIN) 0.1 % topical cream APPLY TO BIOPSY TWICE DAILY TILL HEALED      amLODIPine-benazepril (LOTREL) 5-10 mg per capsule TAKE 1 CAPSULE BY MOUTH EVERY DAY 90 Capsule 1    bisoprolol-hydroCHLOROthiazide (ZIAC) 5-6.25 mg per tablet TAKE 1 TABLET BY MOUTH EVERY DAY 90 Tablet 1    allopurinoL (ZYLOPRIM) 300 mg tablet **DOSE INCREASE**TAKE 1 TAB BY MOUTH DAILY. INDICATIONS: TREATMENT TO PREVENT ACUTE GOUT ATTACK 90 Tablet 3    rosuvastatin (CRESTOR) 10 mg tablet TAKE 1 TAB BY MOUTH NIGHTLY. INDICATIONS: PRIMARY PREVENTION OF HEART ATTACK 90 Tablet 1    cholecalciferol (Vitamin D3) (5000 Units/125 mcg) tab tablet Take 1 Tab by mouth daily. No Known Allergies    Family History   Problem Relation Age of Onset    Heart Disease Mother     Mult Sclerosis Father     Heart Disease Father     No Known Problems Sister      Social History     Tobacco Use    Smoking status: Never Smoker    Smokeless tobacco: Never Used   Substance Use Topics    Alcohol use:  Yes     Alcohol/week: 11.7 standard drinks     Types: 14 Standard drinks or equivalent per week     Comment: occasionally         Radha Pineda MD

## 2022-07-17 NOTE — PATIENT INSTRUCTIONS
Medicare Wellness Visit, Male    The best way to live healthy is to have a lifestyle where you eat a well-balanced diet, exercise regularly, limit alcohol use, and quit all forms of tobacco/nicotine, if applicable. Regular preventive services are another way to keep healthy. Preventive services (vaccines, screening tests, monitoring & exams) can help personalize your care plan, which helps you manage your own care. Screening tests can find health problems at the earliest stages, when they are easiest to treat. [facility] follows the current, evidence-based guidelines published by the [facility] ([facility]) when recommending preventive services for our patients. Because we follow these guidelines, sometimes recommendations change over time as research supports it. (For example, a prostate screening blood test is no longer routinely recommended for men with no symptoms). Of course, you and your doctor may decide to screen more often for some diseases, based on your risk and co-morbidities (chronic disease you are already diagnosed with). Preventive services for you include:  - Medicare offers their members a free annual wellness visit, which is time for you and your primary care provider to discuss and plan for your preventive service needs. Take advantage of this benefit every year!  -All adults over age 72 should receive the recommended pneumonia vaccines. Current USPSTF guidelines recommend a series of two vaccines for the best pneumonia protection.   -All adults should have a flu vaccine yearly and tetanus vaccine every 10 years.  -All adults age 48 and older should receive the shingles vaccines (series of two vaccines).        -All adults age 38-68 who are overweight should have a diabetes screening test once every three years.   -Other screening tests & preventive services for persons with diabetes include: an eye exam to screen for diabetic retinopathy, a kidney function test, a foot exam, and stricter control over your cholesterol.   -Cardiovascular screening for adults with routine risk involves an electrocardiogram (ECG) at intervals determined by the provider.   -Colorectal cancer screening should be done for adults age 54-65 with no increased risk factors for colorectal cancer. There are a number of acceptable methods of screening for this type of cancer. Each test has its own benefits and drawbacks. Discuss with your provider what is most appropriate for you during your annual wellness visit. The different tests include: colonoscopy (considered the best screening method), a fecal occult blood test, a fecal DNA test, and sigmoidoscopy.  -All adults born between [facility] should be screened once for Hepatitis C.  -An Abdominal Aortic Aneurysm (AAA) Screening is recommended for men age 73-68 who has ever smoked in their lifetime.      Here is a list of your current Health Maintenance items (your personalized list of preventive services) with a due date:  Health Maintenance Due   Topic Date Due    Shingles Vaccine (1 of 2) Never done    Pneumococcal Vaccine (1 - PCV) Never done

## 2022-12-16 DIAGNOSIS — I10 ESSENTIAL HYPERTENSION, BENIGN: ICD-10-CM

## 2022-12-16 NOTE — TELEPHONE ENCOUNTER
Last Visit: 7/12/22 with MD Sherly Blair  Next Appointment: Chetan Camp to follow-up in 6 months (1/2023)  Previous Refill Encounter(s): 6/22/22 #90 with 1 refill    Requested Prescriptions     Pending Prescriptions Disp Refills    amLODIPine-benazepril (LOTREL) 5-10 mg per capsule 90 Capsule 1     Sig: Take 1 Capsule by mouth daily. For [facility] in place:   Recommendation Provided To:    Intervention Detail: New Rx: 1, reason: Patient Preference  Gap Closed?:   Intervention Accepted By:   Time Spent (min): 5

## 2022-12-17 RX ORDER — AMLODIPINE AND BENAZEPRIL HYDROCHLORIDE 5; 10 MG/1; MG/1
1 CAPSULE ORAL DAILY
Qty: 90 CAPSULE | Refills: 0 | Status: SHIPPED | OUTPATIENT
Start: 2022-12-17

## 2023-02-24 DIAGNOSIS — I10 ESSENTIAL HYPERTENSION, BENIGN: ICD-10-CM

## 2023-02-24 RX ORDER — BISOPROLOL FUMARATE AND HYDROCHLOROTHIAZIDE 5; 6.25 MG/1; MG/1
TABLET ORAL
Qty: 90 TABLET | Refills: 0 | Status: SHIPPED | OUTPATIENT
Start: 2023-02-24

## 2023-02-24 NOTE — TELEPHONE ENCOUNTER
Pt's scheduled to see  on 3/20/23 @ 9:30 AM.Please send in enough of the pt's medication to bridge him to his appt.

## 2023-03-17 DIAGNOSIS — I10 ESSENTIAL HYPERTENSION, BENIGN: ICD-10-CM

## 2023-03-19 RX ORDER — AMLODIPINE AND BENAZEPRIL HYDROCHLORIDE 5; 10 MG/1; MG/1
CAPSULE ORAL
Qty: 90 CAPSULE | Refills: 1 | Status: SHIPPED | OUTPATIENT
Start: 2023-03-19

## 2023-03-20 ENCOUNTER — OFFICE VISIT (OUTPATIENT)
Dept: FAMILY MEDICINE CLINIC | Age: 68
End: 2023-03-20
Payer: MEDICARE

## 2023-03-20 VITALS
DIASTOLIC BLOOD PRESSURE: 60 MMHG | SYSTOLIC BLOOD PRESSURE: 126 MMHG | BODY MASS INDEX: 23.19 KG/M2 | RESPIRATION RATE: 16 BRPM | WEIGHT: 162 LBS | OXYGEN SATURATION: 99 % | TEMPERATURE: 98.2 F | HEART RATE: 60 BPM | HEIGHT: 70 IN

## 2023-03-20 DIAGNOSIS — S02.85XA ORBITAL FRACTURE, CLOSED, INITIAL ENCOUNTER (HCC): ICD-10-CM

## 2023-03-20 DIAGNOSIS — S02.32XA CLOSED BLOW-OUT FRACTURE OF LEFT ORBITAL FLOOR (HCC): ICD-10-CM

## 2023-03-20 DIAGNOSIS — E78.5 HYPERLIPIDEMIA LDL GOAL <130: ICD-10-CM

## 2023-03-20 DIAGNOSIS — E55.9 VITAMIN D DEFICIENCY: ICD-10-CM

## 2023-03-20 DIAGNOSIS — Z87.39 HISTORY OF GOUT: ICD-10-CM

## 2023-03-20 DIAGNOSIS — I10 ESSENTIAL HYPERTENSION, BENIGN: Primary | ICD-10-CM

## 2023-03-20 PROCEDURE — G8420 CALC BMI NORM PARAMETERS: HCPCS | Performed by: FAMILY MEDICINE

## 2023-03-20 PROCEDURE — G8427 DOCREV CUR MEDS BY ELIG CLIN: HCPCS | Performed by: FAMILY MEDICINE

## 2023-03-20 PROCEDURE — G8510 SCR DEP NEG, NO PLAN REQD: HCPCS | Performed by: FAMILY MEDICINE

## 2023-03-20 PROCEDURE — G8536 NO DOC ELDER MAL SCRN: HCPCS | Performed by: FAMILY MEDICINE

## 2023-03-20 PROCEDURE — 1101F PT FALLS ASSESS-DOCD LE1/YR: CPT | Performed by: FAMILY MEDICINE

## 2023-03-20 PROCEDURE — 3078F DIAST BP <80 MM HG: CPT | Performed by: FAMILY MEDICINE

## 2023-03-20 PROCEDURE — 1123F ACP DISCUSS/DSCN MKR DOCD: CPT | Performed by: FAMILY MEDICINE

## 2023-03-20 PROCEDURE — 3017F COLORECTAL CA SCREEN DOC REV: CPT | Performed by: FAMILY MEDICINE

## 2023-03-20 PROCEDURE — 99215 OFFICE O/P EST HI 40 MIN: CPT | Performed by: FAMILY MEDICINE

## 2023-03-20 PROCEDURE — G0463 HOSPITAL OUTPT CLINIC VISIT: HCPCS | Performed by: FAMILY MEDICINE

## 2023-03-20 PROCEDURE — 3074F SYST BP LT 130 MM HG: CPT | Performed by: FAMILY MEDICINE

## 2023-03-20 NOTE — PROGRESS NOTES
HISTORY OF PRESENT ILLNESS  HPI  Janna Johnson is a 75 y.o. male with a history of HTN, BCC, gout, vitamin D deficiency and hyperlipidemia with LDL goal <130, who presents at the office today for f/u of these health problems. Pt has not had an alcoholic drink in 2 months. His dad was around 58 y.o. from CHF, but he did not have any stent or bypass. He also had hx of MS. His mom  at 80 y.o. but he does not know what from. She had no major health problem. His maternal grandmother  in her upper [de-identified] due to age-related complications. Pt says his mom did not know his maternal grandfather. Patient's paternal grandfather  early. The thinking is it was due to heart failure. Pt does not know if this was a sudden problem or not. His paternal grandmother  in her [de-identified]. Pt denies unusual SOB, chest pain, and any recent ER visits or hospitalizations. Past Medical History:   Diagnosis Date    Acute idiopathic gout involving toe of left foot 2018    Closed blow-out fracture of left orbital floor ([facility] [address] 2021    Essential hypertension, benign 03/15/2015    Gout 2018    Skin cancer     2 BCC's    Sun-damaged skin     Vitamin D deficiency 2018     Past Surgical History:   Procedure Laterality Date    HX COLONOSCOPY  ? age 47    overdue-family h/o    HX HERNIA REPAIR      HX TONSILLECTOMY       Current Outpatient Medications on File Prior to Visit   Medication Sig Dispense Refill    amLODIPine-benazepril (LOTREL) 5-10 mg per capsule TAKE 1 CAPSULE BY MOUTH EVERY DAY 90 Capsule 1    bisoprolol-hydroCHLOROthiazide (ZIAC) 5-6.25 mg per tablet TAKE 1 TABLET BY MOUTH EVERY DAY 90 Tablet 0    rosuvastatin (CRESTOR) 10 mg tablet TAKE 1 TAB BY MOUTH NIGHTLY 90 Tablet 0    allopurinoL (ZYLOPRIM) 300 mg tablet **DOSE INCREASE**TAKE 1 TAB BY MOUTH DAILY.  INDICATIONS: TREATMENT TO PREVENT ACUTE GOUT ATTACK 90 Tablet 0    cholecalciferol (VITAMIN D3) (5000 Units/125 mcg) tab tablet Take 1 Tab by mouth daily. [DISCONTINUED] gentamicin (GARAMYCIN) 0.1 % topical cream APPLY TO BIOPSY TWICE DAILY TILL HEALED       No current facility-administered medications on file prior to visit. No Known Allergies  Family History   Problem Relation Age of Onset    Heart Disease Mother     Mult Sclerosis Father     Heart Disease Father     No Known Problems Sister      Social History     Socioeconomic History    Marital status: SINGLE   Tobacco Use    Smoking status: Never    Smokeless tobacco: Never   Vaping Use    Vaping Use: Never used   Substance and Sexual Activity    Alcohol use: Yes     Alcohol/week: 11.7 standard drinks     Types: 14 Standard drinks or equivalent per week     Comment: occasionally    Sexual activity: Yes     Partners: Female     Social Determinants of Health     Financial Resource Strain: Low Risk     Difficulty of Paying Living Expenses: Not hard at all   Food Insecurity: No Food Insecurity    Worried About Running Out of Food in the Last Year: Never true    Ran Out of Food in the Last Year: Never true               Review of Systems   Constitutional:  Negative for chills, diaphoresis, fever, malaise/fatigue and weight loss. Eyes:  Negative for blurred vision, double vision, pain and redness. Respiratory:  Negative for cough, shortness of breath and wheezing. Cardiovascular:  Negative for chest pain, palpitations, orthopnea, claudication, leg swelling and PND. Skin:  Negative for itching and rash. Neurological:  Negative for dizziness, tingling, tremors, sensory change, speech change, focal weakness, seizures, loss of consciousness, weakness and headaches.    Results for orders placed or performed in visit on 93/15/84   METABOLIC PANEL, COMPREHENSIVE   Result Value Ref Range    Sodium 132 (L) 136 - 145 mmol/L    Potassium 4.2 3.5 - 5.1 mmol/L    Chloride 98 97 - 108 mmol/L    CO2 27 21 - 32 mmol/L    Anion gap 7 5 - 15 mmol/L    Glucose 119 (H) 65 - 100 mg/dL    BUN 20 6 - 20 MG/DL    Creatinine 1.20 0.70 - 1.30 MG/DL    BUN/Creatinine ratio 17 12 - 20      GFR est AA >60 >60 ml/min/1.73m2    GFR est non-AA >60 >60 ml/min/1.73m2    Calcium 9.2 8.5 - 10.1 MG/DL    Bilirubin, total 1.2 (H) 0.2 - 1.0 MG/DL    ALT (SGPT) 69 12 - 78 U/L    AST (SGOT) 55 (H) 15 - 37 U/L    Alk. phosphatase 92 45 - 117 U/L    Protein, total 6.6 6.4 - 8.2 g/dL    Albumin 3.9 3.5 - 5.0 g/dL    Globulin 2.7 2.0 - 4.0 g/dL    A-G Ratio 1.4 1.1 - 2.2     LIPID PANEL   Result Value Ref Range    Cholesterol, total 149 <200 MG/DL    Triglyceride 113 <150 MG/DL    HDL Cholesterol 76 MG/DL    LDL, calculated 50.4 0 - 100 MG/DL    VLDL, calculated 22.6 MG/DL    CHOL/HDL Ratio 2.0 0.0 - 5.0             Physical Exam  Visit Vitals  /60 (BP 1 Location: Left upper arm, BP Patient Position: Sitting, BP Cuff Size: Adult)   Pulse 60   Temp 98.2 °F (36.8 °C) (Temporal)   Resp 16   Ht 5' 10\" (1.778 m)   Wt 162 lb (73.5 kg)   SpO2 99%   BMI 23.24 kg/m²       Head: Normocephalic, without obvious abnormality, atraumatic  Eyes: conjunctivae/corneas clear. PERRL, EOM's intact. Neck: supple, symmetrical, trachea midline, no adenopathy, thyroid: not enlarged, symmetric, no tenderness/mass/nodules, no carotid bruit and no JVD  Lungs: clear to auscultation bilaterally  Heart: regular rate and rhythm, S1, S2 normal, no murmur, click, rub or gallop  Extremities: extremities normal, atraumatic, no cyanosis or edema  Pulses: 2+ and symmetric  Lymph nodes: Cervical, supraclavicular, and axillary nodes normal.  Neurologic: Grossly normal    ASSESSMENT and PLAN    ICD-10-CM ICD-9-CM    1. Essential hypertension, benign  I10 401.1       2. Hyperlipidemia LDL goal <130  E78.5 272.4 LIPID PANEL      METABOLIC PANEL, COMPREHENSIVE      LIPID PANEL      METABOLIC PANEL, COMPREHENSIVE      3. History of gout  Z87.39 V12.29       4. Vitamin D deficiency  E55.9 268.9       5.  [facility]  S02.85XA 802.8 6. Closed blow-out fracture of left orbital floor ([facility]  S02. 32XA 802.6         Diagnoses and all orders for this visit:    1. Essential hypertension, benign    2. Hyperlipidemia LDL goal <130  -     LIPID PANEL; Future  -     METABOLIC PANEL, COMPREHENSIVE; Future    3. History of gout    4. Vitamin D deficiency    5. Orbital fracture, closed, initial encounter ([facility]    6. Closed blow-out fracture of left orbital floor ([facility]    Follow-up and Dispositions    Return in about 6 months (around 9/20/2023) for F/U HTN and CHOL, f/u Vitamin D deficiency; fatigue, F/U gout. lab results and schedule of future lab studies reviewed with patient  reviewed diet, exercise and weight control  cardiovascular risk and specific lipid/LDL goals reviewed  reviewed medications and side effects in detail  Please call my office if there are any questions- [phone]. I will arrange for follow up after the lab tests done from today return  Recommended a weekly \"heart check. \" I went into detail how to do this. Call for refills on medications as needed. Discussed expected course/resolution/complications of diagnosis in detail with patient. Medication risks/benefits/costs/interactions/alternatives discussed with patient. Pt was given an after visit summary which includes diagnoses, current medications & vitals. Pt expressed understanding with the diagnosis and plan    Total 45 minutes  re: Recommended a weekly \"heart check. \" I went into detail how to do this. Regular exercise is very important to your health; it helps mentally, physically, socially; it prevents injuries if done properly. Exercise, even as simple as walking 20-30 minutes daily has major benefits to your health even though your \"numbers\" are the same in the lab. See if you can add this into your daily regimen and after a few months it will become a regular habit-\"just something you do,\" like brushing your teeth.      A combination of aerobic exercise and strengthening and stretching is felt to be the best for you, so this should be your ultimate goal.   This can be done in the privacy of your home or in a group setting as at the gym  Some prefer having a , others prefer to do exercise in groups or individually. Do what \"works\" for you. You need to make it simple and \"fun,\" or you most likely will not continue it. Reviewed symptoms, or lack thereof, of hypertension and elevated cholesterol. Review of  the proper technique of checking the blood pressure- check it on an average day only, not on a stressful day, sitting, no exercise for at least 1 hour and not experiencing any new pain( chronic pain is OK). Patient encouraged to check BP sitting and standing at least once a month and to report these readings to me if > 140/ 90 on average , or if the standing BP is >  15 points lower than the sitting. Always check it twice and if there is > 5 points decrease from the previous reading( top reading or systolic) keep checking it until it does not drop 5 points. Write only this final reading down, not the preceding readings. If out of these readings there is only 1 out of 4 or less > 560, or > 90 diastolic then your blood pressure is OK; it needs further treatment if it is above this. Also, don't forget,  as noted above, to check your blood pressure standing once a month; this is to detect a drop in your BP that might lead to fainting and serious injury; you check it standing with your arm hanging straight down and relaxed. Check it twice waiting 1 minute between the two readings. If, with either one of these 2 readings there is a > 15 point drop of the systolic compared to your sitting pressure( done before the standing BP), then let me know. Following these guidelines, continue to check your BP and write down only the ones described above and it will help me to effectively treat your blood pressure.  Also, discussed symptoms of concern that were noted today in the note above, treatment options( including doing nothing), when to follow up before recommended time frame. Also, answered all questions. I encouraged pt to continue with his non-drinking of alcohol as it has had a positive effect on his weight and BP. We talked about increasing his rosuvastatin to 20 mg if his lab tests come back normal. He did mention he has had difficulty since COVID and decreasing his work hours, and also his injury when he fell down some stairs 8/2021 and had significant injuries to his eye and nose. He mentioned that he feels depressed at times, but does not want any medication for that. We talked about giving it time, becoming more active, starting back some exercise, and if things are not improving, he can try medicine and/ or counseling to get him through the depression. He will think about it but will not do anything right away. He denies SI/HI. I recommended pt get his Prevnar 20. When they recommend a new booster for the [address] vaccine, I recommended he get that as well. This document was written by Haydee Perez, as dictated by Juan Lozano MD.   I have reviewed and agree with the above note and have made corrections where appropriate Mark C. Doreene Litten M.D.

## 2023-03-20 NOTE — PROGRESS NOTES
Chief Complaint   Patient presents with    Hypertension    Cholesterol Problem    1. \"Have you been to the ER, urgent care clinic since your last visit? Hospitalized since your last visit? \" No    2. \"Have you seen or consulted any other health care providers outside of the [address] [place] [address] since your last visit? \" Yes Dr Olesya Perez eye dr      3. For patients over 45: Has the patient had a colonoscopy?  Yes - no Care Gap present

## 2023-03-21 LAB
ALBUMIN SERPL-MCNC: 3.9 G/DL (ref 3.5–5)
ALBUMIN/GLOB SERPL: 1.6 (ref 1.1–2.2)
ALP SERPL-CCNC: 82 U/L (ref 45–117)
ALT SERPL-CCNC: 39 U/L (ref 12–78)
ANION GAP SERPL CALC-SCNC: 2 MMOL/L (ref 5–15)
AST SERPL-CCNC: 31 U/L (ref 15–37)
BILIRUB SERPL-MCNC: 0.4 MG/DL (ref 0.2–1)
BUN SERPL-MCNC: 16 MG/DL (ref 6–20)
BUN/CREAT SERPL: 13 (ref 12–20)
CALCIUM SERPL-MCNC: 9.2 MG/DL (ref 8.5–10.1)
CHLORIDE SERPL-SCNC: 104 MMOL/L (ref 97–108)
CHOLEST SERPL-MCNC: 131 MG/DL
CO2 SERPL-SCNC: 30 MMOL/L (ref 21–32)
CREAT SERPL-MCNC: 1.19 MG/DL (ref 0.7–1.3)
GLOBULIN SER CALC-MCNC: 2.4 G/DL (ref 2–4)
GLUCOSE SERPL-MCNC: 108 MG/DL (ref 65–100)
HDLC SERPL-MCNC: 49 MG/DL
HDLC SERPL: 2.7 (ref 0–5)
LDLC SERPL CALC-MCNC: 66.8 MG/DL (ref 0–100)
POTASSIUM SERPL-SCNC: 4.3 MMOL/L (ref 3.5–5.1)
PROT SERPL-MCNC: 6.3 G/DL (ref 6.4–8.2)
SODIUM SERPL-SCNC: 136 MMOL/L (ref 136–145)
TRIGL SERPL-MCNC: 76 MG/DL (ref ?–150)
VLDLC SERPL CALC-MCNC: 15.2 MG/DL

## 2023-04-30 DIAGNOSIS — E78.5 HYPERLIPIDEMIA LDL GOAL <130: ICD-10-CM

## 2023-04-30 DIAGNOSIS — I10 ESSENTIAL HYPERTENSION, BENIGN: ICD-10-CM

## 2023-05-01 RX ORDER — ROSUVASTATIN CALCIUM 10 MG/1
TABLET, COATED ORAL
Qty: 90 TABLET | Refills: 0 | Status: SHIPPED | OUTPATIENT
Start: 2023-05-01

## 2023-05-01 RX ORDER — BISOPROLOL FUMARATE AND HYDROCHLOROTHIAZIDE 5; 6.25 MG/1; MG/1
TABLET ORAL
Qty: 90 TABLET | Refills: 0 | Status: SHIPPED | OUTPATIENT
Start: 2023-05-01

## 2023-05-17 RX ORDER — BISOPROLOL FUMARATE AND HYDROCHLOROTHIAZIDE 5; 6.25 MG/1; MG/1
1 TABLET ORAL DAILY
COMMUNITY
Start: 2023-05-01

## 2023-05-17 RX ORDER — AMLODIPINE BESYLATE AND BENAZEPRIL HYDROCHLORIDE 5; 10 MG/1; MG/1
1 CAPSULE ORAL DAILY
COMMUNITY
Start: 2023-03-19

## 2023-05-17 RX ORDER — ROSUVASTATIN CALCIUM 10 MG/1
1 TABLET, COATED ORAL NIGHTLY
COMMUNITY
Start: 2023-05-01

## 2023-05-17 RX ORDER — ALLOPURINOL 300 MG/1
TABLET ORAL
COMMUNITY
Start: 2022-11-30 | End: 2023-06-10 | Stop reason: SDDI

## 2023-06-05 ENCOUNTER — TELEPHONE (OUTPATIENT)
Age: 68
End: 2023-06-05

## 2023-06-05 NOTE — TELEPHONE ENCOUNTER
----- Message from Francisco Pruitt, [address] sent at 6/5/2023  4:33 PM EDT -----  Subject: Appointment Request    Reason for Call: Established Patient Appointment needed: Routine Existing   Condition Follow Up    QUESTIONS    Reason for appointment request? Available appointments did not meet   patient need     Additional Information for Provider?  Pt request a routine follow up no   problems, only wants in office visit, offered 8/23, wants appointment   sooner because he hasn't been seen in a while.   ---------------------------------------------------------------------------  --------------  [id number]  [phone]; OK to leave message on voicemail  ---------------------------------------------------------------------------  --------------  SCRIPT ANSWERS  MARITZA Screen: Usman Beltrán

## 2023-06-09 ENCOUNTER — OFFICE VISIT (OUTPATIENT)
Age: 68
End: 2023-06-09
Payer: MEDICARE

## 2023-06-09 VITALS
SYSTOLIC BLOOD PRESSURE: 122 MMHG | TEMPERATURE: 97.3 F | DIASTOLIC BLOOD PRESSURE: 78 MMHG | WEIGHT: 152 LBS | RESPIRATION RATE: 16 BRPM | OXYGEN SATURATION: 97 % | HEART RATE: 68 BPM | BODY MASS INDEX: 21.76 KG/M2 | HEIGHT: 70 IN

## 2023-06-09 DIAGNOSIS — Z12.5 PROSTATE CANCER SCREENING: ICD-10-CM

## 2023-06-09 DIAGNOSIS — R41.3 MEMORY CHANGES: Primary | ICD-10-CM

## 2023-06-09 DIAGNOSIS — Z11.59 NEED FOR HEPATITIS C SCREENING TEST: ICD-10-CM

## 2023-06-09 DIAGNOSIS — E55.9 VITAMIN D DEFICIENCY, UNSPECIFIED: ICD-10-CM

## 2023-06-09 PROCEDURE — 99214 OFFICE O/P EST MOD 30 MIN: CPT | Performed by: FAMILY MEDICINE

## 2023-06-09 SDOH — ECONOMIC STABILITY: INCOME INSECURITY: HOW HARD IS IT FOR YOU TO PAY FOR THE VERY BASICS LIKE FOOD, HOUSING, MEDICAL CARE, AND HEATING?: NOT VERY HARD

## 2023-06-09 SDOH — ECONOMIC STABILITY: HOUSING INSECURITY
IN THE LAST 12 MONTHS, WAS THERE A TIME WHEN YOU DID NOT HAVE A STEADY PLACE TO SLEEP OR SLEPT IN A SHELTER (INCLUDING NOW)?: NO

## 2023-06-09 SDOH — ECONOMIC STABILITY: FOOD INSECURITY: WITHIN THE PAST 12 MONTHS, YOU WORRIED THAT YOUR FOOD WOULD RUN OUT BEFORE YOU GOT MONEY TO BUY MORE.: NEVER TRUE

## 2023-06-09 SDOH — ECONOMIC STABILITY: FOOD INSECURITY: WITHIN THE PAST 12 MONTHS, THE FOOD YOU BOUGHT JUST DIDN'T LAST AND YOU DIDN'T HAVE MONEY TO GET MORE.: NEVER TRUE

## 2023-06-09 NOTE — PROGRESS NOTES
Chief Complaint   Patient presents with    Weight Loss    Anxiety     Chest tight with stress    1. \"Have you been to the ER, urgent care clinic since your last visit? Hospitalized since your last visit? \" no  2. \"Have you seen or consulted any other health care providers outside of the [address] [place] [address] since your last visit? \" yes - Dr Miguel Arce for eyes  3. For patients over 45: Has the patient had a colonoscopy? Yes     If the patient is female:    4. For patients over 40: Has the patient had a mammogram? N/A    5. For patients over 21: Has the patient had a pap smear?  N/A

## 2023-06-09 NOTE — PROGRESS NOTES
Subjective     HPI    Adam Victoria is a 75 y.o. male with a history of HTN, BCC, gout, vitamin D deficiency and hyperlipidemia with LDL goal <130, who presents at the office today for f/u of these health problems. The pt is coming in today to f/u concerns about friends feeling he may be having memory problems, having trouble doing/ following simple instructions, etc. The pt got here at 7:20 in the morning even though his appointment was at 9:30. He had no clear explanation of why he got here so early, The office does not open until 8 AM ( doors open at 7:45)    When asked what he is here today for, he states that he has had some tenderness in his shoulder blade area when breathing. He just \" doesn't feel good,feels tired. The pt notes that he had moments where he has been hyperventilating, which could be related to him getting flustered last week dealing with some health care issues. The pt said that one day he was at home and felt like he could not breathe when walking down the hallway, which lasted for 20 minutes. The pt had a bowl of cheerios for breakfast, with no milk and had some water on arrival here. The pt denies having any fever. However, he did states that he has been having some symptoms of sweating and tremors. Pt denies unusual SOB, chest pain, and any recent ER visits or hospitalizations.      Past Medical History:   Diagnosis Date    Acute idiopathic gout involving toe of left foot 09/18/2018    Closed blow-out fracture of left orbital floor ([facility] [address] 08/19/2021    Essential hypertension, benign 03/15/2015    Gout 09/26/2018    Skin cancer     2 BCC's    Sun-damaged skin     Vitamin D deficiency 02/11/2018     Past Surgical History:   Procedure Laterality Date    CATARACT EXTRACTION Bilateral     COLONOSCOPY  ? age 47    overdue-family h/o    HERNIA REPAIR      TONSILLECTOMY       Current Outpatient Medications on File Prior to Visit   Medication Sig Dispense Refill

## 2023-06-10 LAB
ALBUMIN SERPL-MCNC: 4 G/DL (ref 3.5–5)
ALBUMIN/GLOB SERPL: 1.7 (ref 1.1–2.2)
ALP SERPL-CCNC: 79 U/L (ref 45–117)
ALT SERPL-CCNC: 36 U/L (ref 12–78)
ANION GAP SERPL CALC-SCNC: 6 MMOL/L (ref 5–15)
AST SERPL-CCNC: 26 U/L (ref 15–37)
BILIRUB SERPL-MCNC: 0.7 MG/DL (ref 0.2–1)
BUN SERPL-MCNC: 21 MG/DL (ref 6–20)
BUN/CREAT SERPL: 19 (ref 12–20)
CALCIUM SERPL-MCNC: 9.4 MG/DL (ref 8.5–10.1)
CHLORIDE SERPL-SCNC: 98 MMOL/L (ref 97–108)
CO2 SERPL-SCNC: 30 MMOL/L (ref 21–32)
CREAT SERPL-MCNC: 1.1 MG/DL (ref 0.7–1.3)
ERYTHROCYTE [DISTWIDTH] IN BLOOD BY AUTOMATED COUNT: 12.4 % (ref 11.5–14.5)
GLOBULIN SER CALC-MCNC: 2.4 G/DL (ref 2–4)
GLUCOSE SERPL-MCNC: 103 MG/DL (ref 65–100)
HCT VFR BLD AUTO: 43.8 % (ref 36.6–50.3)
HGB BLD-MCNC: 14.2 G/DL (ref 12.1–17)
MCH RBC QN AUTO: 29.5 PG (ref 26–34)
MCHC RBC AUTO-ENTMCNC: 32.4 G/DL (ref 30–36.5)
MCV RBC AUTO: 90.9 FL (ref 80–99)
NRBC # BLD: 0 K/UL (ref 0–0.01)
NRBC BLD-RTO: 0 PER 100 WBC
PLATELET # BLD AUTO: 231 K/UL (ref 150–400)
PMV BLD AUTO: 9.9 FL (ref 8.9–12.9)
POTASSIUM SERPL-SCNC: 3.9 MMOL/L (ref 3.5–5.1)
PROT SERPL-MCNC: 6.4 G/DL (ref 6.4–8.2)
RBC # BLD AUTO: 4.82 M/UL (ref 4.1–5.7)
SODIUM SERPL-SCNC: 134 MMOL/L (ref 136–145)
T4 FREE SERPL-MCNC: 1 NG/DL (ref 0.8–1.5)
TSH SERPL DL<=0.05 MIU/L-ACNC: 1.33 UIU/ML (ref 0.36–3.74)
VIT B12 SERPL-MCNC: 309 PG/ML (ref 193–986)
WBC # BLD AUTO: 8.1 K/UL (ref 4.1–11.1)

## 2023-07-29 DIAGNOSIS — E78.5 HYPERLIPIDEMIA, UNSPECIFIED: ICD-10-CM

## 2023-07-31 RX ORDER — ROSUVASTATIN CALCIUM 10 MG/1
TABLET, COATED ORAL
Qty: 90 TABLET | Refills: 1 | Status: SHIPPED | OUTPATIENT
Start: 2023-07-31

## 2023-09-09 DIAGNOSIS — I10 ESSENTIAL (PRIMARY) HYPERTENSION: ICD-10-CM

## 2023-09-11 DIAGNOSIS — I10 ESSENTIAL (PRIMARY) HYPERTENSION: ICD-10-CM

## 2023-09-11 RX ORDER — BISOPROLOL FUMARATE AND HYDROCHLOROTHIAZIDE 5; 6.25 MG/1; MG/1
1 TABLET ORAL DAILY
Qty: 90 TABLET | Refills: 0 | Status: SHIPPED | OUTPATIENT
Start: 2023-09-11

## 2023-09-11 RX ORDER — AMLODIPINE BESYLATE AND BENAZEPRIL HYDROCHLORIDE 5; 10 MG/1; MG/1
CAPSULE ORAL DAILY
Qty: 90 CAPSULE | Refills: 1 | Status: SHIPPED | OUTPATIENT
Start: 2023-09-11

## 2023-09-29 ENCOUNTER — TELEPHONE (OUTPATIENT)
Age: 68
End: 2023-09-29

## 2023-09-29 NOTE — TELEPHONE ENCOUNTER
----- Message from [facility] BRUCE Weaver sent at 9/29/2023  4:17 PM EDT -----  Subject: Message to Provider    QUESTIONS  Information for Provider? The pt called and would like for his provider to   call him regarding vaccines and also an appointment. The pt declined   scheduling at this time and would like to personally speak with Dr. Brandy Verma. The pt stated he has been trying the providers cell phone and would just   like to talk about personal issues and get some advice. Please advise.  ---------------------------------------------------------------------------  --------------  Jennifer Pandey DBT  [phone]; OK to leave message on voicemail  ---------------------------------------------------------------------------  --------------  SCRIPT ANSWERS  Relationship to Patient?  Self

## 2023-10-09 NOTE — TELEPHONE ENCOUNTER
Spoke with patient @ 5PM and informed him and at his suggestion, his sister Shirin Curiel( @6:45PM that he is due Prevnar 20, flu , latest Covid , and the shingles vaccines. His tetanus is up to date. I encouraged him to do the 3 object memory test( has not been doing as directed at last visit) this week and I will check back with him later this week to see how he is doing. Reviewed with he and his sister( verbal OK given by patient) his lab work from a few months ago, which is all normal, especially the Thyroid function tests and the Vitamin B 12 levels.  Explained that it is important to catch memory problems early so the medication can have the best chance to stabilize and possibly improve the memory

## 2023-10-13 NOTE — TELEPHONE ENCOUNTER
Spoke with patient- still not doing 3 object memory test recommended; states he has too many things on his mind to do that. Will try again in a week to get memory info; explained importance of doing this to treat any memory loss when it is mild and more likely to respond to treatment. He states he will do this before I call back n a week.

## 2023-12-08 DIAGNOSIS — I10 ESSENTIAL (PRIMARY) HYPERTENSION: ICD-10-CM

## 2023-12-09 DIAGNOSIS — E78.5 HYPERLIPIDEMIA, UNSPECIFIED: ICD-10-CM

## 2023-12-09 RX ORDER — BISOPROLOL FUMARATE AND HYDROCHLOROTHIAZIDE 5; 6.25 MG/1; MG/1
1 TABLET ORAL DAILY
Qty: 90 TABLET | Refills: 1 | Status: SHIPPED | OUTPATIENT
Start: 2023-12-09

## 2023-12-11 RX ORDER — ROSUVASTATIN CALCIUM 10 MG/1
TABLET, COATED ORAL
Qty: 90 TABLET | Refills: 0 | Status: SHIPPED | OUTPATIENT
Start: 2023-12-11

## 2023-12-11 NOTE — TELEPHONE ENCOUNTER
PCP: Radha Turner MD      No future appointments.     Requested Prescriptions     Pending Prescriptions Disp Refills    rosuvastatin (CRESTOR) 10 MG tablet [Pharmacy Med Name: ROSUVASTATIN CALCIUM 10 MG TAB] 90 tablet 1     Sig: TAKE 1 TABLET BY MOUTH EVERY DAY AT NIGHT

## 2024-03-07 DIAGNOSIS — I10 ESSENTIAL (PRIMARY) HYPERTENSION: ICD-10-CM

## 2024-03-07 NOTE — TELEPHONE ENCOUNTER
Left detailed message informing the pt that we received a request for his Amlodipine.However it has yet to be filled until pt make's an In Office Appt.

## 2024-03-11 DIAGNOSIS — I10 ESSENTIAL (PRIMARY) HYPERTENSION: ICD-10-CM

## 2024-03-11 RX ORDER — AMLODIPINE BESYLATE AND BENAZEPRIL HYDROCHLORIDE 5; 10 MG/1; MG/1
1 CAPSULE ORAL DAILY
Qty: 90 CAPSULE | Refills: 0 | Status: SHIPPED | OUTPATIENT
Start: 2024-03-11 | End: 2024-03-15

## 2024-03-11 NOTE — TELEPHONE ENCOUNTER
----- Message from Nilda Fernandez sent at 3/11/2024 11:16 AM EDT -----  Subject: Refill Request    QUESTIONS  Name of Medication? amLODIPine-benazepril (LOTREL) 5-10 MG per capsule  Patient-reported dosage and instructions? 1 a day   How many days do you have left? 6  Preferred Pharmacy? [id number]  Pharmacy phone number (if available)? [phone]  ---------------------------------------------------------------------------  --------------  CALL BACK INFO  What is the best way for the office to contact you? OK to leave message on   voicemail  Preferred Call Back Phone Number? [phone]  ---------------------------------------------------------------------------  --------------  SCRIPT ANSWERS  Relationship to Patient? Self

## 2024-03-11 NOTE — TELEPHONE ENCOUNTER
----- Message from Nilda Fernandez sent at 3/11/2024 11:16 AM EDT -----  Subject: Refill Request    QUESTIONS  Name of Medication? amLODIPine-benazepril (LOTREL) 5-10 MG per capsule  Patient-reported dosage and instructions? 1 a day   How many days do you have left? 6  Preferred Pharmacy? [id number]  Pharmacy phone number (if available)? [phone]  ---------------------------------------------------------------------------  --------------  CALL BACK INFO  What is the best way for the office to contact you? OK to leave message on   voicemail  Preferred Call Back Phone Number? [phone]  ---------------------------------------------------------------------------  --------------  SCRIPT ANSWERS  Relationship to Patient? Self

## 2024-03-14 ENCOUNTER — OFFICE VISIT (OUTPATIENT)
Age: 69
End: 2024-03-14
Payer: MEDICARE

## 2024-03-14 VITALS
OXYGEN SATURATION: 98 % | DIASTOLIC BLOOD PRESSURE: 74 MMHG | SYSTOLIC BLOOD PRESSURE: 134 MMHG | WEIGHT: 172 LBS | HEIGHT: 70 IN | RESPIRATION RATE: 16 BRPM | BODY MASS INDEX: 24.62 KG/M2 | TEMPERATURE: 98 F | HEART RATE: 64 BPM

## 2024-03-14 DIAGNOSIS — I10 ESSENTIAL HYPERTENSION, BENIGN: Primary | ICD-10-CM

## 2024-03-14 DIAGNOSIS — R41.3 MEMORY CHANGES: ICD-10-CM

## 2024-03-14 DIAGNOSIS — R04.0 FREQUENT NOSEBLEEDS: ICD-10-CM

## 2024-03-14 DIAGNOSIS — E78.5 DYSLIPIDEMIA, GOAL LDL BELOW 130: ICD-10-CM

## 2024-03-14 DIAGNOSIS — E55.9 VITAMIN D DEFICIENCY, UNSPECIFIED: ICD-10-CM

## 2024-03-14 DIAGNOSIS — J30.1 SEASONAL ALLERGIC RHINITIS DUE TO POLLEN: ICD-10-CM

## 2024-03-14 DIAGNOSIS — E78.5 HYPERLIPIDEMIA LDL GOAL <130: ICD-10-CM

## 2024-03-14 DIAGNOSIS — L85.3 DRY SKIN DERMATITIS: ICD-10-CM

## 2024-03-14 LAB
ALBUMIN SERPL-MCNC: 4.1 G/DL (ref 3.5–5)
ALBUMIN/GLOB SERPL: 1.5 (ref 1.1–2.2)
ALP SERPL-CCNC: 102 U/L (ref 45–117)
ALT SERPL-CCNC: 36 U/L (ref 12–78)
ANION GAP SERPL CALC-SCNC: 7 MMOL/L (ref 5–15)
AST SERPL-CCNC: 30 U/L (ref 15–37)
BILIRUB SERPL-MCNC: 1 MG/DL (ref 0.2–1)
BUN SERPL-MCNC: 24 MG/DL (ref 6–20)
BUN/CREAT SERPL: 19 (ref 12–20)
CALCIUM SERPL-MCNC: 9.3 MG/DL (ref 8.5–10.1)
CHLORIDE SERPL-SCNC: 100 MMOL/L (ref 97–108)
CHOLEST SERPL-MCNC: 179 MG/DL
CO2 SERPL-SCNC: 30 MMOL/L (ref 21–32)
CREAT SERPL-MCNC: 1.24 MG/DL (ref 0.7–1.3)
ERYTHROCYTE [DISTWIDTH] IN BLOOD BY AUTOMATED COUNT: 12.2 % (ref 11.5–14.5)
GLOBULIN SER CALC-MCNC: 2.8 G/DL (ref 2–4)
GLUCOSE SERPL-MCNC: 115 MG/DL (ref 65–100)
HCT VFR BLD AUTO: 46.2 % (ref 36.6–50.3)
HDLC SERPL-MCNC: 71 MG/DL
HDLC SERPL: 2.5 (ref 0–5)
HGB BLD-MCNC: 15.2 G/DL (ref 12.1–17)
LDLC SERPL CALC-MCNC: 88.8 MG/DL (ref 0–100)
MCH RBC QN AUTO: 29.7 PG (ref 26–34)
MCHC RBC AUTO-ENTMCNC: 32.9 G/DL (ref 30–36.5)
MCV RBC AUTO: 90.2 FL (ref 80–99)
NRBC # BLD: 0 K/UL (ref 0–0.01)
NRBC BLD-RTO: 0 PER 100 WBC
PLATELET # BLD AUTO: 212 K/UL (ref 150–400)
PMV BLD AUTO: 10.1 FL (ref 8.9–12.9)
POTASSIUM SERPL-SCNC: 4.1 MMOL/L (ref 3.5–5.1)
PROT SERPL-MCNC: 6.9 G/DL (ref 6.4–8.2)
RBC # BLD AUTO: 5.12 M/UL (ref 4.1–5.7)
SODIUM SERPL-SCNC: 137 MMOL/L (ref 136–145)
TRIGL SERPL-MCNC: 96 MG/DL
VLDLC SERPL CALC-MCNC: 19.2 MG/DL
WBC # BLD AUTO: 8.4 K/UL (ref 4.1–11.1)

## 2024-03-14 PROCEDURE — G8484 FLU IMMUNIZE NO ADMIN: HCPCS | Performed by: FAMILY MEDICINE

## 2024-03-14 PROCEDURE — 99215 OFFICE O/P EST HI 40 MIN: CPT | Performed by: FAMILY MEDICINE

## 2024-03-14 PROCEDURE — 3078F DIAST BP <80 MM HG: CPT | Performed by: FAMILY MEDICINE

## 2024-03-14 PROCEDURE — 3017F COLORECTAL CA SCREEN DOC REV: CPT | Performed by: FAMILY MEDICINE

## 2024-03-14 PROCEDURE — G8427 DOCREV CUR MEDS BY ELIG CLIN: HCPCS | Performed by: FAMILY MEDICINE

## 2024-03-14 PROCEDURE — 3075F SYST BP GE 130 - 139MM HG: CPT | Performed by: FAMILY MEDICINE

## 2024-03-14 PROCEDURE — 1123F ACP DISCUSS/DSCN MKR DOCD: CPT | Performed by: FAMILY MEDICINE

## 2024-03-14 PROCEDURE — 1036F TOBACCO NON-USER: CPT | Performed by: FAMILY MEDICINE

## 2024-03-14 PROCEDURE — G8420 CALC BMI NORM PARAMETERS: HCPCS | Performed by: FAMILY MEDICINE

## 2024-03-14 RX ORDER — LORATADINE 10 MG/1
10 CAPSULE, LIQUID FILLED ORAL DAILY
COMMUNITY

## 2024-03-14 RX ORDER — ROSUVASTATIN CALCIUM 10 MG/1
10 TABLET, COATED ORAL NIGHTLY
Qty: 90 TABLET | Refills: 3 | Status: SHIPPED | OUTPATIENT
Start: 2024-03-14

## 2024-03-14 ASSESSMENT — ENCOUNTER SYMPTOMS
EYE PAIN: 0
WHEEZING: 0
COUGH: 0
SHORTNESS OF BREATH: 0
EYE REDNESS: 0

## 2024-03-14 ASSESSMENT — PATIENT HEALTH QUESTIONNAIRE - PHQ9
SUM OF ALL RESPONSES TO PHQ QUESTIONS 1-9: 0
SUM OF ALL RESPONSES TO PHQ QUESTIONS 1-9: 0
SUM OF ALL RESPONSES TO PHQ9 QUESTIONS 1 & 2: 0
2. FEELING DOWN, DEPRESSED OR HOPELESS: 0
SUM OF ALL RESPONSES TO PHQ QUESTIONS 1-9: 0
1. LITTLE INTEREST OR PLEASURE IN DOING THINGS: 0
SUM OF ALL RESPONSES TO PHQ QUESTIONS 1-9: 0

## 2024-03-14 NOTE — PROGRESS NOTES
Chief Complaint   Patient presents with    Hypertension    Cholesterol Problem      \"Have you been to the ER, urgent care clinic since your last visit?  Hospitalized since your last visit?\"    NO    “Have you seen or consulted any other health care providers outside of [facility] since your last visit?”    Dr Pacheco    “Have you had a colorectal cancer screening such as a colonoscopy/FIT/Cologuard?    NO    Date of last Colonoscopy: 11/29/2018  No cologuard on file  No FIT/FOBT on file   No flexible sigmoidoscopy on file         
in the note above, treatment options( including doing nothing), when to follow up before recommended time frame. Also, answered all questions.    Pt complained of some SOB at times, and nothing on exam supports there being any lung, or heart problem causing this. He really hasn't noticed it during exertion, and it hasn't increased his SOB during activity. I encouraged him to start doing a heart check on a weekly basis, and let us know if he has difficulty doing that at the same level each week.     It appears that his mental function as far as memory is better than it was 3-4 months ago as he was unable to remember the 3/3 objects, occasionally remembering one, but none more than that. I encouraged him to do the 3 object memory test on a weekly basis to continue monitoring that and to notify me before his next visit if he is having deterioration of that. Perhaps some of his memory issues were from his TBI    Recommended changing to Lever 2000 soap, decreasing the temperature of the bathing water, and to let us know if that doesn't control his dry skin issues.     Encouraged patient to get the latest shingles vaccine.Informed the shingles vaccine is covered by Medicare and most commercial insurances.    Discussion re: Covid 19 infection, prevention, treatment limitations, importance of masks and distancing, and the available vaccines and now boosters recommended for Pfizex and Moderna at 5-6 months and then again at additional 4 months for those at high risk. The new recommendation Sept '22 to get the bivalent vaccine booster for the Omicron variant and in summer 2023 to get this vaccine again in the fall for \"high risk patients and optional for others due to anticipated flu and Covid 19 increase in the fall and winter season. New update end of Sept 2023- new booster available for everyone but highly recommended for those over 65 and anyone with significant health  problems that put them at risk of complications from

## 2024-03-15 DIAGNOSIS — I10 ESSENTIAL (PRIMARY) HYPERTENSION: ICD-10-CM

## 2024-03-15 PROBLEM — S02.32XA CLOSED BLOW-OUT FRACTURE OF LEFT ORBITAL FLOOR (HCC): Status: RESOLVED | Noted: 2021-08-19 | Resolved: 2024-03-15

## 2024-03-15 PROBLEM — J30.1 SEASONAL ALLERGIC RHINITIS DUE TO POLLEN: Status: ACTIVE | Noted: 2024-03-15

## 2024-03-15 PROBLEM — L85.3 DRY SKIN DERMATITIS: Status: ACTIVE | Noted: 2024-03-15

## 2024-03-15 RX ORDER — AMLODIPINE BESYLATE AND BENAZEPRIL HYDROCHLORIDE 5; 10 MG/1; MG/1
CAPSULE ORAL DAILY
Qty: 90 CAPSULE | Refills: 1 | Status: SHIPPED | OUTPATIENT
Start: 2024-03-15

## 2024-03-15 RX ORDER — AMLODIPINE BESYLATE AND BENAZEPRIL HYDROCHLORIDE 5; 10 MG/1; MG/1
CAPSULE ORAL DAILY
Qty: 90 CAPSULE | Refills: 1 | OUTPATIENT
Start: 2024-03-15

## 2024-06-04 DIAGNOSIS — I10 ESSENTIAL (PRIMARY) HYPERTENSION: ICD-10-CM

## 2024-06-04 RX ORDER — BISOPROLOL FUMARATE AND HYDROCHLOROTHIAZIDE 5; 6.25 MG/1; MG/1
1 TABLET ORAL DAILY
Qty: 90 TABLET | Refills: 1 | Status: SHIPPED | OUTPATIENT
Start: 2024-06-04

## 2024-07-16 ENCOUNTER — TELEPHONE (OUTPATIENT)
Age: 69
End: 2024-07-16

## 2024-07-16 DIAGNOSIS — E78.5 HYPERLIPIDEMIA LDL GOAL <130: ICD-10-CM

## 2024-07-16 DIAGNOSIS — I10 ESSENTIAL (PRIMARY) HYPERTENSION: ICD-10-CM

## 2024-07-16 DIAGNOSIS — G93.49 OTHER ENCEPHALOPATHY: ICD-10-CM

## 2024-07-16 DIAGNOSIS — F03.B0 MODERATE DEMENTIA WITHOUT BEHAVIORAL DISTURBANCE, PSYCHOTIC DISTURBANCE, MOOD DISTURBANCE, OR ANXIETY, UNSPECIFIED DEMENTIA TYPE (HCC): Primary | ICD-10-CM

## 2024-07-17 ENCOUNTER — TELEPHONE (OUTPATIENT)
Age: 69
End: 2024-07-17

## 2024-07-17 NOTE — TELEPHONE ENCOUNTER
Patient sister Kat called and would like for the nurse to call her about concerns of the patient.     Call back at [phone]

## 2024-07-18 RX ORDER — DONEPEZIL HYDROCHLORIDE 5 MG/1
5 TABLET, FILM COATED ORAL NIGHTLY
Qty: 30 TABLET | Refills: 3 | Status: SHIPPED | OUTPATIENT
Start: 2024-07-18
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
## 2024-08-01 PROBLEM — G93.49 OTHER ENCEPHALOPATHY: Status: ACTIVE | Noted: 2024-08-01

## 2024-08-01 PROBLEM — F03.B11 MODERATE DEMENTIA WITH AGITATION (HCC): Status: ACTIVE | Noted: 2024-08-01

## 2024-08-01 PROBLEM — F05: Status: ACTIVE | Noted: 2024-08-01

## 2024-08-01 PROBLEM — F03.918: Status: ACTIVE | Noted: 2024-08-01

## 2024-08-07 ENCOUNTER — HOSPITAL ENCOUNTER (INPATIENT)
Facility: HOSPITAL | Age: 69
LOS: 9 days | Discharge: NURSING, REHAB OR LONG-TERM CARE | End: 2024-08-16
Attending: STUDENT IN AN ORGANIZED HEALTH CARE EDUCATION/TRAINING PROGRAM | Admitting: STUDENT IN AN ORGANIZED HEALTH CARE EDUCATION/TRAINING PROGRAM
Payer: MEDICARE

## 2024-08-07 ENCOUNTER — APPOINTMENT (OUTPATIENT)
Facility: HOSPITAL | Age: 69
End: 2024-08-07
Payer: MEDICARE

## 2024-08-07 DIAGNOSIS — R41.82 ALTERED MENTAL STATUS, UNSPECIFIED ALTERED MENTAL STATUS TYPE: Primary | ICD-10-CM

## 2024-08-07 DIAGNOSIS — R41.0 CONFUSION: ICD-10-CM

## 2024-08-07 PROBLEM — F03.90 DEMENTIA ARISING IN THE SENIUM AND PRESENIUM (HCC): Status: ACTIVE | Noted: 2024-08-07

## 2024-08-07 LAB
ALBUMIN SERPL-MCNC: 4 G/DL (ref 3.5–5)
ALBUMIN/GLOB SERPL: 1.4 (ref 1.1–2.2)
ALP SERPL-CCNC: 82 U/L (ref 45–117)
ALT SERPL-CCNC: 27 U/L (ref 12–78)
AMPHET UR QL SCN: NEGATIVE
ANION GAP SERPL CALC-SCNC: 4 MMOL/L (ref 5–15)
APAP SERPL-MCNC: <2 UG/ML (ref 10–30)
APPEARANCE UR: CLEAR
AST SERPL-CCNC: 24 U/L (ref 15–37)
BACTERIA URNS QL MICRO: NEGATIVE /HPF
BARBITURATES UR QL SCN: NEGATIVE
BASOPHILS # BLD: 0 K/UL (ref 0–0.1)
BASOPHILS NFR BLD: 1 % (ref 0–1)
BENZODIAZ UR QL: NEGATIVE
BILIRUB SERPL-MCNC: 1 MG/DL (ref 0.2–1)
BILIRUB UR QL: NEGATIVE
BUN SERPL-MCNC: 18 MG/DL (ref 6–20)
BUN/CREAT SERPL: 14 (ref 12–20)
CALCIUM SERPL-MCNC: 9.6 MG/DL (ref 8.5–10.1)
CANNABINOIDS UR QL SCN: NEGATIVE
CHLORIDE SERPL-SCNC: 101 MMOL/L (ref 97–108)
CO2 SERPL-SCNC: 31 MMOL/L (ref 21–32)
COCAINE UR QL SCN: NEGATIVE
COLOR UR: ABNORMAL
COMMENT:: NORMAL
CREAT SERPL-MCNC: 1.29 MG/DL (ref 0.7–1.3)
CRP SERPL-MCNC: <0.29 MG/DL (ref 0–0.3)
DIFFERENTIAL METHOD BLD: NORMAL
EKG ATRIAL RATE: 55 BPM
EKG DIAGNOSIS: NORMAL
EKG P AXIS: 80 DEGREES
EKG P-R INTERVAL: 140 MS
EKG Q-T INTERVAL: 420 MS
EKG QRS DURATION: 88 MS
EKG QTC CALCULATION (BAZETT): 401 MS
EKG R AXIS: 71 DEGREES
EKG T AXIS: 61 DEGREES
EKG VENTRICULAR RATE: 55 BPM
EOSINOPHIL # BLD: 0 K/UL (ref 0–0.4)
EOSINOPHIL NFR BLD: 1 % (ref 0–7)
EPITH CASTS URNS QL MICRO: ABNORMAL /LPF
ERYTHROCYTE [DISTWIDTH] IN BLOOD BY AUTOMATED COUNT: 12.3 % (ref 11.5–14.5)
ERYTHROCYTE [SEDIMENTATION RATE] IN BLOOD: 2 MM/HR (ref 0–20)
ETHANOL SERPL-MCNC: <10 MG/DL (ref 0–0.08)
FOLATE SERPL-MCNC: 16.1 NG/ML (ref 5–21)
GLOBULIN SER CALC-MCNC: 2.8 G/DL (ref 2–4)
GLUCOSE SERPL-MCNC: 98 MG/DL (ref 65–100)
GLUCOSE UR STRIP.AUTO-MCNC: NEGATIVE MG/DL
HCT VFR BLD AUTO: 41.5 % (ref 36.6–50.3)
HGB BLD-MCNC: 14.8 G/DL (ref 12.1–17)
HGB UR QL STRIP: NEGATIVE
HYALINE CASTS URNS QL MICRO: ABNORMAL /LPF (ref 0–5)
IMM GRANULOCYTES # BLD AUTO: 0 K/UL (ref 0–0.04)
IMM GRANULOCYTES NFR BLD AUTO: 0 % (ref 0–0.5)
KETONES UR QL STRIP.AUTO: ABNORMAL MG/DL
LEUKOCYTE ESTERASE UR QL STRIP.AUTO: NEGATIVE
LYMPHOCYTES # BLD: 1.6 K/UL (ref 0.8–3.5)
LYMPHOCYTES NFR BLD: 24 % (ref 12–49)
Lab: NORMAL
MAGNESIUM SERPL-MCNC: 2.3 MG/DL (ref 1.6–2.4)
MCH RBC QN AUTO: 30.4 PG (ref 26–34)
MCHC RBC AUTO-ENTMCNC: 35.7 G/DL (ref 30–36.5)
MCV RBC AUTO: 85.2 FL (ref 80–99)
METHADONE UR QL: NEGATIVE
MONOCYTES # BLD: 0.5 K/UL (ref 0–1)
MONOCYTES NFR BLD: 8 % (ref 5–13)
NEUTS SEG # BLD: 4.4 K/UL (ref 1.8–8)
NEUTS SEG NFR BLD: 66 % (ref 32–75)
NITRITE UR QL STRIP.AUTO: NEGATIVE
NRBC # BLD: 0 K/UL (ref 0–0.01)
NRBC BLD-RTO: 0 PER 100 WBC
OPIATES UR QL: NEGATIVE
PCP UR QL: NEGATIVE
PH UR STRIP: 5.5 (ref 5–8)
PHOSPHATE SERPL-MCNC: 3.5 MG/DL (ref 2.6–4.7)
PLATELET # BLD AUTO: 235 K/UL (ref 150–400)
PMV BLD AUTO: 9.6 FL (ref 8.9–12.9)
POTASSIUM SERPL-SCNC: 4 MMOL/L (ref 3.5–5.1)
PROT SERPL-MCNC: 6.8 G/DL (ref 6.4–8.2)
PROT UR STRIP-MCNC: NEGATIVE MG/DL
RBC # BLD AUTO: 4.87 M/UL (ref 4.1–5.7)
RBC #/AREA URNS HPF: ABNORMAL /HPF (ref 0–5)
SALICYLATES SERPL-MCNC: <1.7 MG/DL (ref 2.8–20)
SODIUM SERPL-SCNC: 136 MMOL/L (ref 136–145)
SP GR UR REFRACTOMETRY: 1.02 (ref 1–1.03)
SPECIMEN HOLD: NORMAL
TROPONIN I SERPL HS-MCNC: 5 NG/L (ref 0–76)
TSH SERPL DL<=0.05 MIU/L-ACNC: 1.85 UIU/ML (ref 0.36–3.74)
UROBILINOGEN UR QL STRIP.AUTO: 0.2 EU/DL (ref 0.2–1)
VIT B12 SERPL-MCNC: 287 PG/ML (ref 193–986)
WBC # BLD AUTO: 6.6 K/UL (ref 4.1–11.1)
WBC URNS QL MICRO: ABNORMAL /HPF (ref 0–4)

## 2024-08-07 PROCEDURE — 84443 ASSAY THYROID STIM HORMONE: CPT

## 2024-08-07 PROCEDURE — 99285 EMERGENCY DEPT VISIT HI MDM: CPT

## 2024-08-07 PROCEDURE — 86592 SYPHILIS TEST NON-TREP QUAL: CPT

## 2024-08-07 PROCEDURE — 81001 URINALYSIS AUTO W/SCOPE: CPT

## 2024-08-07 PROCEDURE — 85652 RBC SED RATE AUTOMATED: CPT

## 2024-08-07 PROCEDURE — 6370000000 HC RX 637 (ALT 250 FOR IP): Performed by: STUDENT IN AN ORGANIZED HEALTH CARE EDUCATION/TRAINING PROGRAM

## 2024-08-07 PROCEDURE — 83735 ASSAY OF MAGNESIUM: CPT

## 2024-08-07 PROCEDURE — 1100000000 HC RM PRIVATE

## 2024-08-07 PROCEDURE — 82077 ASSAY SPEC XCP UR&BREATH IA: CPT

## 2024-08-07 PROCEDURE — 84484 ASSAY OF TROPONIN QUANT: CPT

## 2024-08-07 PROCEDURE — 86225 DNA ANTIBODY NATIVE: CPT

## 2024-08-07 PROCEDURE — 82607 VITAMIN B-12: CPT

## 2024-08-07 PROCEDURE — 80307 DRUG TEST PRSMV CHEM ANLYZR: CPT

## 2024-08-07 PROCEDURE — 80053 COMPREHEN METABOLIC PANEL: CPT

## 2024-08-07 PROCEDURE — 87389 HIV-1 AG W/HIV-1&-2 AB AG IA: CPT

## 2024-08-07 PROCEDURE — 36415 COLL VENOUS BLD VENIPUNCTURE: CPT

## 2024-08-07 PROCEDURE — 93005 ELECTROCARDIOGRAM TRACING: CPT | Performed by: PHYSICIAN ASSISTANT

## 2024-08-07 PROCEDURE — 2580000003 HC RX 258: Performed by: STUDENT IN AN ORGANIZED HEALTH CARE EDUCATION/TRAINING PROGRAM

## 2024-08-07 PROCEDURE — 86235 NUCLEAR ANTIGEN ANTIBODY: CPT

## 2024-08-07 PROCEDURE — 70450 CT HEAD/BRAIN W/O DYE: CPT

## 2024-08-07 PROCEDURE — 80143 DRUG ASSAY ACETAMINOPHEN: CPT

## 2024-08-07 PROCEDURE — 84100 ASSAY OF PHOSPHORUS: CPT

## 2024-08-07 PROCEDURE — 86140 C-REACTIVE PROTEIN: CPT

## 2024-08-07 PROCEDURE — 85025 COMPLETE CBC W/AUTO DIFF WBC: CPT

## 2024-08-07 PROCEDURE — 82746 ASSAY OF FOLIC ACID SERUM: CPT

## 2024-08-07 PROCEDURE — 80179 DRUG ASSAY SALICYLATE: CPT

## 2024-08-07 PROCEDURE — 86618 LYME DISEASE ANTIBODY: CPT

## 2024-08-07 RX ORDER — DONEPEZIL HYDROCHLORIDE 10 MG/1
5 TABLET, FILM COATED ORAL NIGHTLY
Status: DISCONTINUED | OUTPATIENT
Start: 2024-08-07 | End: 2024-08-16 | Stop reason: HOSPADM

## 2024-08-07 RX ORDER — AMLODIPINE BESYLATE AND BENAZEPRIL HYDROCHLORIDE 5; 10 MG/1; MG/1
1 CAPSULE ORAL DAILY
Status: DISCONTINUED | OUTPATIENT
Start: 2024-08-07 | End: 2024-08-07

## 2024-08-07 RX ORDER — AMLODIPINE BESYLATE 5 MG/1
5 TABLET ORAL DAILY
Status: DISCONTINUED | OUTPATIENT
Start: 2024-08-07 | End: 2024-08-16 | Stop reason: HOSPADM

## 2024-08-07 RX ORDER — LISINOPRIL 20 MG/1
10 TABLET ORAL DAILY
Status: DISCONTINUED | OUTPATIENT
Start: 2024-08-07 | End: 2024-08-16 | Stop reason: HOSPADM

## 2024-08-07 RX ORDER — ROSUVASTATIN CALCIUM 10 MG/1
10 TABLET, COATED ORAL NIGHTLY
Status: DISCONTINUED | OUTPATIENT
Start: 2024-08-07 | End: 2024-08-16 | Stop reason: HOSPADM

## 2024-08-07 RX ORDER — SODIUM CHLORIDE, SODIUM LACTATE, POTASSIUM CHLORIDE, AND CALCIUM CHLORIDE .6; .31; .03; .02 G/100ML; G/100ML; G/100ML; G/100ML
1000 INJECTION, SOLUTION INTRAVENOUS ONCE
Status: COMPLETED | OUTPATIENT
Start: 2024-08-07 | End: 2024-08-07

## 2024-08-07 RX ADMIN — SODIUM CHLORIDE, POTASSIUM CHLORIDE, SODIUM LACTATE AND CALCIUM CHLORIDE 1000 ML: 600; 310; 30; 20 INJECTION, SOLUTION INTRAVENOUS at 20:49

## 2024-08-07 RX ADMIN — AMLODIPINE BESYLATE 5 MG: 5 TABLET ORAL at 21:25

## 2024-08-07 RX ADMIN — DONEPEZIL HYDROCHLORIDE 5 MG: 10 TABLET, FILM COATED ORAL at 21:25

## 2024-08-07 RX ADMIN — ROSUVASTATIN CALCIUM 10 MG: 10 TABLET, FILM COATED ORAL at 20:50

## 2024-08-07 RX ADMIN — LISINOPRIL 10 MG: 20 TABLET ORAL at 21:25

## 2024-08-07 ASSESSMENT — PAIN - FUNCTIONAL ASSESSMENT: PAIN_FUNCTIONAL_ASSESSMENT: NONE - DENIES PAIN

## 2024-08-07 NOTE — ED TRIAGE NOTES
Patient is coming in from the PCP office for altered mental status, confusion for months and the medications counts have been off for months. Note was sent in from the office and stated that is patient reads note that can become combative. Patient states having some chest pain and intermittent shortness of breath.

## 2024-08-08 ENCOUNTER — APPOINTMENT (OUTPATIENT)
Facility: HOSPITAL | Age: 69
End: 2024-08-08
Payer: MEDICARE

## 2024-08-08 LAB
APPEARANCE UR: CLEAR
BACTERIA URNS QL MICRO: NEGATIVE /HPF
BILIRUB UR QL: NEGATIVE
COLOR UR: ABNORMAL
EPITH CASTS URNS QL MICRO: ABNORMAL /LPF
GLUCOSE UR STRIP.AUTO-MCNC: NEGATIVE MG/DL
HGB UR QL STRIP: NEGATIVE
HIV 1+2 AB+HIV1 P24 AG SERPL QL IA: NONREACTIVE
HIV 1/2 RESULT COMMENT: NORMAL
HYALINE CASTS URNS QL MICRO: ABNORMAL /LPF (ref 0–5)
KETONES UR QL STRIP.AUTO: 15 MG/DL
LEUKOCYTE ESTERASE UR QL STRIP.AUTO: NEGATIVE
NITRITE UR QL STRIP.AUTO: NEGATIVE
PH UR STRIP: 5.5 (ref 5–8)
PROT UR STRIP-MCNC: NEGATIVE MG/DL
RBC #/AREA URNS HPF: ABNORMAL /HPF (ref 0–5)
RPR SER QL: NONREACTIVE
SP GR UR REFRACTOMETRY: 1.01 (ref 1–1.03)
URINE CULTURE IF INDICATED: ABNORMAL
UROBILINOGEN UR QL STRIP.AUTO: 1 EU/DL (ref 0.2–1)
WBC URNS QL MICRO: ABNORMAL /HPF (ref 0–4)

## 2024-08-08 PROCEDURE — 6360000004 HC RX CONTRAST MEDICATION: Performed by: STUDENT IN AN ORGANIZED HEALTH CARE EDUCATION/TRAINING PROGRAM

## 2024-08-08 PROCEDURE — A9579 GAD-BASE MR CONTRAST NOS,1ML: HCPCS | Performed by: STUDENT IN AN ORGANIZED HEALTH CARE EDUCATION/TRAINING PROGRAM

## 2024-08-08 PROCEDURE — 81001 URINALYSIS AUTO W/SCOPE: CPT

## 2024-08-08 PROCEDURE — 6370000000 HC RX 637 (ALT 250 FOR IP): Performed by: STUDENT IN AN ORGANIZED HEALTH CARE EDUCATION/TRAINING PROGRAM

## 2024-08-08 PROCEDURE — 70553 MRI BRAIN STEM W/O & W/DYE: CPT

## 2024-08-08 PROCEDURE — 6370000000 HC RX 637 (ALT 250 FOR IP): Performed by: HOSPITALIST

## 2024-08-08 PROCEDURE — 1100000000 HC RM PRIVATE

## 2024-08-08 RX ORDER — LOPERAMIDE HYDROCHLORIDE 2 MG/1
2 CAPSULE ORAL 4 TIMES DAILY PRN
Status: DISCONTINUED | OUTPATIENT
Start: 2024-08-08 | End: 2024-08-16 | Stop reason: HOSPADM

## 2024-08-08 RX ADMIN — GADOTERIDOL 15 ML: 279.3 INJECTION, SOLUTION INTRAVENOUS at 15:55

## 2024-08-08 RX ADMIN — AMLODIPINE BESYLATE 5 MG: 5 TABLET ORAL at 09:06

## 2024-08-08 RX ADMIN — LOPERAMIDE HYDROCHLORIDE 2 MG: 2 CAPSULE ORAL at 19:23

## 2024-08-08 RX ADMIN — LISINOPRIL 10 MG: 20 TABLET ORAL at 09:06

## 2024-08-08 RX ADMIN — ROSUVASTATIN CALCIUM 10 MG: 10 TABLET, FILM COATED ORAL at 21:09

## 2024-08-08 RX ADMIN — DONEPEZIL HYDROCHLORIDE 5 MG: 10 TABLET, FILM COATED ORAL at 21:08

## 2024-08-08 NOTE — PROGRESS NOTES
Hospitalist Progress Note  Elva Hedrick MD  Answering service: [phone]        Date of Service:  2024  NAME:  Sid Cary  :  1955  MRN:  [id number]      Admission Summary:   Sid Cary is a 68 y.o. male with pmh of htn, gout, skin cancer who presented to ed with family for concerns of worsening memory and confusion.  Pt is seen and examined at bedside.  He provides limited history.  Patient states he feels fine and is unsure of what concerns his sister and mother staff about his memory.  He states he is intermittently forgetful but it is not abnormal.  States he is still active drives unable to take care of himself.  He continually insist during our conversation that many people have been questioning presented behind his back and this has been creating some distress for him.  He denies any falls or head injury.  No previous history of CVA.  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  He would intermittently confused me for one of his coworkers was calling to and occasionally tell me that we were on campus.  Per chart review, patient's sister has had concerns for worsening memory and intermittent episodes of aggression towards his neighbors.  He has been seen by his PCP and was sent to ED to expedite his workup.  The patient denies any fever, chills, chest or abdominal pain, nausea, vomiting, cough, congestion, recent illness, palpitations, or dysuria.          Interval history / Subjective:   No acute event  Per sister progressive declining of mental status over last months: having hallucinations-paranoid thoughts--people stealing from him, sees people talking behind him      Assessment & Plan:      AMS  -may due to progressive dementia  -need rule out secondary cause given his rapid declining last month  -head CT neg   Pending brain MRI  CPR neg    ESR 2  normal   KITA, lyme test , RPR pending   B12, folate, TSH normal   Neurologist consult     Paranoid/hallucination  -psych consult       Hypertension  -Continue home amlodipine/lisinopril     Dyslipidemia  -PTA Crestor     Code status: full   Prophylaxis: sc lovenox   Care Plan discussed with: pt, sister, RN, CM.   Anticipated Disposition: TBD, not safe to return home, no children , may consider [facility] vs SNF, pt eval   Central Line:                Review of Systems:   Pertinent items are noted in HPI.         Vital Signs:    Last 24hrs VS reviewed since prior progress note. Most recent are:  Vitals:    08/08/24 1304   BP: 125/69   Pulse: 62   Resp: 17   Temp: 97.7 °F (36.5 °C)   SpO2: 99%         Intake/Output Summary (Last 24 hours) at 8/8/2024 1532  Last data filed at 8/7/2024 2231  Gross per 24 hour   Intake 1000 ml   Output --   Net 1000 ml        Physical Examination:     I had a face to face encounter with this patient and independently examined them on 8/8/2024 as outlined below:          General : alert x name, place , awake, no acute distress,   HEENT: PEERL, EOMI, moist mucus membrane, TM clear  Neck: supple, no JVD, no meningeal signs  Chest: Clear to auscultation bilaterally   CVS: S1 S2 heard, Capillary refill less than 2 seconds  Abd: soft/ non tender, non distended, BS physiological,   Ext: no clubbing, no cyanosis, no edema, brisk 2+ DP pulses  Neuro/Psych: pleasant mood and affect, CN 2-12 grossly intact, sensory grossly within normal limit, Strength 5/5 in all extremities, DTR 1+ x 4  Skin: warm            Data Review:    Review and/or order of clinical lab test    I have independently reviewed and interpreted patient's lab and all other diagnostic data    Notes reviewed from all clinical/nonclinical/nursing services involved in patient's clinical care. Care coordination discussions were held with appropriate clinical/nonclinical/ nursing providers based on care coordination needs.     Labs:     Recent

## 2024-08-08 NOTE — PLAN OF CARE
Problem: Safety - Adult  Goal: Free from fall injury  8/8/2024 1238 by Daysi Cardona, RN  Outcome: Adequate for Discharge  8/8/2024 0459 by Marita Roman RN  Outcome: Not Progressing     Problem: Chronic Conditions and Co-morbidities  Goal: Patient's chronic conditions and co-morbidity symptoms are monitored and maintained or improved  8/8/2024 1238 by Daysi Cardona, RN  Outcome: Progressing  8/8/2024 0459 by Marita Roman RN  Outcome: Not Progressing

## 2024-08-08 NOTE — PROGRESS NOTES
Comprehensive Nutrition Assessment    Type and Reason for Visit: Initial, Positive Nutrition Screen    Nutrition Recommendations/Plan:   Continue regular diet   Ensure Plus HP 2x/day - chocolate   Please document % intake of meals in flowsheets        Malnutrition Assessment:  Malnutrition Status:  Moderate malnutrition (08/08/24 1336)    Context:  Social/Environmental Circumstances     Findings of the 6 clinical characteristics of malnutrition:  Energy Intake:  Mild decrease in energy intake (Comment)  Weight Loss:  Greater than 7.5% over 3 months     Body Fat Loss:  No significant body fat loss     Muscle Mass Loss:  Mild muscle mass loss Temples (temporalis), Clavicles (pectoralis & deltoids)  Fluid Accumulation:  No significant fluid accumulation     Strength:  Not Performed       Nutrition Assessment:    PMH of HTN, gout, skin cancer, NKA.     Presents w/ worsening memory and confusion, likely progressive dementia per H&P. Head CT unremarkable, pending MRI.     Chart reviewed for MST 2. Consider accuracy of pt reported information given dementia. Pt reports eating some breakfast this AM (confirmed by sitter, carter and toast) however, intakes usually much greater PTA. Meal preferences obtained/recorded. ~11% wt loss x 5 months per EMR. Pt amenable to try ONS to better meet estimated needs, prefers chocolate. No reported nausea/vomiting/diarrhea/constipation. RD monitor oral intakes/ONS acceptance at f/u.     Nutritionally Significant Medications:  Crestor      Estimated Daily Nutrient Needs:  Energy Requirements Based On: Kcal/kg  Weight Used for Energy Requirements: Current  Energy (kcal/day): 1748 - 2097 kcal/day (25 - 30 kcal/kg)  Weight Used for Protein Requirements: Current  Protein (g/day): 84 g/day (1.2 g/kg)  Method Used for Fluid Requirements: 1 ml/kcal  Fluid (ml/day): 1748 - 2097 ml/day (1ml/kcal)    Nutrition Related Findings:   Edema: None                    Recent Labs     08/07/24  1233

## 2024-08-08 NOTE — ED PROVIDER NOTES
[facility] EMERGENCY DEP  EMERGENCY DEPARTMENT ENCOUNTER      Pt Name: Sid Cary  MRN: [id number]  Birthdate 1955  Date of evaluation: 8/7/2024  Provider: Chacho Hardin MD    CHIEF COMPLAINT       Chief Complaint   Patient presents with    Altered Mental Status         HISTORY OF PRESENT ILLNESS   (Location/Symptom, Timing/Onset, Context/Setting, Quality, Duration, Modifying Factors, Severity)  Note limiting factors.   Patient is a 68-year-old male presenting to the emergency department with his sister for concerns and changes of mental status.  Patient provides letter documenting recent changes see scanned media in this note where patient has had a recent rapid decline with confusion, memory and mental status.  Patient's physician Dr. Hodge as well as family feel that patient is no longer safe to be living at home by himself due to recent changes in his mental status.  Patient denies any chest pain, shortness of breath dizziness, lightheadedness or recent falls.  On chart review Dr. Hodge had ordered outpatient CT imaging for changes in mental status.  Sister also states that it appears that his pill counts are off and they are unsure if he is actually taking his medications as prescribed.            Review of External Medical Records:     Nursing Notes were reviewed.    REVIEW OF SYSTEMS    (2-9 systems for level 4, 10 or more for level 5)     Review of Systems    Except as noted above the remainder of the review of systems was reviewed and negative.       PAST MEDICAL HISTORY     Past Medical History:   Diagnosis Date    Acute idiopathic gout involving toe of left foot 09/18/2018    Closed blow-out fracture of left orbital floor (HCC) 08/19/2021    Essential hypertension, benign 03/15/2015    Gout 09/26/2018    Skin cancer     2 BCC's    Sun-damaged skin     Vitamin D deficiency 02/11/2018         SURGICAL HISTORY       Past Surgical History:   Procedure Laterality Date    CATARACT EXTRACTION Bilateral  22.11 kg/m².    Physical Exam  Vitals and nursing note reviewed.   Constitutional:       Appearance: Normal appearance.   HENT:      Head: Normocephalic and atraumatic.   Eyes:      Extraocular Movements: Extraocular movements intact.      Pupils: Pupils are equal, round, and reactive to light.   Cardiovascular:      Rate and Rhythm: Normal rate and regular rhythm.      Pulses: Normal pulses.      Heart sounds: Normal heart sounds.   Pulmonary:      Effort: Pulmonary effort is normal.      Breath sounds: Normal breath sounds.   Abdominal:      General: Abdomen is flat.      Palpations: Abdomen is soft.   Musculoskeletal:         General: Normal range of motion.      Cervical back: Normal range of motion and neck supple.   Skin:     General: Skin is warm and dry.   Neurological:      General: No focal deficit present.      Mental Status: He is alert and oriented to person, place, and time. He is confused.      GCS: GCS eye subscore is 4. GCS verbal subscore is 5. GCS motor subscore is 6.   Psychiatric:         Mood and Affect: Mood normal.         Behavior: Behavior normal.         Cognition and Memory: Memory is impaired. He exhibits impaired recent memory and impaired remote memory.      Comments: Patient unable to recall the month or current place he does know his name and the year.         DIAGNOSTIC RESULTS     EKG: All EKG's are interpreted by the Emergency Department Physician who either signs or Co-signs this chart in the absence of a cardiologist.        RADIOLOGY:   Non-plain film images such as CT, Ultrasound and MRI are read by the radiologist. Plain radiographic images are visualized and preliminarily interpreted by the emergency physician with the below findings:        Interpretation per the Radiologist below, if available at the time of this note:    CT HEAD WO CONTRAST    (Results Pending)        LABS:  Labs Reviewed   COMPREHENSIVE METABOLIC PANEL - Abnormal; Notable for the following components:

## 2024-08-08 NOTE — CARE COORDINATION
Care Management Initial Assessment       RUR: 6%  Readmission? No  1st IM letter given? Yes - 08/08/24  Disposition: SNF (gave list 8-8-24, await choices)    Patient lives alone. Patient with history of dementia that is increasing. Patient with increasing inability to live alone.     Attempted to meet with patient. He was being wheeled out of the room to go to a test.     Called sister Kat Cary [phone]. She confirmed information on face sheet. Patient lives alone in a 2 story home with a basement. His bedrooms are on the 2nd floor. Patient has 4 steps to enter his home. Patient was independent with his ADLs. He has not been taking his medications properly. Sister has neuropathy and has good and bad days. She is not always able to go over and help patient. Patient is not , has no adult children and no living parent. His sister Kat Cary [phone] is his emergency contact. Patient has not been eating properly. He is doing his own bathing and dressing. Patient uses [facility] ([phone]).     Sister said she spoke with hospitalist. She said MD told her that patient is awaiting her MRI today, neurology consult, and psychiatry consult. She said MD told her patient cannot be alone. The family would like to look at assisted living facilities for patient. She has a number for [facility] (a  agency) and  gave her number for [facility]. Explained therapy recommending SNF. This would give them an opportunity to find [facility]. Sister in agreement. Will email her a copy of the SNF list now to [email or web address].     Went over the IMM with sister. She gave verbal consent. Emailed her a copy.         08/08/24 8813   Service Assessment   Patient Orientation Unable to Assess  (Patient not in room.)   Cognition Dementia / Early Alzheimer's   History Provided By Child/Family   Primary Caregiver Self   Support Systems Family Members  (sister Kat Cary  that supports the patient's individualized plan of care/goals, treatment preferences, and shares the quality data associated with the providers?  Yes     MIRLANDE ARREAGA

## 2024-08-08 NOTE — CARE COORDINATION
Care Management - Called patient's emergency contact Kat Cary [phone]. Left voicemail for her to return call to this CM today or unit CM tomorrow.     MIRLANDE ARREAGA

## 2024-08-08 NOTE — PLAN OF CARE
Problem: Safety - Adult  Goal: Free from fall injury  Outcome: Not Progressing     Problem: Chronic Conditions and Co-morbidities  Goal: Patient's chronic conditions and co-morbidity symptoms are monitored and maintained or improved  Outcome: Not Progressing

## 2024-08-08 NOTE — H&P
History & Physical    Primary Care Provider: Jason Hodge MD  Source of Information: Patient and chart review    History of Presenting Illness:   Sid Cary is a 68 y.o. male with pmh of htn, gout, skin cancer who presented to ed with family for concerns of worsening memory and confusion.  Pt is seen and examined at bedside.  He provides limited history.  Patient states he feels fine and is unsure of what concerns his sister and mother staff about his memory.  He states he is intermittently forgetful but it is not abnormal.  States he is still active drives unable to take care of himself.  He continually insist during our conversation that many people have been questioning presented behind his back and this has been creating some distress for him.  He denies any falls or head injury.  No previous history of CVA.  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  He would intermittently confused me for one of his coworkers was calling to and occasionally tell me that we were on campus.  Per chart review, patient's sister has had concerns for worsening memory and intermittent episodes of aggression towards his neighbors.  He has been seen by his PCP and was sent to ED to expedite his workup.  The patient denies any fever, chills, chest or abdominal pain, nausea, vomiting, cough, congestion, recent illness, palpitations, or dysuria.    Remarkable vitals on ER Presentation: vss  Labs Remarkable for: unremarkable  ER Images: none  ER Rx: none     Review of Systems:  Pertinent items are noted in the History of Present Illness.     Past Medical History:   Diagnosis Date    Acute idiopathic gout involving toe of left foot 09/18/2018    Closed blow-out fracture of left orbital floor (HCC) 08/19/2021    Essential hypertension, benign 03/15/2015    Gout 09/26/2018    Skin cancer     2 BCC's    Sun-damaged skin     Vitamin D deficiency 02/11/2018      Past Surgical History:

## 2024-08-08 NOTE — ED NOTES
12:19 PM    Patient brought to ED due to confusion recently, worse in past 2 months, noted to have pill count off  with all medications.  He has HTN, HLD, dementia and can become combative.             I have evaluated the patient as the Provider in Rapid Medical Evaluation (RME). I have reviewed his vital signs and the triage nurse assessment. I have talked with the patient and any available family and advised that I am the provider in triage and have ordered the appropriate study to initiate their work up based on the clinical presentation during my assessment. I have advised that the patient will be accommodated in the Main ED as soon as possible. I have also requested to contact the triage nurse or myself immediately if the patient experiences any changes in their condition during this brief waiting period.  QUAN Calles, Lilliana BORGES PA-C  08/07/24 0772    
Lab called for add-on and send out information for newly ordered labs.  Extra tubes that were requested sent by RN.    
Patient redirected   
Pt's sister left for the night, reports she took his personal belongings home but left pt his phone and shoes.  
RN to bedside, initial pt contact.  Pt confused, thinks he is staying at a hotel.  Pt re-oriented to situation and place.  Pt instructed not to get out of bed without assistance.  Pillow provided for comfort and call bell within reach with associated teaching.   
Notification  Name of person notified and time: Estrella Cintron8      Electronically signed by: Electronically signed by Birgit Gonzalez RN on 8/7/2024 at 10:31 PM

## 2024-08-09 LAB
ALBUMIN SERPL-MCNC: 4 G/DL (ref 3.5–5)
ALBUMIN/GLOB SERPL: 1.7 (ref 1.1–2.2)
ALP SERPL-CCNC: 82 U/L (ref 45–117)
ALT SERPL-CCNC: 26 U/L (ref 12–78)
ANION GAP SERPL CALC-SCNC: 4 MMOL/L (ref 5–15)
AST SERPL-CCNC: 25 U/L (ref 15–37)
BASOPHILS # BLD: 0 K/UL (ref 0–0.1)
BASOPHILS NFR BLD: 0 % (ref 0–1)
BILIRUB SERPL-MCNC: 1 MG/DL (ref 0.2–1)
BUN SERPL-MCNC: 15 MG/DL (ref 6–20)
BUN/CREAT SERPL: 13 (ref 12–20)
CALCIUM SERPL-MCNC: 10.1 MG/DL (ref 8.5–10.1)
CENTROMERE B AB SER-ACNC: <0.2 AI (ref 0–0.9)
CHLORIDE SERPL-SCNC: 105 MMOL/L (ref 97–108)
CHROMATIN AB SERPL-ACNC: <0.2 AI (ref 0–0.9)
CO2 SERPL-SCNC: 29 MMOL/L (ref 21–32)
CREAT SERPL-MCNC: 1.16 MG/DL (ref 0.7–1.3)
DIFFERENTIAL METHOD BLD: NORMAL
DSDNA AB SER-ACNC: <1 IU/ML (ref 0–9)
ENA JO1 AB SER-ACNC: <0.2 AI (ref 0–0.9)
ENA RNP AB SER-ACNC: <0.2 AI (ref 0–0.9)
ENA SCL70 AB SER-ACNC: <0.2 AI (ref 0–0.9)
ENA SM AB SER-ACNC: <0.2 AI (ref 0–0.9)
ENA SS-A AB SER-ACNC: <0.2 AI (ref 0–0.9)
ENA SS-B AB SER-ACNC: <0.2 AI (ref 0–0.9)
EOSINOPHIL # BLD: 0.1 K/UL (ref 0–0.4)
EOSINOPHIL NFR BLD: 1 % (ref 0–7)
ERYTHROCYTE [DISTWIDTH] IN BLOOD BY AUTOMATED COUNT: 12.1 % (ref 11.5–14.5)
GLOBULIN SER CALC-MCNC: 2.4 G/DL (ref 2–4)
GLUCOSE BLD STRIP.AUTO-MCNC: 101 MG/DL (ref 65–117)
GLUCOSE SERPL-MCNC: 103 MG/DL (ref 65–100)
HCT VFR BLD AUTO: 44.6 % (ref 36.6–50.3)
HGB BLD-MCNC: 15.2 G/DL (ref 12.1–17)
IMM GRANULOCYTES # BLD AUTO: 0 K/UL (ref 0–0.04)
IMM GRANULOCYTES NFR BLD AUTO: 0 % (ref 0–0.5)
LYME ANTIBODY: NEGATIVE
LYMPHOCYTES # BLD: 2 K/UL (ref 0.8–3.5)
LYMPHOCYTES NFR BLD: 30 % (ref 12–49)
Lab: NORMAL
MAGNESIUM SERPL-MCNC: 2.4 MG/DL (ref 1.6–2.4)
MCH RBC QN AUTO: 29.5 PG (ref 26–34)
MCHC RBC AUTO-ENTMCNC: 34.1 G/DL (ref 30–36.5)
MCV RBC AUTO: 86.4 FL (ref 80–99)
MONOCYTES # BLD: 0.6 K/UL (ref 0–1)
MONOCYTES NFR BLD: 9 % (ref 5–13)
NEUTS SEG # BLD: 4 K/UL (ref 1.8–8)
NEUTS SEG NFR BLD: 60 % (ref 32–75)
NRBC # BLD: 0 K/UL (ref 0–0.01)
NRBC BLD-RTO: 0 PER 100 WBC
PHOSPHATE SERPL-MCNC: 3 MG/DL (ref 2.6–4.7)
PLATELET # BLD AUTO: 235 K/UL (ref 150–400)
PMV BLD AUTO: 9.8 FL (ref 8.9–12.9)
POTASSIUM SERPL-SCNC: 4.2 MMOL/L (ref 3.5–5.1)
PROT SERPL-MCNC: 6.4 G/DL (ref 6.4–8.2)
RBC # BLD AUTO: 5.16 M/UL (ref 4.1–5.7)
SERVICE CMNT-IMP: NORMAL
SODIUM SERPL-SCNC: 138 MMOL/L (ref 136–145)
WBC # BLD AUTO: 6.8 K/UL (ref 4.1–11.1)

## 2024-08-09 PROCEDURE — 97530 THERAPEUTIC ACTIVITIES: CPT

## 2024-08-09 PROCEDURE — 84100 ASSAY OF PHOSPHORUS: CPT

## 2024-08-09 PROCEDURE — 6370000000 HC RX 637 (ALT 250 FOR IP): Performed by: STUDENT IN AN ORGANIZED HEALTH CARE EDUCATION/TRAINING PROGRAM

## 2024-08-09 PROCEDURE — 36415 COLL VENOUS BLD VENIPUNCTURE: CPT

## 2024-08-09 PROCEDURE — 1100000000 HC RM PRIVATE

## 2024-08-09 PROCEDURE — 6370000000 HC RX 637 (ALT 250 FOR IP): Performed by: NURSE PRACTITIONER

## 2024-08-09 PROCEDURE — 97165 OT EVAL LOW COMPLEX 30 MIN: CPT

## 2024-08-09 PROCEDURE — 2580000003 HC RX 258

## 2024-08-09 PROCEDURE — 6370000000 HC RX 637 (ALT 250 FOR IP): Performed by: HOSPITALIST

## 2024-08-09 PROCEDURE — 83735 ASSAY OF MAGNESIUM: CPT

## 2024-08-09 PROCEDURE — 80053 COMPREHEN METABOLIC PANEL: CPT

## 2024-08-09 PROCEDURE — 82962 GLUCOSE BLOOD TEST: CPT

## 2024-08-09 PROCEDURE — 85025 COMPLETE CBC W/AUTO DIFF WBC: CPT

## 2024-08-09 PROCEDURE — 99223 1ST HOSP IP/OBS HIGH 75: CPT | Performed by: NURSE PRACTITIONER

## 2024-08-09 PROCEDURE — 6360000002 HC RX W HCPCS

## 2024-08-09 RX ORDER — HYDROXYZINE HYDROCHLORIDE 10 MG/1
25 TABLET, FILM COATED ORAL 3 TIMES DAILY PRN
Status: DISCONTINUED | OUTPATIENT
Start: 2024-08-09 | End: 2024-08-16 | Stop reason: HOSPADM

## 2024-08-09 RX ORDER — OLANZAPINE 5 MG/1
5 TABLET, ORALLY DISINTEGRATING ORAL NIGHTLY
Status: DISCONTINUED | OUTPATIENT
Start: 2024-08-09 | End: 2024-08-16 | Stop reason: HOSPADM

## 2024-08-09 RX ORDER — OLANZAPINE 5 MG/1
2.5 TABLET, ORALLY DISINTEGRATING ORAL NIGHTLY
Status: DISCONTINUED | OUTPATIENT
Start: 2024-08-09 | End: 2024-08-09

## 2024-08-09 RX ORDER — MIRTAZAPINE 15 MG/1
15 TABLET, FILM COATED ORAL NIGHTLY
Status: DISCONTINUED | OUTPATIENT
Start: 2024-08-09 | End: 2024-08-16 | Stop reason: HOSPADM

## 2024-08-09 RX ORDER — TRAZODONE HYDROCHLORIDE 50 MG/1
25 TABLET ORAL EVERY 4 HOURS PRN
Status: DISCONTINUED | OUTPATIENT
Start: 2024-08-09 | End: 2024-08-16 | Stop reason: HOSPADM

## 2024-08-09 RX ADMIN — OLANZAPINE 5 MG: 5 TABLET, ORALLY DISINTEGRATING ORAL at 22:09

## 2024-08-09 RX ADMIN — DONEPEZIL HYDROCHLORIDE 5 MG: 10 TABLET, FILM COATED ORAL at 22:09

## 2024-08-09 RX ADMIN — TRAZODONE HYDROCHLORIDE 25 MG: 50 TABLET ORAL at 22:09

## 2024-08-09 RX ADMIN — WATER 2.5 MG: 1 INJECTION INTRAMUSCULAR; INTRAVENOUS; SUBCUTANEOUS at 03:22

## 2024-08-09 RX ADMIN — MIRTAZAPINE 15 MG: 15 TABLET, FILM COATED ORAL at 22:10

## 2024-08-09 RX ADMIN — ROSUVASTATIN CALCIUM 10 MG: 10 TABLET, FILM COATED ORAL at 22:08

## 2024-08-09 RX ADMIN — AMLODIPINE BESYLATE 5 MG: 5 TABLET ORAL at 09:52

## 2024-08-09 RX ADMIN — LISINOPRIL 10 MG: 20 TABLET ORAL at 09:51

## 2024-08-09 NOTE — BSMART NOTE
involved with the treatment. The patient has not been in an event described as horrible or outside the realm of ordinary life experience either currently or in the past. The patient has not been a victim of sexual/physical abuse.    Section VII - Other Areas of Clinical Concern    The highest grade achieved is NA with the overall quality of school experience being described as NA. The patient is currently retired and speaks English as a primary language.  The patient has no communication impairments affecting communication. The patient's preference for learning can be described as: can read and write adequately.  The patient's hearing is hard of hearing.  The patient's vision is impaired and  wears glasses or contacts.      Mari Fitzpatrick LCSW  [employer] Liaison  Available on [employer] as Kat Fitzpatrick

## 2024-08-09 NOTE — PROGRESS NOTES
Recived Pt awake,at 1940pm,Pt was very anxious and stated he was not sure why he was here,he said he s here because something is wrong with his head,Pt states Im so afraid  because I dont know what's happening to me,Pt has a one to one sitter at the bedside,I sat with my Pt after getting report for about 40 minutes,he kept saying he had to go home,,,pt reused to cooperate ,this all happened around 2230 pm,he was cursing and stated the Tech had to get out of his house,I explained that he was in the hospital ,then the Pt Became tearful and asked me why,He said I'm so afraid of what's going on,its something in my head,after saying he would cooperate ,Pt became very agitated agin,[employer] [place] had to be called,Security came,Pt did calm down I stayed with him and charted in the room,for 1/2 hour,then after another hour Pt became very agitated again,Rosalina DIALLO was notified by me that Pts HR had dropped to 44,Pts HR had been [place] peridiocally throughout the Day,EKG was ordered for this pt,He refused was very agitated and removed his Tele box and his chest electrodes,he was very agitated.The entire staff on night was so supportive and offered assistance to this Pt Frequently,he did  get combatative several times,He would continually state how he was afraid,he would day what's happening to me,Pt was mointered very closely throughout the night,hourly cjeckes even though we had a one to one sitter outside the door,Pt became very agitated with anyone sitting in the room,Report endorsed to John GREY

## 2024-08-09 NOTE — CONSULTS
NEUROLOGY CONSULT NOTE    Name Sid Cary Age 68 y.o.   MRN [id number]  1955     Consulting Physician: Elva Hedrick MD      Chief Complaint:  mental status decline      Assessment/Plan:     Principal Problem:    Dementia arising in the senium and presenium (HCC)  Active Problems:    AMS (altered mental status)  Resolved Problems:    * No resolved hospital problems. *    Patient is a 68 year-old male with history of HTN, gout and skin cancer who presented to the ED on 24 for family concerns of worsening memory and confusion. Through conversation with patient he is aware that he has had changes to his personality and that other family and friends have voiced their concerns. He is very tangential in his speech and constantly needing to be redirected. He also asks several times what the original question was after he is talking. He has no focal deficits, gait problems or ataxia. He was agitated and combative last night with care team staff. A scanned letter in the chart refers to concerns from his PCP but states confusion started over the past few months. Initial ED encounter states symptoms states symptoms have been worse \"in past 2 months\". MRI brain (24) with chronic microvascular ischemic changes and mild to moderate temporal predominant cerebral atrophy. UDS/UA negative, WBC 6.8, B12/folate normal, ESR normal, Lyme panel negative, KITA negative  Likely progression of baseline underlying dementia. Called sister, Kat, to get further clarification of time course and any other symptoms, but she did not answer. Symptoms seem to have been noted over the last 2 months at least from documentation. No indication for LP at this time since symptoms do not seem to be recently acute.       Recommendations:   - Agree with Psychiatric evaluation  - Will request outpatient Neurology follow-up and Neuropsych testing   - Patient should no longer drive and not safe to live alone . CM working on LTC versus  hypodensity consistent with  chronic small vessel ischemic disease. There is no intracranial hemorrhage,  extra-axial collection, or mass effect. The basilar cisterns are open. No CT  evidence of acute infarct.    The bone windows demonstrate no abnormalities. The visualized portions of the  paranasal sinuses and mastoid air cells are clear.    Impression  No evidence of acute intracranial abnormality.      Electronically signed by KODY OKEEFE      MRI Results (maximum last 3):  MRI Result (most recent):  MRI BRAIN W WO CONTRAST 08/08/2024    Narrative  EXAM:  MRI BRAIN W WO CONTRAST    Clinical history: Dementia  INDICATION:   Dementia    COMPARISON: None    TECHNIQUE: MR examination of the brain includes axial and sagittal T1 , axial  T2, axial FLAIR, axial gradient echo, axial DWI, coronal T1 . Pre and post  contrast axial T1-weighted imaging. Postcontrast T1-weighted imaging coronal  plane.    CONTRAST: ProHance  FINDINGS:  There is no intracranial mass, hemorrhage or acute infarction.  There are confluent periventricular and scattered foci of increased T2 signal  intensity demonstrated in the corona radiata and the centrum semiovale. There is  mild sulcal and ventricular prominence.  Pontomesencephalic ratio is within normal limits. IACs are symmetric.  Otherwise;  There is no abnormal parenchymal enhancement. There is no abnormal meningeal  enhancement demonstrated. The brain architecture is normal. There is no evidence  of midline shift or mass-effect. The ventricles are normal in size, position and  configuration.  There are no extra-axial fluid collections. Major intracranial  vascular flow-voids are unremarkable. The orbits are grossly symmetric. Dural  venous sinuses are grossly unremarkable. There is no Chiari or syrinx. Pituitary  and infundibulum grossly unremarkable. Cerebellopontine angles are unremarkable.  No skull base mass is identified. Cavernous sinuses are symmetric. The mastoid  air cells and
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
## 2024-08-09 NOTE — PROGRESS NOTES
Physical Therapy  Orders received and chart reviewed. Observed patient walking the halls with PCT.  He has a steady gait and no obvious balance issues.  Spoke with OT who met with patient and no mobility concerns.  At this time, no skilled PT needs indicated and will sign off.  TAMRA BASILIO, PT
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
## 2024-08-10 PROCEDURE — 6370000000 HC RX 637 (ALT 250 FOR IP): Performed by: STUDENT IN AN ORGANIZED HEALTH CARE EDUCATION/TRAINING PROGRAM

## 2024-08-10 PROCEDURE — 6370000000 HC RX 637 (ALT 250 FOR IP): Performed by: NURSE PRACTITIONER

## 2024-08-10 PROCEDURE — 1100000000 HC RM PRIVATE

## 2024-08-10 PROCEDURE — 6370000000 HC RX 637 (ALT 250 FOR IP): Performed by: HOSPITALIST

## 2024-08-10 RX ORDER — DULOXETIN HYDROCHLORIDE 30 MG/1
30 CAPSULE, DELAYED RELEASE ORAL DAILY
Status: DISCONTINUED | OUTPATIENT
Start: 2024-08-10 | End: 2024-08-16 | Stop reason: HOSPADM

## 2024-08-10 RX ORDER — POLYETHYLENE GLYCOL 3350 17 G/17G
17 POWDER, FOR SOLUTION ORAL DAILY
Status: DISCONTINUED | OUTPATIENT
Start: 2024-08-10 | End: 2024-08-16 | Stop reason: HOSPADM

## 2024-08-10 RX ADMIN — ROSUVASTATIN CALCIUM 10 MG: 10 TABLET, FILM COATED ORAL at 21:46

## 2024-08-10 RX ADMIN — OLANZAPINE 5 MG: 5 TABLET, ORALLY DISINTEGRATING ORAL at 21:46

## 2024-08-10 RX ADMIN — DONEPEZIL HYDROCHLORIDE 5 MG: 10 TABLET, FILM COATED ORAL at 21:46

## 2024-08-10 RX ADMIN — DULOXETINE HYDROCHLORIDE 30 MG: 30 CAPSULE, DELAYED RELEASE ORAL at 10:11

## 2024-08-10 RX ADMIN — AMLODIPINE BESYLATE 5 MG: 5 TABLET ORAL at 10:11

## 2024-08-10 RX ADMIN — TRAZODONE HYDROCHLORIDE 25 MG: 50 TABLET ORAL at 21:46

## 2024-08-10 RX ADMIN — LISINOPRIL 10 MG: 20 TABLET ORAL at 10:11

## 2024-08-10 RX ADMIN — MIRTAZAPINE 15 MG: 15 TABLET, FILM COATED ORAL at 21:46

## 2024-08-10 ASSESSMENT — PAIN SCALES - GENERAL
PAINLEVEL_OUTOF10: 0
PAINLEVEL_OUTOF10: 0

## 2024-08-10 NOTE — CONSULTS
[facility]  PSYCHIATRY CONSULT NOTE:    Name: Sid Cary  MR#: [id number]  : 1955  ACCOUNT#: [id number]  ADMIT DATE: 2024    REASON FOR CONSULT: AMS    PATIENT SEEN VIRTUALLY WITH [employer] liaison Mari Fitzpatrick coordinating visit and present at bedside      HISTORY OF PRESENTING COMPLAINT:  Sid Cary is a 68 y.o. male admitted to [facility] for medical care and review of worsening altered mental status more acute over the last 2 months with a change in personality and aggression.  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. He did have a significant TBI in 2021 when he fell down a flight of stairs.    Recently, his family and friends have noticed his memory progressively worsening but a more rapid pace.  He has had an MRI this month 2024 showing chronic microvascular ischemic changes and mild to moderate temporal predominant cerebral atrophy.  Additional workup for other possible etiology pertaining to more acute change in memory all within normal limits (Test done include: UDA/UDS, WBC, B12/folate, ESR, Lyme panel, KITA done).  From reviewing notes and talking with Fritz Cary, he has not been complaining or recently treated for any acute medical ailment like UTI, URI, or other physical ailments and basic electrolyte and chemistry and more specialized labs for possible acute change in memory  have consisently been unremarkable.    He is pleasant, calm, and engaged during my virtual visit today with [employer]  Mari Fitzpatrick present at his bedside. He is tangential in his conversation and is redirectable but can become more difficult making this interview semi productive. He is able to describe in detail the event of his fall in . Once I bring that up, he is unable to move past the topic.   He has some delusional thinking as well believing we are a t work and he has had to catch a ride to and
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
## 2024-08-10 NOTE — PLAN OF CARE
Problem: Safety - Adult  Goal: Free from fall injury  Outcome: Progressing     Problem: Discharge Planning  Goal: Discharge to home or other facility with appropriate resources  Outcome: Progressing     Problem: Chronic Conditions and Co-morbidities  Goal: Patient's chronic conditions and co-morbidity symptoms are monitored and maintained or improved  Outcome: Progressing     Problem: Nutrition Deficit:  Goal: Optimize nutritional status  Outcome: Progressing     Problem: Safety - Medical Restraint  Goal: Remains free of injury from restraints (Restraint for Interference with Medical Device)  Description: INTERVENTIONS:  1. Determine that other, less restrictive measures have been tried or would not be effective before applying the restraint  2. Evaluate the patient's condition at the time of restraint application  3. Inform patient/family regarding the reason for restraint  4. Q2H: Monitor safety, psychosocial status, comfort, nutrition and hydration  Outcome: Progressing     Problem: Pain  Goal: Verbalizes/displays adequate comfort level or baseline comfort level  Outcome: Progressing
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
## 2024-08-11 PROCEDURE — 6370000000 HC RX 637 (ALT 250 FOR IP): Performed by: STUDENT IN AN ORGANIZED HEALTH CARE EDUCATION/TRAINING PROGRAM

## 2024-08-11 PROCEDURE — 6370000000 HC RX 637 (ALT 250 FOR IP): Performed by: HOSPITALIST

## 2024-08-11 PROCEDURE — 1100000000 HC RM PRIVATE

## 2024-08-11 PROCEDURE — 6370000000 HC RX 637 (ALT 250 FOR IP): Performed by: NURSE PRACTITIONER

## 2024-08-11 RX ORDER — CALCIUM CARBONATE 500 MG/1
500 TABLET, CHEWABLE ORAL 3 TIMES DAILY PRN
Status: DISCONTINUED | OUTPATIENT
Start: 2024-08-11 | End: 2024-08-16 | Stop reason: HOSPADM

## 2024-08-11 RX ADMIN — DONEPEZIL HYDROCHLORIDE 5 MG: 10 TABLET, FILM COATED ORAL at 22:18

## 2024-08-11 RX ADMIN — DULOXETINE HYDROCHLORIDE 30 MG: 30 CAPSULE, DELAYED RELEASE ORAL at 08:24

## 2024-08-11 RX ADMIN — MIRTAZAPINE 15 MG: 15 TABLET, FILM COATED ORAL at 22:19

## 2024-08-11 RX ADMIN — CALCIUM CARBONATE (ANTACID) CHEW TAB 500 MG 500 MG: 500 CHEW TAB at 11:42

## 2024-08-11 RX ADMIN — OLANZAPINE 5 MG: 5 TABLET, ORALLY DISINTEGRATING ORAL at 22:19

## 2024-08-11 RX ADMIN — LISINOPRIL 10 MG: 20 TABLET ORAL at 08:24

## 2024-08-11 RX ADMIN — AMLODIPINE BESYLATE 5 MG: 5 TABLET ORAL at 08:24

## 2024-08-11 RX ADMIN — ROSUVASTATIN CALCIUM 10 MG: 10 TABLET, FILM COATED ORAL at 22:19

## 2024-08-11 ASSESSMENT — PAIN SCALES - GENERAL
PAINLEVEL_OUTOF10: 0
PAINLEVEL_OUTOF10: 0

## 2024-08-11 NOTE — CARE COORDINATION
Transition of Care: Anticipate discharge to Assisted Living at [facility] ([phone]). Cm contacted patients sister aKt ([phone]) and she informed cm that [place] was going to come to the hospital on Monday to complete assessment.   RUR: 8%  Prior Level of Functioning: Patient lives alone. Patient with history of dementia that is increasing. Patient with increasing inability to live alone   Transportation at discharge: wheelchair  Caregiver Contact:   Primary Decision Maker: RaeannKat - Other - [phone]
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
## 2024-08-12 PROCEDURE — 1100000000 HC RM PRIVATE

## 2024-08-12 PROCEDURE — 6370000000 HC RX 637 (ALT 250 FOR IP): Performed by: STUDENT IN AN ORGANIZED HEALTH CARE EDUCATION/TRAINING PROGRAM

## 2024-08-12 PROCEDURE — 6370000000 HC RX 637 (ALT 250 FOR IP): Performed by: NURSE PRACTITIONER

## 2024-08-12 PROCEDURE — 6370000000 HC RX 637 (ALT 250 FOR IP): Performed by: HOSPITALIST

## 2024-08-12 RX ORDER — HALOPERIDOL 0.5 MG/1
0.5 TABLET ORAL EVERY 6 HOURS PRN
Status: DISCONTINUED | OUTPATIENT
Start: 2024-08-12 | End: 2024-08-16 | Stop reason: HOSPADM

## 2024-08-12 RX ADMIN — LISINOPRIL 10 MG: 20 TABLET ORAL at 10:20

## 2024-08-12 RX ADMIN — DONEPEZIL HYDROCHLORIDE 5 MG: 10 TABLET, FILM COATED ORAL at 20:37

## 2024-08-12 RX ADMIN — POLYETHYLENE GLYCOL 3350 17 G: 17 POWDER, FOR SOLUTION ORAL at 10:21

## 2024-08-12 RX ADMIN — TRAZODONE HYDROCHLORIDE 25 MG: 50 TABLET ORAL at 20:36

## 2024-08-12 RX ADMIN — DULOXETINE HYDROCHLORIDE 30 MG: 30 CAPSULE, DELAYED RELEASE ORAL at 10:20

## 2024-08-12 RX ADMIN — ROSUVASTATIN CALCIUM 10 MG: 10 TABLET, FILM COATED ORAL at 20:37

## 2024-08-12 RX ADMIN — AMLODIPINE BESYLATE 5 MG: 5 TABLET ORAL at 10:20

## 2024-08-12 RX ADMIN — MIRTAZAPINE 15 MG: 15 TABLET, FILM COATED ORAL at 20:37

## 2024-08-12 RX ADMIN — OLANZAPINE 5 MG: 5 TABLET, ORALLY DISINTEGRATING ORAL at 20:36

## 2024-08-12 ASSESSMENT — PAIN SCALES - GENERAL: PAINLEVEL_OUTOF10: 0

## 2024-08-12 NOTE — PROGRESS NOTES
NUTRITION brief    Recommendations:   Continue regular diet   Ensure Plus 2x/day   Continue bowel regimen, intensify prn   Continue to trend weights, obtain an updated standing scaled wt as able   Continue to document % intake of meals in flowsheets      Diet: Regular   Supplements/Nutrition Support: Ensure Plus HP 2x/day  Nutrition-related meds: Remeron, Glycolax    New events impacting nutrition plan of care:   Follow-up. Discussed in rounds, possible discharge today. Pt eating breakfast during RD visit, few bites of pancakes taken however, pt not finished with meal. Unopened Ensure on tray (2 unopened on bedside table). Pt states that he likes Ensure and gets excited when he sees it on his tray; has Ensure at home but never tried it PTA per pt report. Lower than normal intakes documented yesterday (26 - 50% x 2 doc meals), pt is on Remeron. Pt did c/o bloating/gas, s/p gas-x per sitter report. Gycolax ordered, LBM PTA per flowsheets - continue bowel regimen, intensify prn. Wts trending down while IP, note bescale vs standing scale - continue to trend wts, obtain updated standing scaled wt as able. Continue regular diet and ordered ONS.       See full RD assessment from 8/8/24 for additional details, goals, and monitoring/evaluation.   Estimated Nutrition Needs:   Energy Requirements Based On: Kcal/kg  Weight Used for Energy Requirements: Current  Energy (kcal/day): 1748 - 2097 kcal/day (25 - 30 kcal/kg)  Weight Used for Protein Requirements: Current  Protein (g/day): 84 g/day (1.2 g/kg)  Method Used for Fluid Requirements: 1 ml/kcal  Fluid (ml/day): 1748 - 2097 ml/day (1ml/kcal)    Manisha Monge, AILEEN

## 2024-08-12 NOTE — PROGRESS NOTES
Physician Progress Note      PATIENT:               RUY POLLARD  CSN #:                  [id number]  :                       1955  ADMIT DATE:       2024 3:32 PM  DISCH DATE:  RESPONDING  PROVIDER #:        Raymundo Aguayo MD          QUERY TEXT:    Patient admitted with Progressive dementia. Noted to have \"mild muscle mass   loss and weight loss of 7.5% in 3 months\" per RD consult note dated . If   possible, please document in progress notes and discharge summary if you are   evaluating and /or treating any of the following:    The medical record reflects the following:  Risk Factors: Hx: HTN, HLD, Dementia; Gout; Skin CA; Vit. D. Deficiency  Clinical Indicators: RD noted patient meets the following ASPEN criteria:  Malnutrition Assessment:  Malnutrition Status:  Moderate malnutrition (24 [id number])  Context:  Social/Environmental Circumstances  Findings of the 6 clinical characteristics of malnutrition:  Energy Intake:  Mild decrease in energy intake (Comment)  Weight Loss:  Greater than 7.5% over 3 months  Body Fat Loss:  No significant body fat loss  Muscle Mass Loss:  Mild muscle mass loss Temples (temporalis), Clavicles   (pectoralis & deltoids)  Fluid Accumulation:  No significant fluid accumulation   Strength:  Not Performed      Treatment: RD consult; Nutrition Recommendations/Plan: Continue regular diet;   Ensure Plus HP 2x/day - chocolate; Please document % intake of meals in   flowsheets    Thank you,    Suzanne Martin  CDI  [email or web address]    ASPEN Criteria:    https://aspenjournals.onlinelibrary.castano.com/doi/full/10.1177/592182649448560  5  Options provided:  -- Protein calorie malnutrition moderate  -- Other - I will add my own diagnosis  -- Disagree - Not applicable / Not valid  -- Disagree - Clinically unable to determine / Unknown  -- Refer to Clinical Documentation Reviewer    PROVIDER RESPONSE TEXT:    This patient has moderate protein calorie malnutrition.    Query

## 2024-08-12 NOTE — CARE COORDINATION
Transition of Care: Anticipate discharge to Assisted Living at [facility] ([phone]). Cm contacted patients sister Kat ([phone]) and she informed cm that Augustina was going to come to the hospital on Monday to complete assessment.   RUR: 8%  Prior Level of Functioning: Patient lives alone. Patient with history of dementia that is increasing. Patient with increasing inability to live alone   Transportation at discharge: wheelchair  Caregiver Contact:   Primary Decision Maker: RaeannKat boucher - Other - [phone]       JACKSON spoke with Nilda, [phone]. She is on her way to visit with the patient.    7993-  met with Nilda after her assessment with him. She also spoke with attending. Nilda said they can accept this patient into their facility on Friday pending completion of FPC packet and meeting with his sister.   She is going to email cm the paperwork, his sister is aware that he is medically ready for discharge.     AILYN PatiñoSur[facility]  Care Management Department  Ph:[phone]

## 2024-08-12 NOTE — PROGRESS NOTES
[facility]  Hospitalist Group                                                                                          Hospitalist Progress Note  Raymundo Aguayo MD  Office Phone: [phone]        Date of Service:  2024  NAME:  Sid Cary  :  1955  MRN:  [id number]       Admission Summary:   Sid Cary is a 68 y.o. male with pmh of htn, gout, skin cancer who presented to ed with family for concerns of worsening memory and confusion.  Pt is seen and examined at bedside.  He provides limited history.  Patient states he feels fine and is unsure of what concerns his sister and mother staff about his memory.  He states he is intermittently forgetful but it is not abnormal.  States he is still active drives unable to take care of himself.  He continually insist during our conversation that many people have been questioning presented behind his back and this has been creating some distress for him.  He denies any falls or head injury.  No previous history of CVA.  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  He would intermittently confused me for one of his coworkers was calling to and occasionally tell me that we were on campus.  Per chart review, patient's sister has had concerns for worsening memory and intermittent episodes of aggression towards his neighbors.  He has been seen by his PCP and was sent to ED to expedite his workup.  The patient denies any fever, chills, chest or abdominal pain, nausea, vomiting, cough, congestion, recent illness, palpitations, or dysuria.         Interval history / Subjective:     Stable awaiting placement     Discussed with Kat discharge     Assessment & Plan:     Progressive dementia  Dementia with behavioral issues  Metabolic encephalopathy due to above  -CT head negative for acute process.  MRI head negative for acute process.  B12 folate TSH in normal limit.    -Continue Zyprexa  hours.  No results for input(s): \"CPK\" in the last 72 hours.    Invalid input(s): \"CPKMB\", \"CKNDX\", \"TROIQ\"  Lab Results   Component Value Date/Time    CHOL 179 03/14/2024 11:06 AM    HDL 71 03/14/2024 11:06 AM    LDL 88.8 03/14/2024 11:06 AM     No results found for: \"GLUCPOC\"  [unfilled]    Notes reviewed from all clinical/nonclinical/nursing services involved in patient's clinical care. Care coordination discussions were held with appropriate clinical/nonclinical/ nursing providers based on care coordination needs.         Patients current active Medications were reviewed, considered, added and adjusted based on the clinical condition today.      Home Medications were reconciled to the best of my ability given all available resources at the time of admission. Route is PO if not otherwise noted.      Admission Status:[id number]:::1}      Medications Reviewed:     Current Facility-Administered Medications   Medication Dose Route Frequency    haloperidol (HALDOL) tablet 0.5 mg  0.5 mg Oral Q6H PRN    calcium carbonate (TUMS) chewable tablet 500 mg  500 mg Oral TID PRN    DULoxetine (CYMBALTA) extended release capsule 30 mg  30 mg Oral Daily    polyethylene glycol (GLYCOLAX) packet 17 g  17 g Oral Daily    OLANZapine zydis (ZYPREXA) disintegrating tablet 5 mg  5 mg Oral Nightly    mirtazapine (REMERON) tablet 15 mg  15 mg Oral Nightly    traZODone (DESYREL) tablet 25 mg  25 mg Oral Q4H PRN    hydrOXYzine HCl (ATARAX) tablet 25 mg  25 mg Oral TID PRN    loperamide (IMODIUM) capsule 2 mg  2 mg Oral 4x Daily PRN    donepezil (ARICEPT) tablet 5 mg  5 mg Oral Nightly    rosuvastatin (CRESTOR) tablet 10 mg  10 mg Oral Nightly    amLODIPine (NORVASC) tablet 5 mg  5 mg Oral Daily    And    lisinopril (PRINIVIL;ZESTRIL) tablet 10 mg  10 mg Oral Daily     ______________________________________________________________________  EXPECTED LENGTH OF STAY: 5  ACTUAL LENGTH OF STAY:          5                 Raymundo Aguayo MD

## 2024-08-12 NOTE — PLAN OF CARE
Problem: Safety - Adult  Goal: Free from fall injury  8/12/2024 0012 by Amarjit Carter RN  Outcome: Progressing  Flowsheets (Taken 8/11/2024 2100)  Free From Fall Injury: Instruct family/caregiver on patient safety  8/11/2024 1230 by Nehal Quintana RN  Outcome: Progressing     Problem: Discharge Planning  Goal: Discharge to home or other facility with appropriate resources  8/12/2024 0012 by Amarjit Carter RN  Outcome: Progressing  Flowsheets (Taken 8/11/2024 2100)  Discharge to home or other facility with appropriate resources: Identify barriers to discharge with patient and caregiver  8/11/2024 1230 by Nehal Quintana RN  Outcome: Progressing     Problem: Chronic Conditions and Co-morbidities  Goal: Patient's chronic conditions and co-morbidity symptoms are monitored and maintained or improved  8/12/2024 0012 by Amarjit Carter RN  Outcome: Progressing  Flowsheets (Taken 8/11/2024 2100)  Care Plan - Patient's Chronic Conditions and Co-Morbidity Symptoms are Monitored and Maintained or Improved: Monitor and assess patient's chronic conditions and comorbid symptoms for stability, deterioration, or improvement  8/11/2024 1230 by Nehal Quintana RN  Outcome: Progressing     Problem: Nutrition Deficit:  Goal: Optimize nutritional status  8/12/2024 0012 by Amarjit Carter RN  Outcome: Progressing  8/11/2024 1230 by Nehal Quintana RN  Outcome: Progressing     Problem: Safety - Medical Restraint  Goal: Remains free of injury from restraints (Restraint for Interference with Medical Device)  Description: INTERVENTIONS:  1. Determine that other, less restrictive measures have been tried or would not be effective before applying the restraint  2. Evaluate the patient's condition at the time of restraint application  3. Inform patient/family regarding the reason for restraint  4. Q2H: Monitor safety, psychosocial status, comfort, nutrition and hydration  8/12/2024 0012 by Amarjit Carter

## 2024-08-13 ENCOUNTER — APPOINTMENT (OUTPATIENT)
Facility: HOSPITAL | Age: 69
End: 2024-08-13
Payer: MEDICARE

## 2024-08-13 PROCEDURE — 1100000000 HC RM PRIVATE

## 2024-08-13 PROCEDURE — 6370000000 HC RX 637 (ALT 250 FOR IP): Performed by: NURSE PRACTITIONER

## 2024-08-13 PROCEDURE — 6370000000 HC RX 637 (ALT 250 FOR IP): Performed by: STUDENT IN AN ORGANIZED HEALTH CARE EDUCATION/TRAINING PROGRAM

## 2024-08-13 PROCEDURE — 6370000000 HC RX 637 (ALT 250 FOR IP): Performed by: HOSPITALIST

## 2024-08-13 PROCEDURE — 71045 X-RAY EXAM CHEST 1 VIEW: CPT

## 2024-08-13 RX ADMIN — ROSUVASTATIN CALCIUM 10 MG: 10 TABLET, FILM COATED ORAL at 21:54

## 2024-08-13 RX ADMIN — LISINOPRIL 10 MG: 20 TABLET ORAL at 10:05

## 2024-08-13 RX ADMIN — DULOXETINE HYDROCHLORIDE 30 MG: 30 CAPSULE, DELAYED RELEASE ORAL at 10:05

## 2024-08-13 RX ADMIN — TRAZODONE HYDROCHLORIDE 25 MG: 50 TABLET ORAL at 21:53

## 2024-08-13 RX ADMIN — DONEPEZIL HYDROCHLORIDE 5 MG: 10 TABLET, FILM COATED ORAL at 21:54

## 2024-08-13 RX ADMIN — POLYETHYLENE GLYCOL 3350 17 G: 17 POWDER, FOR SOLUTION ORAL at 10:05

## 2024-08-13 RX ADMIN — MIRTAZAPINE 15 MG: 15 TABLET, FILM COATED ORAL at 21:55

## 2024-08-13 RX ADMIN — AMLODIPINE BESYLATE 5 MG: 5 TABLET ORAL at 10:05

## 2024-08-13 RX ADMIN — OLANZAPINE 5 MG: 5 TABLET, ORALLY DISINTEGRATING ORAL at 21:55

## 2024-08-13 ASSESSMENT — PAIN SCALES - GENERAL
PAINLEVEL_OUTOF10: 0
PAINLEVEL_OUTOF10: 0

## 2024-08-13 NOTE — PROGRESS NOTES
[facility]  Hospitalist Group                                                                                          Hospitalist Progress Note  Raymundo Aguayo MD  Office Phone: [phone]        Date of Service:  2024  NAME:  Sid Cary  :  1955  MRN:  [id number]       Admission Summary:   Sid Cary is a 68 y.o. male with pmh of htn, gout, skin cancer who presented to ed with family for concerns of worsening memory and confusion.  Pt is seen and examined at bedside.  He provides limited history.  Patient states he feels fine and is unsure of what concerns his sister and mother staff about his memory.  He states he is intermittently forgetful but it is not abnormal.  States he is still active drives unable to take care of himself.  He continually insist during our conversation that many people have been questioning presented behind his back and this has been creating some distress for him.  He denies any falls or head injury.  No previous history of CVA.  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  He would intermittently confused me for one of his coworkers was calling to and occasionally tell me that we were on campus.  Per chart review, patient's sister has had concerns for worsening memory and intermittent episodes of aggression towards his neighbors.  He has been seen by his PCP and was sent to ED to expedite his workup.  The patient denies any fever, chills, chest or abdominal pain, nausea, vomiting, cough, congestion, recent illness, palpitations, or dysuria.         Interval history / Subjective:     Stable awaiting placement     Paperwork filled along with case management for half-way     Assessment & Plan:     Progressive dementia  Dementia with behavioral issues  Metabolic encephalopathy due to above  -CT head negative for acute process.  MRI head negative for acute process.  B12 folate TSH in normal  \"PH\", \"PCO2\", \"PO2\" in the last 72 hours.  No results for input(s): \"CPK\" in the last 72 hours.    Invalid input(s): \"CPKMB\", \"CKNDX\", \"TROIQ\"  Lab Results   Component Value Date/Time    CHOL 179 03/14/2024 11:06 AM    HDL 71 03/14/2024 11:06 AM    LDL 88.8 03/14/2024 11:06 AM     No results found for: \"GLUCPOC\"  [unfilled]    Notes reviewed from all clinical/nonclinical/nursing services involved in patient's clinical care. Care coordination discussions were held with appropriate clinical/nonclinical/ nursing providers based on care coordination needs.         Patients current active Medications were reviewed, considered, added and adjusted based on the clinical condition today.      Home Medications were reconciled to the best of my ability given all available resources at the time of admission. Route is PO if not otherwise noted.      Admission Status:[id number]:::1}      Medications Reviewed:     Current Facility-Administered Medications   Medication Dose Route Frequency    haloperidol (HALDOL) tablet 0.5 mg  0.5 mg Oral Q6H PRN    calcium carbonate (TUMS) chewable tablet 500 mg  500 mg Oral TID PRN    DULoxetine (CYMBALTA) extended release capsule 30 mg  30 mg Oral Daily    polyethylene glycol (GLYCOLAX) packet 17 g  17 g Oral Daily    OLANZapine zydis (ZYPREXA) disintegrating tablet 5 mg  5 mg Oral Nightly    mirtazapine (REMERON) tablet 15 mg  15 mg Oral Nightly    traZODone (DESYREL) tablet 25 mg  25 mg Oral Q4H PRN    hydrOXYzine HCl (ATARAX) tablet 25 mg  25 mg Oral TID PRN    loperamide (IMODIUM) capsule 2 mg  2 mg Oral 4x Daily PRN    donepezil (ARICEPT) tablet 5 mg  5 mg Oral Nightly    rosuvastatin (CRESTOR) tablet 10 mg  10 mg Oral Nightly    amLODIPine (NORVASC) tablet 5 mg  5 mg Oral Daily    And    lisinopril (PRINIVIL;ZESTRIL) tablet 10 mg  10 mg Oral Daily     ______________________________________________________________________  EXPECTED LENGTH OF STAY: 5  ACTUAL LENGTH OF STAY:

## 2024-08-13 NOTE — CARE COORDINATION
Transition of Care: Anticipate discharge to Assisted Living at [facility] ([phone]). Cm contacted patients sister Kat ([phone]) and she informed cm that Augustina was going to come to the hospital on Monday to complete assessment.   RUR: 8%  Prior Level of Functioning: Patient lives alone. Patient with history of dementia that is increasing. Patient with increasing inability to live alone   Transportation at discharge: wheelchair  Caregiver Contact:   Primary Decision Maker: Kat Cary - Other - [phone]         JACKSON spoke with Nilda, [phone]. She is on her way to visit with the patient.     CM received snf forms and attending completed. [facility] requested covid test and chest XR prior to admission.  MD to dior.       AILYN [facility]  Care Management Department  Ph:[phone]

## 2024-08-13 NOTE — BSMART NOTE
BSMART Liaison Team Note     LOS:  6 Days     Patient goal(s) for today: Take medications as prescribed, communicate needs to staff in an appropriate manner, utilize coping skills as needed  BSMART Liaison team focus/goals: Assess needs, provide education and support    Progress note: Liaison met w/ pt face to face.  Pt was received sitting up in bed upon entering the room w/ 1:1 sitter present.  Pt agreed to meet w/ this liaison and shares that he was just texting his friends who are currently traveling through the [facility].  Pt denies any current feelings of depression or anxiety.  Pt denies SI/HI/VH/AH; no evidence of psychosis or delusional thoughts.  Pt expresses some feelings of frustration d/t a recent fall and the aging process.  Liaison provided validation and supportive listening.  Pt reports significant improvements regarding his sleep and appetite. Pt identified spending time w/ his family and talking w/ friends as his primary coping mechanisms.  Pt reports his sister and cousins have come to visit him since being at the hospital.  Pt presents in causal appearance. Attitude is cooperative. Motor activity is appropriate. Speech is of normal rate, rhythm, volume and tone. Eye-contact is direct and appropriate for the situation. Affect is appropriate. Mood is anxious. Thought process is linear and intact to assessment. Pt is oriented to self, year, location and situation, but not date or month.  Pt denies having any questions or concerns at this time, and thanked liaison for the visit. BSMART liaison team will continue to follow for weekly supportive visits.      Barriers to Discharge: Medical Clearance & LTC Placement    Outpatient provider(s):  None Reported  Insurance info/prescription coverage:  MEDICARE PART A AND B & ANTHEM MEDICARE SUPP    Diagnosis: Dementia per Hx    Plan:  Please defer to the psychiatric consult note upon completion for recommendations and disposition.  BSMART liaison team  will continue to follow for weekly supportive visits.    Follow up Psych Consult placed? No  Psychiatrist updated? No      Participating treatment team members: Sid Cary, Merna Pereyra LMSW

## 2024-08-14 ENCOUNTER — TELEPHONE (OUTPATIENT)
Facility: HOSPITAL | Age: 69
End: 2024-08-14

## 2024-08-14 LAB
FLUAV RNA SPEC QL NAA+PROBE: NOT DETECTED
FLUBV RNA SPEC QL NAA+PROBE: NOT DETECTED
SARS-COV-2 RNA RESP QL NAA+PROBE: NOT DETECTED

## 2024-08-14 PROCEDURE — 87636 SARSCOV2 & INF A&B AMP PRB: CPT

## 2024-08-14 PROCEDURE — 6370000000 HC RX 637 (ALT 250 FOR IP): Performed by: HOSPITALIST

## 2024-08-14 PROCEDURE — 6370000000 HC RX 637 (ALT 250 FOR IP): Performed by: NURSE PRACTITIONER

## 2024-08-14 PROCEDURE — 1100000000 HC RM PRIVATE

## 2024-08-14 PROCEDURE — 6370000000 HC RX 637 (ALT 250 FOR IP): Performed by: STUDENT IN AN ORGANIZED HEALTH CARE EDUCATION/TRAINING PROGRAM

## 2024-08-14 RX ADMIN — MIRTAZAPINE 15 MG: 15 TABLET, FILM COATED ORAL at 22:08

## 2024-08-14 RX ADMIN — DULOXETINE HYDROCHLORIDE 30 MG: 30 CAPSULE, DELAYED RELEASE ORAL at 10:07

## 2024-08-14 RX ADMIN — LISINOPRIL 10 MG: 20 TABLET ORAL at 10:07

## 2024-08-14 RX ADMIN — OLANZAPINE 5 MG: 5 TABLET, ORALLY DISINTEGRATING ORAL at 22:12

## 2024-08-14 RX ADMIN — DONEPEZIL HYDROCHLORIDE 5 MG: 10 TABLET, FILM COATED ORAL at 22:08

## 2024-08-14 RX ADMIN — TRAZODONE HYDROCHLORIDE 25 MG: 50 TABLET ORAL at 22:08

## 2024-08-14 RX ADMIN — AMLODIPINE BESYLATE 5 MG: 5 TABLET ORAL at 10:07

## 2024-08-14 RX ADMIN — ROSUVASTATIN CALCIUM 10 MG: 10 TABLET, FILM COATED ORAL at 22:08

## 2024-08-14 ASSESSMENT — PAIN SCALES - GENERAL: PAINLEVEL_OUTOF10: 0
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
## 2024-08-14 NOTE — TELEPHONE ENCOUNTER
Pt needs a hospital follow up appointment  Provider: Any  Location: [facility]  In person or virtual: In person  When: 4-6 weeks   Diagnosis/reason for follow up: ~ 2 month ago onset of confusion and also agitation noted in hospital; patient cannot live alone or drive anymore; imaging and labs negative-> needs outpatient Neuropsych testing   Additional information:

## 2024-08-14 NOTE — PLAN OF CARE
Problem: Safety - Adult  Goal: Free from fall injury  Outcome: Progressing     Problem: Discharge Planning  Goal: Discharge to home or other facility with appropriate resources  Outcome: Progressing     Problem: Chronic Conditions and Co-morbidities  Goal: Patient's chronic conditions and co-morbidity symptoms are monitored and maintained or improved  Outcome: Progressing     Problem: Nutrition Deficit:  Goal: Optimize nutritional status  Outcome: Progressing     Problem: Pain  Goal: Verbalizes/displays adequate comfort level or baseline comfort level  Outcome: Progressing

## 2024-08-14 NOTE — CARE COORDINATION
Transition of Care: Anticipate discharge to Assisted Living at [facility] ([phone]). Cm contacted patients sister Kat ([phone]) and she informed cm that Augustina was going to come to the hospital on Monday to complete assessment.   RUR: 8%  Prior Level of Functioning: Patient lives alone. Patient with history of dementia that is increasing. Patient with increasing inability to live alone   Transportation at discharge: wheelchair  Caregiver Contact:   Primary Decision Maker: Kat Cary - Other - [phone]  Barriers to discharge: [facility] acceptance on Friday, patient unable to safely return home alone.      Cm faxed [facility] papers to facility Attn: Zaire  [phone]. Fax number: [phone].     [facility]  Care Management Department  Ph:[phone]

## 2024-08-15 PROCEDURE — 6370000000 HC RX 637 (ALT 250 FOR IP): Performed by: NURSE PRACTITIONER

## 2024-08-15 PROCEDURE — 6370000000 HC RX 637 (ALT 250 FOR IP): Performed by: HOSPITALIST

## 2024-08-15 PROCEDURE — 6370000000 HC RX 637 (ALT 250 FOR IP): Performed by: STUDENT IN AN ORGANIZED HEALTH CARE EDUCATION/TRAINING PROGRAM

## 2024-08-15 PROCEDURE — 1100000000 HC RM PRIVATE

## 2024-08-15 RX ORDER — METOPROLOL SUCCINATE 25 MG/1
50 TABLET, EXTENDED RELEASE ORAL DAILY
Status: DISCONTINUED | OUTPATIENT
Start: 2024-08-15 | End: 2024-08-16 | Stop reason: HOSPADM

## 2024-08-15 RX ADMIN — AMLODIPINE BESYLATE 5 MG: 5 TABLET ORAL at 10:03

## 2024-08-15 RX ADMIN — METOPROLOL SUCCINATE 50 MG: 25 TABLET, EXTENDED RELEASE ORAL at 14:56

## 2024-08-15 RX ADMIN — TRAZODONE HYDROCHLORIDE 25 MG: 50 TABLET ORAL at 21:13

## 2024-08-15 RX ADMIN — DULOXETINE HYDROCHLORIDE 30 MG: 30 CAPSULE, DELAYED RELEASE ORAL at 10:03

## 2024-08-15 RX ADMIN — DONEPEZIL HYDROCHLORIDE 5 MG: 10 TABLET, FILM COATED ORAL at 21:13

## 2024-08-15 RX ADMIN — MIRTAZAPINE 15 MG: 15 TABLET, FILM COATED ORAL at 21:13

## 2024-08-15 RX ADMIN — POLYETHYLENE GLYCOL 3350 17 G: 17 POWDER, FOR SOLUTION ORAL at 10:00

## 2024-08-15 RX ADMIN — LISINOPRIL 10 MG: 20 TABLET ORAL at 10:03

## 2024-08-15 RX ADMIN — ROSUVASTATIN CALCIUM 10 MG: 10 TABLET, FILM COATED ORAL at 21:13

## 2024-08-15 RX ADMIN — OLANZAPINE 5 MG: 5 TABLET, ORALLY DISINTEGRATING ORAL at 21:13

## 2024-08-15 NOTE — PROGRESS NOTES
Comprehensive Nutrition Assessment    Type and Reason for Visit: Reassess    Nutrition Recommendations/Plan:   Continue regular diet   Ensure Plus HP 2x/day   Please document % intake of meals in flowsheets   Please obtain an updated scaled wt as able        Malnutrition Assessment:  Malnutrition Status:  Moderate malnutrition (08/08/24 1336)    Context:  Social/Environmental Circumstances     Findings of the 6 clinical characteristics of malnutrition:  Energy Intake:  Mild decrease in energy intake (Comment)  Weight Loss:  Greater than 7.5% over 3 months     Body Fat Loss:  No significant body fat loss     Muscle Mass Loss:  Mild muscle mass loss Temples (temporalis), Clavicles (pectoralis & deltoids)  Fluid Accumulation:  No significant fluid accumulation     Strength:  Not Performed       Nutrition Assessment:    PMH of HTN, gout, skin cancer, NKA.     Presents w/ worsening memory and confusion, likely progressive dementia per H&P. Head CT unremarkable, pending MRI.     (8/15) Follow-up. Psychiatry following, cleared pt for discharge. Discharge pending bed placement for skilled facility. Sitter at bedside. Pt reports a good appetite/intake, eating \"more than he ever has\". RD confirmed with sitter, pt consumed 100% of his breakfast tray this morning. Pt states that he has been drinking all of ordered ONS and requesting more however, 3 unopened bottles on bedside table. ~9# wt loss during admission per EMR however, standing scale likely more accurate, will continue to use admission wt for estimated nutritional needs. Will continue to send ONS 2x/day. No reported nausea/vomiting. Continue current diet.     (8/12) Refer to nutrition brief note    (8/8) Chart reviewed for MST 2. Consider accuracy of pt reported information given dementia. Pt reports eating some breakfast this AM (confirmed by javon, raul and toast) however, intakes usually much greater PTA. Meal preferences obtained/recorded. ~11% wt loss x 5

## 2024-08-15 NOTE — CONSULTS
[facility]  PSYCHIATRY CONSULT NOTE:    Name: Sid Cary  MR#: [id number]  : 1955  ACCOUNT#: [id number]  ADMIT DATE: 2024    REASON FOR CONSULT: AMS    PATIENT SEEN VIRTUALLY WITH [facility] liaison Mari Fitzpatrick coordinating visit and present at bedside      Interval Update:  24  Patient seen today with one-to-one sitter at bedside while lying in bed resting comfortably.  He appeared to be in good spirits and even cracked a joke with me.  He denies any bothersome symptoms or complaints.  Per his RN he does get agitated somewhat at night.  She reports that he has not needed medication to her knowledge.  He denies to me any complaints or concerns today.  He has been compliant with medications and other day-to-day ADL requests..  Continuing to await for bed placement in skilled facility.    HISTORY OF PRESENTING COMPLAINT:  Sid Cary is a 68 y.o. male admitted to [facility] for medical care and review of worsening altered mental status more acute over the last 2 months with a change in personality and aggression.  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 He did have a significant TBI in 2021 when he fell down a flight of stairs.    Recently, his family and friends have noticed his memory progressively worsening but a more rapid pace.  He has had an MRI this month 2024 showing chronic microvascular ischemic changes and mild to moderate temporal predominant cerebral atrophy.  Additional workup for other possible etiology pertaining to more acute change in memory all within normal limits (Test done include: UDA/UDS, WBC, B12/folate, ESR, Lyme panel, KITA done).  From reviewing notes and talking with Fritz Cary, he has not been complaining or recently treated for any acute medical ailment like UTI, URI, or other physical ailments and basic electrolyte and chemistry and more specialized labs for possible  psychiatry as needed.

## 2024-08-15 NOTE — PLAN OF CARE
Problem: Safety - Adult  Goal: Free from fall injury  8/15/2024 1120 by Cintia Rodriguez RN  Outcome: Progressing  8/15/2024 0224 by Selma Virgen LPN  Outcome: Progressing     Problem: Discharge Planning  Goal: Discharge to home or other facility with appropriate resources  8/15/2024 1120 by Cintia Rodriguez RN  Outcome: Progressing  8/15/2024 0224 by Selma Virgen LPN  Outcome: Progressing     Problem: Chronic Conditions and Co-morbidities  Goal: Patient's chronic conditions and co-morbidity symptoms are monitored and maintained or improved  8/15/2024 1120 by Cintia Rodriguez RN  Outcome: Progressing  8/15/2024 0224 by Selma Virgen LPN  Outcome: Progressing     Problem: Nutrition Deficit:  Goal: Optimize nutritional status  8/15/2024 1120 by Cintia Rodriguez RN  Outcome: Progressing  8/15/2024 0224 by Selma Virgen LPN  Outcome: Progressing     Problem: Safety - Medical Restraint  Goal: Remains free of injury from restraints (Restraint for Interference with Medical Device)  Description: INTERVENTIONS:  1. Determine that other, less restrictive measures have been tried or would not be effective before applying the restraint  2. Evaluate the patient's condition at the time of restraint application  3. Inform patient/family regarding the reason for restraint  4. Q2H: Monitor safety, psychosocial status, comfort, nutrition and hydration  8/15/2024 1120 by Cintia Rodriguez RN  Outcome: Progressing  8/15/2024 0224 by Selma Virgen LPN  Outcome: Progressing     Problem: Pain  Goal: Verbalizes/displays adequate comfort level or baseline comfort level  8/15/2024 1120 by Cintia Rodriguez RN  Outcome: Progressing  8/15/2024 0224 by Selma Virgen LPN  Outcome: Progressing

## 2024-08-15 NOTE — CARE COORDINATION
7228-CM contacted [facility] of [facility] [phone] and confirmed that they received pt's required completed forms yesterday, 8/14. Additionally, [facility] of pt's [facility] advised that they need to receive pt upon d/c by the latest 2pm tomorrow, 8/16 and CM will need to fax d/c summary and MAR in advance of d/c to residential at: [phone]. CM to follow.  Rosalina Lane RN BSN CCM

## 2024-08-15 NOTE — PROGRESS NOTES
[facility] Adult  Hospitalist Group                                                                                          Hospitalist Progress Note  Elva Hedrick MD  Office Phone: [phone]        Date of Service:  8/15/2024  NAME:  Sid Cary  :  1955  MRN:  [id number]       Admission Summary:   Sid Cary is a 68 y.o. male with pmh of htn, gout, skin cancer who presented to ed with family for concerns of worsening memory and confusion.  Pt is seen and examined at bedside.  He provides limited history.  Patient states he feels fine and is unsure of what concerns his sister and mother staff about his memory.  He states he is intermittently forgetful but it is not abnormal.  States he is still active drives unable to take care of himself.  He continually insist during our conversation that many people have been questioning presented behind his back and this has been creating some distress for him.  He denies any falls or head injury.  No previous history of CVA.  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  He would intermittently confused me for one of his coworkers was calling to and occasionally tell me that we were on campus.  Per chart review, patient's sister has had concerns for worsening memory and intermittent episodes of aggression towards his neighbors.  He has been seen by his PCP and was sent to ED to expedite his workup.  The patient denies any fever, chills, chest or abdominal pain, nausea, vomiting, cough, congestion, recent illness, palpitations, or dysuria.         Interval history / Subjective:     Stable awaiting placement     Paperwork filled along with case management for [facility]  Pt said the [facility] is too far to his previous home, but is close to his sister      Assessment & Plan:     Progressive dementia  Dementia with behavioral issues  Metabolic encephalopathy due to above  -CT head negative for acute process.  MRI

## 2024-08-16 VITALS
OXYGEN SATURATION: 97 % | WEIGHT: 145 LBS | HEART RATE: 68 BPM | BODY MASS INDEX: 20.76 KG/M2 | DIASTOLIC BLOOD PRESSURE: 72 MMHG | SYSTOLIC BLOOD PRESSURE: 116 MMHG | HEIGHT: 70 IN | TEMPERATURE: 97.5 F | RESPIRATION RATE: 18 BRPM

## 2024-08-16 PROCEDURE — 6370000000 HC RX 637 (ALT 250 FOR IP): Performed by: NURSE PRACTITIONER

## 2024-08-16 PROCEDURE — 6370000000 HC RX 637 (ALT 250 FOR IP): Performed by: STUDENT IN AN ORGANIZED HEALTH CARE EDUCATION/TRAINING PROGRAM

## 2024-08-16 PROCEDURE — 6370000000 HC RX 637 (ALT 250 FOR IP): Performed by: HOSPITALIST

## 2024-08-16 RX ORDER — DULOXETIN HYDROCHLORIDE 30 MG/1
30 CAPSULE, DELAYED RELEASE ORAL DAILY
Qty: 30 CAPSULE | Refills: 3 | Status: SHIPPED | OUTPATIENT
Start: 2024-08-16

## 2024-08-16 RX ORDER — OLANZAPINE 5 MG/1
5 TABLET, ORALLY DISINTEGRATING ORAL NIGHTLY
Qty: 30 TABLET | Refills: 0 | Status: SHIPPED | OUTPATIENT
Start: 2024-08-16

## 2024-08-16 RX ORDER — METOPROLOL SUCCINATE 50 MG/1
50 TABLET, EXTENDED RELEASE ORAL DAILY
Qty: 30 TABLET | Refills: 0 | Status: SHIPPED | OUTPATIENT
Start: 2024-08-16

## 2024-08-16 RX ORDER — MIRTAZAPINE 15 MG/1
15 TABLET, FILM COATED ORAL NIGHTLY
Qty: 30 TABLET | Refills: 0 | Status: SHIPPED | OUTPATIENT
Start: 2024-08-16

## 2024-08-16 RX ORDER — POLYETHYLENE GLYCOL 3350 17 G/17G
17 POWDER, FOR SOLUTION ORAL DAILY
Status: SHIPPED | COMMUNITY
Start: 2024-08-16 | End: 2024-09-15

## 2024-08-16 RX ORDER — HYDROXYZINE HYDROCHLORIDE 25 MG/1
25 TABLET, FILM COATED ORAL 3 TIMES DAILY PRN
Qty: 90 TABLET | Refills: 0 | Status: SHIPPED | OUTPATIENT
Start: 2024-08-16 | End: 2024-09-15

## 2024-08-16 RX ADMIN — AMLODIPINE BESYLATE 5 MG: 5 TABLET ORAL at 11:02

## 2024-08-16 RX ADMIN — LISINOPRIL 10 MG: 20 TABLET ORAL at 11:02

## 2024-08-16 RX ADMIN — POLYETHYLENE GLYCOL 3350 17 G: 17 POWDER, FOR SOLUTION ORAL at 11:03

## 2024-08-16 RX ADMIN — METOPROLOL SUCCINATE 50 MG: 25 TABLET, EXTENDED RELEASE ORAL at 11:02

## 2024-08-16 RX ADMIN — DULOXETINE HYDROCHLORIDE 30 MG: 30 CAPSULE, DELAYED RELEASE ORAL at 11:02

## 2024-08-16 ASSESSMENT — PAIN SCALES - GENERAL: PAINLEVEL_OUTOF10: 0

## 2024-08-16 NOTE — CARE COORDINATION
JANAY:    JACKSON spoke with patient sister, Kat Cary [phone]. She and her 2 cousins are coming to pickup the patient within the hour and take him over to [facility].  CM faxed d/c summary and 3 day MAR to [facility] at the [facility] to [phone].     Patient verbalized understanding and gave permission for possible discharge within 4 hours of receiving IM.    RN call report to [facility] at [phone] ask for Yue.     AILYN [facility]  Care Management Department  Ph:[phone]

## 2024-08-16 NOTE — CONSULTS
[facility]  PSYCHIATRY CONSULT NOTE:    Name: Sid Cary  MR#: [id number]  : 1955  ACCOUNT#: [id number]  ADMIT DATE: 2024    REASON FOR CONSULT: AMS    PATIENT SEEN VIRTUALLY WITH [facility]- [facility] liaison Mari Fitzpatrick coordinating visit and present at bedside      Interval Update:    8/15/24  Mr Cary resting comfortably in bed today when I entered the room.  He was talking on the phone with a friend and sounded as though he was enjoying some banter with someone and having a nice chat.  He denied later any concerns today for symptoms of concern to include sleep, anxiousness, confusion, depression or suicide, hallucinations of any type, or eating or any other concern today.  Still awaiting for placement.  One-to-one sitter and room at his side.    24  Patient seen today with one-to-one sitter at bedside while lying in bed resting comfortably.  He appeared to be in good spirits and even cracked a joke with me.  He denies any bothersome symptoms or complaints.  Per his RN he does get agitated somewhat at night.  She reports that he has not needed medication to her knowledge.  He denies to me any complaints or concerns today.  He has been compliant with medications and other day-to-day ADL requests..  Continuing to await for bed placement in skilled facility.    HISTORY OF PRESENTING COMPLAINT:  Sid Cary is a 68 y.o. male admitted to [facility] for medical care and review of worsening altered mental status more acute over the last 2 months with a change in personality and aggression.  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 He did have a significant TBI in 2021 when he fell down a flight of stairs.    Recently, his family and friends have noticed his memory progressively worsening but a more rapid pace.  He has had an MRI this month 2024 showing chronic microvascular ischemic changes and mild to moderate      Full PT and OT work up to make sure no gait disturbances or balance or abnormal movements     Remeron 15 mg for sleep    May use Hydroxyzine 25 for anxiety first line    May use Trazodone 25 mg for agitation first line (or worsening anxiety)    Start Cymbalta 30 mg which has demonstrated to help with agitation in some patients with dementia      Psychiatry will follow daily to assist in psychiatric medication management recommendations       Thank you for the opportunity to participate in the care of your patient. Please re-consult psychiatry as needed.

## 2024-08-16 NOTE — DISCHARGE SUMMARY
Discharge Summary       PATIENT ID: Sid Cary  MRN: [id number]   YOB: 1955    DATE OF ADMISSION: 8/7/2024  3:32 PM    DATE OF DISCHARGE: 8/16/2024   PRIMARY CARE PROVIDER: Jason Hodge MD     ATTENDING PHYSICIAN: ELVA SAAB MD   DISCHARGING PROVIDER: Elva Saab MD    To contact this individual call [phone] and ask the  to page.  If unavailable ask to be transferred the Adult Hospitalist Department.    CONSULTATIONS: IP CONSULT TO PSYCHIATRY  IP CONSULT TO NEUROLOGY    PROCEDURES/SURGERIES: * No surgery found *    ADMITTING DIAGNOSES & HOSPITAL COURSE:   Sid Cary is a 68 y.o. male with pmh of htn, gout, skin cancer who presented to ed with family for concerns of worsening memory and confusion.  Pt is seen and examined at bedside.  He provides limited history.  Patient states he feels fine and is unsure of what concerns his sister and mother staff about his memory.  He states he is intermittently forgetful but it is not abnormal.  States he is still active drives unable to take care of himself.  He continually insist during our conversation that many people have been questioning presented behind his back and this has been creating some distress for him.  He denies any falls or head injury.  No previous history of CVA.  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  He would intermittently confused me for one of his coworkers was calling to and occasionally tell me that we were on campus.  Per chart review, patient's sister has had concerns for worsening memory and intermittent episodes of aggression towards his neighbors.  He has been seen by his PCP and was sent to ED to expedite his workup.  The patient denies any fever, chills, chest or abdominal pain, nausea, vomiting, cough, congestion, recent illness, palpitations, or dysuria.      BRAIN MRI W/WC CONTRAST 8/8  FINDINGS:  There is no intracranial mass, hemorrhage or acute

## 2024-08-16 NOTE — DISCHARGE SUMMARY
1225: QUE Booker called Yue at [facility] and gave her report on this pt coming to her.  1250: RN took out pt's L arm IV.  1255: volunteer taking pt down in wheelchair. Pt stable, A&O x4, and on room air when leaving.

## 2024-08-16 NOTE — DISCHARGE INSTRUCTIONS
Discharge Instructions       PATIENT ID: Sid Cary  MRN: [id number]   YOB: 1955    DATE OF ADMISSION: [unfilled]    DATE OF DISCHARGE: 8/16/2024    PRIMARY CARE PROVIDER: @PCP@     ATTENDING PHYSICIAN: [unfilled]  DISCHARGING PROVIDER: Elva Hedrick MD    To contact this individual call [phone] and ask the  to page.   If unavailable ask to be transferred the Adult Hospitalist Department.    DISCHARGE DIAGNOSES dementia, acute encephalopathy     CONSULTATIONS: [unfilled]    PROCEDURES/SURGERIES: * No surgery found *    PENDING TEST RESULTS:   At the time of discharge the following test results are still pending: none     FOLLOW UP APPOINTMENTS:   [unfilled]     ADDITIONAL CARE RECOMMENDATIONS:   Fall precaution   Follow up PCP in 1-2 weeks to monitor blood pressure     DIET: regular diet      ACTIVITY: activity as tolerated      Radiology      DISCHARGE MEDICATIONS:   See Medication Reconciliation Form    It is important that you take the medication exactly as they are prescribed.   Keep your medication in the bottles provided by the pharmacist and keep a list of the medication names, dosages, and times to be taken in your wallet.   Do not take other medications without consulting your doctor.       NOTIFY YOUR PHYSICIAN FOR ANY OF THE FOLLOWING:   Fever over 101 degrees for 24 hours.   Chest pain, shortness of breath, fever, chills, nausea, vomiting, diarrhea, change in mentation, falling, weakness, bleeding. Severe pain or pain not relieved by medications.  Or, any other signs or symptoms that you may have questions about.      DISPOSITION:    Home With:   OT  PT  HH  RN       SNF/Inpatient Rehab/LTAC    Independent/assisted living    Hospice   x Other:     CDMP Checked:   Yes x     PROBLEM LIST Updated:  Yes x       Signed:   Elva Hedrick MD  8/16/2024  8:18 AM

## 2024-08-16 NOTE — PLAN OF CARE
Problem: Safety - Adult  Goal: Free from fall injury  8/16/2024 1245 by Rebecca Yee RN  Outcome: Progressing  8/16/2024 0137 by Jennifer Oliver RN  Outcome: Progressing     Problem: Discharge Planning  Goal: Discharge to home or other facility with appropriate resources  Outcome: Progressing     Problem: Chronic Conditions and Co-morbidities  Goal: Patient's chronic conditions and co-morbidity symptoms are monitored and maintained or improved  8/16/2024 1245 by Rebecca Yee RN  Outcome: Progressing  8/16/2024 0137 by Jennifer Oliver RN  Outcome: Progressing     Problem: Nutrition Deficit:  Goal: Optimize nutritional status  8/16/2024 1245 by Rebecca Yee RN  Outcome: Progressing  8/16/2024 0137 by Jennifer Oliver RN  Outcome: Progressing     Problem: Safety - Medical Restraint  Goal: Remains free of injury from restraints (Restraint for Interference with Medical Device)  Description: INTERVENTIONS:  1. Determine that other, less restrictive measures have been tried or would not be effective before applying the restraint  2. Evaluate the patient's condition at the time of restraint application  3. Inform patient/family regarding the reason for restraint  4. Q2H: Monitor safety, psychosocial status, comfort, nutrition and hydration  Outcome: Progressing     Problem: Pain  Goal: Verbalizes/displays adequate comfort level or baseline comfort level  8/16/2024 1245 by Rebecca Yee RN  Outcome: Progressing  8/16/2024 0137 by Jennifer Oliver RN  Outcome: Progressing

## 2024-08-19 NOTE — TELEPHONE ENCOUNTER
Called patients main number on file, it belongs to his wife.   She states that patient is currently living in an assisted living facility, I did offer VV apt if it would be easier, but patients wife states that they would rather do an in person.    Patients wife states she will bring him in for his apt along with another family friends/family member, but first she would have to work out the schedule on her end. Patient wife has our address and number and will give us a call back to [facility] patients hosp f/u.

## 2024-08-20 ENCOUNTER — TELEPHONE (OUTPATIENT)
Age: 69
End: 2024-08-20

## 2024-08-20 NOTE — TELEPHONE ENCOUNTER
Patient's sister requesting neuropsych evaluation. Kirstie Michele saw him in ER.    [facility]  [phone]

## 2024-09-30 ENCOUNTER — TELEPHONE (OUTPATIENT)
Age: 69
End: 2024-09-30

## 2024-09-30 NOTE — TELEPHONE ENCOUNTER
Patient called stated he want the provider about power over .    Requesting a call back    Best call back # [phone]

## 2024-10-05 DIAGNOSIS — F03.B0 MODERATE DEMENTIA WITHOUT BEHAVIORAL DISTURBANCE, PSYCHOTIC DISTURBANCE, MOOD DISTURBANCE, OR ANXIETY, UNSPECIFIED DEMENTIA TYPE (HCC): ICD-10-CM

## 2024-10-05 DIAGNOSIS — I10 ESSENTIAL (PRIMARY) HYPERTENSION: ICD-10-CM

## 2024-10-07 ENCOUNTER — TELEPHONE (OUTPATIENT)
Age: 69
End: 2024-10-07

## 2024-10-07 RX ORDER — BISOPROLOL FUMARATE AND HYDROCHLOROTHIAZIDE 5; 6.25 MG/1; MG/1
1 TABLET ORAL DAILY
Qty: 90 TABLET | Refills: 1 | Status: SHIPPED | OUTPATIENT
Start: 2024-10-07

## 2024-10-07 RX ORDER — DONEPEZIL HYDROCHLORIDE 10 MG/1
10 TABLET, FILM COATED ORAL NIGHTLY
Qty: 90 TABLET | Refills: 1 | Status: SHIPPED | OUTPATIENT
Start: 2024-10-07 | End: 2025-04-05

## 2024-10-07 RX ORDER — AMLODIPINE AND BENAZEPRIL HYDROCHLORIDE 5; 10 MG/1; MG/1
CAPSULE ORAL DAILY
Qty: 90 CAPSULE | Refills: 1 | Status: SHIPPED | OUTPATIENT
Start: 2024-10-07

## 2024-10-07 NOTE — TELEPHONE ENCOUNTER
----- Message from Princess LEVINE sent at 10/4/2024  1:55 PM EDT -----  Regarding: [facility] Appointment Request  [facility] Appointment Request    Patient needs appointment for [facility] Appointment Type: Existing Condition Follow Up.    Patient Requested Dates(s): As early as possible   Patient Requested Time: No specific time   Provider Name: Jason Vasquez MD    Reason for Appointment Request: Established Patient - No appointments available during search.  Patient would like to have a conversation with his PCP asking of how he can get out from rehab center sooner.   --------------------------------------------------------------------------------------------------------------------------    Relationship to Patient: Self     Call Back Information: OK to leave message on voicemail  Preferred Call Back Number: Phone [phone]

## 2024-10-07 NOTE — TELEPHONE ENCOUNTER
Pt call back requesting to speak with nurse do to the fact pt has question and would like to speak to nurse.     [facility] [phone]

## 2025-01-03 ENCOUNTER — TELEPHONE (OUTPATIENT)
Age: 70
End: 2025-01-03

## 2025-01-03 NOTE — TELEPHONE ENCOUNTER
Tried to contact pt phone line busy. If pt calls back he would like to schedule an appt to establish care with a new pcp. Pt use to see Dr. Hodge.-TM 1/3/25.

## 2025-01-03 NOTE — TELEPHONE ENCOUNTER
----- Message from YADY CONTRERAS MA sent at 1/3/2025  8:27 AM EST -----  Regarding: FW: [facility] Appointment Request    ----- Message -----  From: Cecily Yanez  Sent: 1/2/2025  12:13 PM EST  To: [facility]  Subject: [facility] Appointment Request                          [facility] Appointment Request    Patient needs appointment for [facility] Appointment Type: New to Provider.    Patient Requested Dates(s): Any day Next week  Patient Requested Time: Any time   Provider Name: Any Provider    Reason for Appointment Request: New Patient - No appointments available during search  --------------------------------------------------------------------------------------------------------------------------    Relationship to Patient: Self     Call Back Information: OK to leave message on voicemail  Preferred Call Back Number: Phone [phone]